# Patient Record
Sex: MALE | Race: WHITE | NOT HISPANIC OR LATINO | Employment: OTHER | URBAN - METROPOLITAN AREA
[De-identification: names, ages, dates, MRNs, and addresses within clinical notes are randomized per-mention and may not be internally consistent; named-entity substitution may affect disease eponyms.]

---

## 2018-05-23 PROBLEM — I63.511 ACUTE ISCHEMIC RIGHT MCA STROKE: Status: ACTIVE | Noted: 2018-05-23

## 2018-05-23 PROBLEM — I63.512 ACUTE ISCHEMIC LEFT MIDDLE CEREBRAL ARTERY (MCA) STROKE: Status: ACTIVE | Noted: 2018-05-23

## 2020-12-01 ENCOUNTER — APPOINTMENT (OUTPATIENT)
Dept: LAB | Facility: HOSPITAL | Age: 65
End: 2020-12-01
Attending: INTERNAL MEDICINE
Payer: MEDICARE

## 2020-12-01 ENCOUNTER — LAB VISIT (OUTPATIENT)
Dept: FAMILY MEDICINE | Facility: CLINIC | Age: 65
End: 2020-12-01
Payer: MEDICARE

## 2020-12-01 DIAGNOSIS — I25.10 CVD (CARDIOVASCULAR DISEASE): ICD-10-CM

## 2020-12-01 DIAGNOSIS — I10 ESSENTIAL HYPERTENSION, MALIGNANT: ICD-10-CM

## 2020-12-01 DIAGNOSIS — D51.9 VITAMIN B12 DEFICIENCY ANEMIA: ICD-10-CM

## 2020-12-01 DIAGNOSIS — I50.9 HEART FAILURE, UNSPECIFIED: ICD-10-CM

## 2020-12-01 DIAGNOSIS — E55.9 VITAMIN D DEFICIENCY DISEASE: ICD-10-CM

## 2020-12-01 DIAGNOSIS — E11.9 DIABETES MELLITUS WITHOUT COMPLICATION: ICD-10-CM

## 2020-12-01 DIAGNOSIS — Z12.5 SCREENING FOR PROSTATE CANCER: ICD-10-CM

## 2020-12-01 DIAGNOSIS — E78.00 PURE HYPERCHOLESTEROLEMIA: Primary | ICD-10-CM

## 2020-12-01 LAB
ALBUMIN SERPL BCP-MCNC: 3.9 G/DL (ref 3.5–5.2)
ALP SERPL-CCNC: 69 U/L (ref 55–135)
ALT SERPL W/O P-5'-P-CCNC: 105 U/L (ref 10–44)
ANION GAP SERPL CALC-SCNC: 9 MMOL/L (ref 8–16)
AST SERPL-CCNC: 81 U/L (ref 10–40)
BASOPHILS # BLD AUTO: 0.07 K/UL (ref 0–0.2)
BASOPHILS NFR BLD: 0.8 % (ref 0–1.9)
BILIRUB SERPL-MCNC: 0.6 MG/DL (ref 0.1–1)
BUN SERPL-MCNC: 11 MG/DL (ref 8–23)
CALCIUM SERPL-MCNC: 8.9 MG/DL (ref 8.7–10.5)
CHLORIDE SERPL-SCNC: 103 MMOL/L (ref 95–110)
CHOLEST SERPL-MCNC: 219 MG/DL (ref 120–199)
CHOLEST/HDLC SERPL: 4.4 {RATIO} (ref 2–5)
CO2 SERPL-SCNC: 25 MMOL/L (ref 23–29)
COMPLEXED PSA SERPL-MCNC: 0.49 NG/ML (ref 0–4)
CREAT SERPL-MCNC: 0.9 MG/DL (ref 0.5–1.4)
DIFFERENTIAL METHOD: ABNORMAL
EOSINOPHIL # BLD AUTO: 0.2 K/UL (ref 0–0.5)
EOSINOPHIL NFR BLD: 2 % (ref 0–8)
ERYTHROCYTE [DISTWIDTH] IN BLOOD BY AUTOMATED COUNT: 12.7 % (ref 11.5–14.5)
EST. GFR  (AFRICAN AMERICAN): >60 ML/MIN/1.73 M^2
EST. GFR  (NON AFRICAN AMERICAN): >60 ML/MIN/1.73 M^2
GLUCOSE SERPL-MCNC: 99 MG/DL (ref 70–110)
HCT VFR BLD AUTO: 49.7 % (ref 40–54)
HDLC SERPL-MCNC: 50 MG/DL (ref 40–75)
HDLC SERPL: 22.8 % (ref 20–50)
HGB BLD-MCNC: 16.6 G/DL (ref 14–18)
IMM GRANULOCYTES # BLD AUTO: 0.03 K/UL (ref 0–0.04)
IMM GRANULOCYTES NFR BLD AUTO: 0.4 % (ref 0–0.5)
LDLC SERPL CALC-MCNC: 148.8 MG/DL (ref 63–159)
LYMPHOCYTES # BLD AUTO: 2.4 K/UL (ref 1–4.8)
LYMPHOCYTES NFR BLD: 28.8 % (ref 18–48)
MAGNESIUM SERPL-MCNC: 2.1 MG/DL (ref 1.6–2.6)
MCH RBC QN AUTO: 32.6 PG (ref 27–31)
MCHC RBC AUTO-ENTMCNC: 33.4 G/DL (ref 32–36)
MCV RBC AUTO: 98 FL (ref 82–98)
MONOCYTES # BLD AUTO: 1 K/UL (ref 0.3–1)
MONOCYTES NFR BLD: 12.1 % (ref 4–15)
NEUTROPHILS # BLD AUTO: 4.7 K/UL (ref 1.8–7.7)
NEUTROPHILS NFR BLD: 55.9 % (ref 38–73)
NONHDLC SERPL-MCNC: 169 MG/DL
NRBC BLD-RTO: 0 /100 WBC
PLATELET # BLD AUTO: 186 K/UL (ref 150–350)
PMV BLD AUTO: 11.7 FL (ref 9.2–12.9)
POTASSIUM SERPL-SCNC: 4.2 MMOL/L (ref 3.5–5.1)
PROT SERPL-MCNC: 8.3 G/DL (ref 6–8.4)
RBC # BLD AUTO: 5.09 M/UL (ref 4.6–6.2)
SODIUM SERPL-SCNC: 137 MMOL/L (ref 136–145)
T4 FREE SERPL-MCNC: 0.71 NG/DL (ref 0.71–1.51)
TRIGL SERPL-MCNC: 101 MG/DL (ref 30–150)
TSH SERPL DL<=0.005 MIU/L-ACNC: 12.97 UIU/ML (ref 0.4–4)
VIT B12 SERPL-MCNC: 362 PG/ML (ref 210–950)
WBC # BLD AUTO: 8.45 K/UL (ref 3.9–12.7)

## 2020-12-01 PROCEDURE — 83735 ASSAY OF MAGNESIUM: CPT

## 2020-12-01 PROCEDURE — 84153 ASSAY OF PSA TOTAL: CPT

## 2020-12-01 PROCEDURE — 84439 ASSAY OF FREE THYROXINE: CPT

## 2020-12-01 PROCEDURE — 82306 VITAMIN D 25 HYDROXY: CPT

## 2020-12-01 PROCEDURE — 80053 COMPREHEN METABOLIC PANEL: CPT

## 2020-12-01 PROCEDURE — 82607 VITAMIN B-12: CPT

## 2020-12-01 PROCEDURE — 84443 ASSAY THYROID STIM HORMONE: CPT

## 2020-12-01 PROCEDURE — 80061 LIPID PANEL: CPT

## 2020-12-01 PROCEDURE — 83036 HEMOGLOBIN GLYCOSYLATED A1C: CPT

## 2020-12-01 PROCEDURE — 85025 COMPLETE CBC W/AUTO DIFF WBC: CPT

## 2020-12-02 LAB
25(OH)D3+25(OH)D2 SERPL-MCNC: 22 NG/ML (ref 30–96)
ESTIMATED AVG GLUCOSE: 108 MG/DL (ref 68–131)
HBA1C MFR BLD HPLC: 5.4 % (ref 4–5.6)

## 2020-12-09 ENCOUNTER — OFFICE VISIT (OUTPATIENT)
Dept: FAMILY MEDICINE | Facility: CLINIC | Age: 65
End: 2020-12-09
Payer: MEDICARE

## 2020-12-09 VITALS
BODY MASS INDEX: 35.21 KG/M2 | OXYGEN SATURATION: 98 % | WEIGHT: 260 LBS | HEIGHT: 72 IN | TEMPERATURE: 98 F | HEART RATE: 64 BPM | SYSTOLIC BLOOD PRESSURE: 138 MMHG | DIASTOLIC BLOOD PRESSURE: 80 MMHG | RESPIRATION RATE: 14 BRPM

## 2020-12-09 DIAGNOSIS — I25.10 CVD (CARDIOVASCULAR DISEASE): Primary | ICD-10-CM

## 2020-12-09 DIAGNOSIS — I10 ESSENTIAL HYPERTENSION, MALIGNANT: ICD-10-CM

## 2020-12-09 DIAGNOSIS — Z95.810 AICD (AUTOMATIC CARDIOVERTER/DEFIBRILLATOR) PRESENT: ICD-10-CM

## 2020-12-09 DIAGNOSIS — I50.22 CHRONIC SYSTOLIC HEART FAILURE: ICD-10-CM

## 2020-12-09 DIAGNOSIS — F41.1 GAD (GENERALIZED ANXIETY DISORDER): ICD-10-CM

## 2020-12-09 DIAGNOSIS — I63.10 CEREBROVASCULAR ACCIDENT (CVA) DUE TO EMBOLISM OF PRECEREBRAL ARTERY: ICD-10-CM

## 2020-12-09 DIAGNOSIS — E78.00 PURE HYPERCHOLESTEROLEMIA: ICD-10-CM

## 2020-12-09 DIAGNOSIS — I48.91 ATRIAL FIBRILLATION WITH CONTROLLED VENTRICULAR RATE: ICD-10-CM

## 2020-12-09 DIAGNOSIS — E03.2 HYPOTHYROIDISM DUE TO MEDICATION: ICD-10-CM

## 2020-12-09 PROCEDURE — 99203 PR OFFICE/OUTPT VISIT, NEW, LEVL III, 30-44 MIN: ICD-10-PCS | Mod: S$GLB,,, | Performed by: INTERNAL MEDICINE

## 2020-12-09 PROCEDURE — 99203 OFFICE O/P NEW LOW 30 MIN: CPT | Mod: S$GLB,,, | Performed by: INTERNAL MEDICINE

## 2020-12-09 RX ORDER — FUROSEMIDE 20 MG/1
40 TABLET ORAL
COMMUNITY
End: 2023-02-21 | Stop reason: DRUGHIGH

## 2020-12-09 RX ORDER — NITROGLYCERIN 0.4 MG/1
0.4 TABLET SUBLINGUAL EVERY 5 MIN PRN
COMMUNITY
End: 2023-04-03 | Stop reason: SDUPTHER

## 2020-12-09 RX ORDER — LEVOTHYROXINE SODIUM 50 UG/1
50 TABLET ORAL
Qty: 30 TABLET | Refills: 11 | Status: SHIPPED | OUTPATIENT
Start: 2020-12-09 | End: 2021-01-20 | Stop reason: DRUGHIGH

## 2020-12-09 RX ORDER — AMIODARONE HYDROCHLORIDE 200 MG/1
200 TABLET ORAL
COMMUNITY
End: 2020-12-09 | Stop reason: SDUPTHER

## 2020-12-09 RX ORDER — LISINOPRIL 20 MG/1
20 TABLET ORAL
COMMUNITY
End: 2020-12-09 | Stop reason: SDUPTHER

## 2020-12-09 RX ORDER — AMIODARONE HYDROCHLORIDE 200 MG/1
200 TABLET ORAL DAILY
Qty: 90 TABLET | Refills: 2 | Status: SHIPPED | OUTPATIENT
Start: 2020-12-09 | End: 2021-09-22

## 2020-12-09 RX ORDER — LISINOPRIL 20 MG/1
20 TABLET ORAL DAILY
Qty: 90 TABLET | Refills: 2 | Status: SHIPPED | OUTPATIENT
Start: 2020-12-09 | End: 2021-09-22

## 2020-12-09 NOTE — PROGRESS NOTES
SUBJECTIVE:    Patient ID: Brandon Rivas is a 65 y.o. male who presents for annual physical.     Patient is here for his yearly wellness examination based on lab work done on December 1, 2020 results of which are available at time of exam of will be reviewed per orally in the presence of the patient.    On the TSH it returned at 12.96 which is significantly elevated on amiodarone therapy which is not unusual and thyroid supplementation will be started on levothyroxine 50 mcg a day with a repeat lab work to be carried out in his 6 week.  With a goal of getting his TSH and free T4 in the normal range.    Amiodarone is to be refilled at 200 mg 1 every day which is the recommendation from his primary cardiologist Dr. Juancarlos Joyce.    With regards to prostate cancer screening the PSA returned normal at 0.49 so no intervention is planned at this time.    On cholesterol profile the fasting cholesterol was slightly elevated at 219 HDL was okay at 50 LDL calculated at 148 which is too high by 80 points and triglycerides okay at 101.  Dietary restrictions have been recommended be size he was on Lipitor 40 mg a day but he has not been taking it lately.  Patient used to take the medication every 2nd to 3rd night with improving his values this will be resumed at this time.      No intervention is planned at this time The glycemic control is perfect with a hemoglobin A1c of 5.4    On his blood count is white blood cell count is normal at 8.45 hemoglobin of 16.6 hematocrit of 49.7 MCV of 98 and platelet count of [id number].  There is no signs of any bone marrow are normally.    On fasting glucose is was normal at 99 BUN and creatinine were 11 and 0.9 respectively with a calculated glomerular filtration rate for non- normal are over 60 cc/minute.  Rest of the electrolytes calcium was normal at 8.9 with an albumin of 3.9 sodium 137 potassium 4.2 chloride 103 bicarb of 25.  All liver function testing that AST was  slightly elevated at 881 and .  Both these values are less than 3 times the upper limits of normal effectively allowing us to continue statin therapy because of the benefits awaiting the wrist.    With regards to his PSA he was doing December of 2021 body was done now so he does not need to be repeated for 2 years.    On last echocardiography from February 27, 2020 his ejection fraction was calculated at 50 2% there was mild dilatation of the left ventricular cavity with mid and distal interventricular septum that was mildly to moderately hypokinetic related to a prior ischemic injury.  The left atrial pressure was normal left atrium is not dilated there was no aortic stenosis there was mild aortic regurgitation      Lab Visit on 12/01/2020   Component Date Value Ref Range Status    Hemoglobin A1C 12/01/2020 5.4  4.0 - 5.6 % Final    Estimated Avg Glucose 12/01/2020 108  68 - 131 mg/dL Final    Magnesium 12/01/2020 2.1  1.6 - 2.6 mg/dL Final    Vit D, 25-Hydroxy 12/01/2020 22* 30 - 96 ng/mL Final    Vitamin B-12 12/01/2020 362  210 - 950 pg/mL Final    PSA, Screen 12/01/2020 0.49  0.00 - 4.00 ng/mL Final    Free T4 12/01/2020 0.71  0.71 - 1.51 ng/dL Final    TSH 12/01/2020 12.966* 0.400 - 4.000 uIU/mL Final    Sodium 12/01/2020 137  136 - 145 mmol/L Final    Potassium 12/01/2020 4.2  3.5 - 5.1 mmol/L Final    Chloride 12/01/2020 103  95 - 110 mmol/L Final    CO2 12/01/2020 25  23 - 29 mmol/L Final    Glucose 12/01/2020 99  70 - 110 mg/dL Final    BUN 12/01/2020 11  8 - 23 mg/dL Final    Creatinine 12/01/2020 0.9  0.5 - 1.4 mg/dL Final    Calcium 12/01/2020 8.9  8.7 - 10.5 mg/dL Final    Total Protein 12/01/2020 8.3  6.0 - 8.4 g/dL Final    Albumin 12/01/2020 3.9  3.5 - 5.2 g/dL Final    Total Bilirubin 12/01/2020 0.6  0.1 - 1.0 mg/dL Final    Alkaline Phosphatase 12/01/2020 69  55 - 135 U/L Final    AST 12/01/2020 81* 10 - 40 U/L Final    ALT 12/01/2020 105* 10 - 44 U/L Final    Anion  Gap 12/01/2020 9  8 - 16 mmol/L Final    eGFR if African American 12/01/2020 >60  >60 mL/min/1.73 m^2 Final    eGFR if non African American 12/01/2020 >60  >60 mL/min/1.73 m^2 Final    WBC 12/01/2020 8.45  3.90 - 12.70 K/uL Final    RBC 12/01/2020 5.09  4.60 - 6.20 M/uL Final    Hemoglobin 12/01/2020 16.6  14.0 - 18.0 g/dL Final    Hematocrit 12/01/2020 49.7  40.0 - 54.0 % Final    MCV 12/01/2020 98  82 - 98 fL Final    MCH 12/01/2020 32.6* 27.0 - 31.0 pg Final    MCHC 12/01/2020 33.4  32.0 - 36.0 g/dL Final    RDW 12/01/2020 12.7  11.5 - 14.5 % Final    Platelets 12/01/2020 186  150 - 350 K/uL Final    MPV 12/01/2020 11.7  9.2 - 12.9 fL Final    Immature Granulocytes 12/01/2020 0.4  0.0 - 0.5 % Final    Gran # (ANC) 12/01/2020 4.7  1.8 - 7.7 K/uL Final    Immature Grans (Abs) 12/01/2020 0.03  0.00 - 0.04 K/uL Final    Lymph # 12/01/2020 2.4  1.0 - 4.8 K/uL Final    Mono # 12/01/2020 1.0  0.3 - 1.0 K/uL Final    Eos # 12/01/2020 0.2  0.0 - 0.5 K/uL Final    Baso # 12/01/2020 0.07  0.00 - 0.20 K/uL Final    nRBC 12/01/2020 0  0 /100 WBC Final    Gran % 12/01/2020 55.9  38.0 - 73.0 % Final    Lymph % 12/01/2020 28.8  18.0 - 48.0 % Final    Mono % 12/01/2020 12.1  4.0 - 15.0 % Final    Eosinophil % 12/01/2020 2.0  0.0 - 8.0 % Final    Basophil % 12/01/2020 0.8  0.0 - 1.9 % Final    Differential Method 12/01/2020 Automated   Final    Cholesterol 12/01/2020 219* 120 - 199 mg/dL Final    Triglycerides 12/01/2020 101  30 - 150 mg/dL Final    HDL 12/01/2020 50  40 - 75 mg/dL Final    LDL Cholesterol 12/01/2020 148.8  63.0 - 159.0 mg/dL Final    HDL/Cholesterol Ratio 12/01/2020 22.8  20.0 - 50.0 % Final    Total Cholesterol/HDL Ratio 12/01/2020 4.4  2.0 - 5.0 Final    Non-HDL Cholesterol 12/01/2020 169  mg/dL Final       Review of patient's allergies indicates:   Allergen Reactions    Statins-hmg-coa reductase inhibitors Other (See Comments)     Statin intolerance. Muscle weakness      Current Outpatient Medications on File Prior to Visit   Medication Sig Dispense Refill    amiodarone (PACERONE) 200 MG Tab Take 200 mg by mouth.      lisinopriL (PRINIVIL,ZESTRIL) 20 MG tablet Take 20 mg by mouth.      METOPROLOL TARTRATE ORAL Take 50 mg by mouth 2 (two) times a day.      MULTIVITAMIN ORAL Take by mouth.      nitroGLYCERIN (NITROSTAT) 0.4 MG SL tablet Place 0.4 mg under the tongue.      aspirin 81 MG Chew Take 1 tablet (81 mg total) by mouth once daily.  0    furosemide (LASIX) 20 MG tablet Take 20 mg by mouth.      [DISCONTINUED] lisinopril (PRINIVIL,ZESTRIL) 40 MG tablet Take 1 tablet (40 mg total) by mouth once daily. 90 tablet 0    [DISCONTINUED] metoprolol succinate (TOPROL-XL) 25 MG 24 hr tablet Take 1 tablet (25 mg total) by mouth once daily. 30 tablet 0    [DISCONTINUED] rosuvastatin (CRESTOR) 10 MG tablet Take 1 tablet (10 mg total) by mouth every evening. 90 tablet 0     No current facility-administered medications on file prior to visit.      Past Medical History:   Diagnosis Date    Coronary artery disease     Hypertension     MI (myocardial infarction)     Thyroid disease      Past Surgical History:   Procedure Laterality Date    CARDIAC PACEMAKER PLACEMENT       Family History   Problem Relation Age of Onset    Fibromyalgia Mother     Heart disease Father     Hyperlipidemia Father      Social History     Tobacco Use    Smoking status: Current Every Day Smoker     Packs/day: 1.00     Years: 40.00     Pack years: 40.00     Types: Cigarettes   Substance Use Topics    Alcohol use: Yes     Comment: 12 pack beer daily    Drug use: No      Health Maintenance Topics with due status: Not Due       Topic Last Completion Date    Lipid Panel 12/01/2020       There is no immunization history on file for this patient.    Review of Systems   All other systems reviewed and are negative.     OBJECTIVE:      Vitals:    12/09/20 1134   BP: 138/80   BP Location: Left arm   Patient  Position: Sitting   BP Method: Medium (Manual)   Pulse: 64   Resp: 14   Temp: 98.1 °F (36.7 °C)   TempSrc: Oral   SpO2: 98%   Weight: 117.9 kg (260 lb)   Height: 6' (1.829 m)     Physical Exam  Vitals signs and nursing note reviewed.   Constitutional:       Appearance: Normal appearance. He is obese.   HENT:      Head: Normocephalic and atraumatic.      Right Ear: Tympanic membrane, ear canal and external ear normal. There is no impacted cerumen.      Left Ear: Tympanic membrane, ear canal and external ear normal. There is no impacted cerumen.      Nose: No congestion or rhinorrhea.      Mouth/Throat:      Mouth: Mucous membranes are moist.      Pharynx: No oropharyngeal exudate or posterior oropharyngeal erythema.   Eyes:      General: No scleral icterus.        Right eye: No discharge.         Left eye: No discharge.      Extraocular Movements: Extraocular movements intact.      Conjunctiva/sclera: Conjunctivae normal.      Pupils: Pupils are equal, round, and reactive to light.   Neck:      Musculoskeletal: Normal range of motion and neck supple. No neck rigidity or muscular tenderness.      Vascular: No carotid bruit.   Cardiovascular:      Rate and Rhythm: Normal rate and regular rhythm.      Pulses: Normal pulses.      Heart sounds: Normal heart sounds. No murmur. No friction rub. No gallop.       Comments: Patient does not have an AICD monitoring equipment at home.  He is not up to follow-up with the cardiologist due to a coronavirus isolation.  He is in a normal sinus rhythm at time of my examination.  No gross murmurs identified at time of auscultation.  There is no leg swelling.  There is no orthopnea now PND   Pulmonary:      Effort: Pulmonary effort is normal. No respiratory distress.      Breath sounds: Normal breath sounds. No stridor. No wheezing, rhonchi or rales.   Chest:      Chest wall: No tenderness.   Abdominal:      General: Abdomen is flat. Bowel sounds are normal. There is no distension.       Palpations: Abdomen is soft. There is no mass.      Tenderness: There is no abdominal tenderness. There is no right CVA tenderness, left CVA tenderness, guarding or rebound.      Hernia: No hernia is present.   Genitourinary:     Penis: Normal.       Scrotum/Testes: Normal.      Prostate: Normal.      Rectum: Normal. Guaiac result negative.   Musculoskeletal: Normal range of motion.         General: No swelling, tenderness, deformity or signs of injury.      Right lower leg: No edema.      Left lower leg: No edema.   Lymphadenopathy:      Cervical: No cervical adenopathy.   Skin:     General: Skin is warm and dry.      Capillary Refill: Capillary refill takes 2 to 3 seconds.      Coloration: Skin is not jaundiced or pale.      Findings: No bruising, erythema, lesion or rash.   Neurological:      General: No focal deficit present.      Mental Status: He is alert and oriented to person, place, and time. Mental status is at baseline.      Cranial Nerves: No cranial nerve deficit.      Sensory: No sensory deficit.      Motor: No weakness.      Coordination: Coordination normal.      Gait: Gait normal.      Deep Tendon Reflexes: Reflexes normal.   Psychiatric:         Mood and Affect: Mood normal.         Behavior: Behavior normal.         Thought Content: Thought content normal.         Judgment: Judgment normal.       Pt declines Vaccines.   There has not been AICD firing by Hx.  He still feels anxious due to his divorce, Covid, etc. Affecting his QOL. Prefers no meds now for same.   Assessment:       1. CVD (cardiovascular disease)    2. Essential hypertension, malignant    3. Pure hypercholesterolemia    4. Chronic systolic heart failure    5. Atrial fibrillation with controlled ventricular rate    6. AICD (automatic cardioverter/defibrillator) present    7. Cerebrovascular accident (CVA) due to embolism of precerebral artery        Plan:       CVD (cardiovascular disease)    Essential hypertension, malignant    Pure  hypercholesterolemia    Chronic systolic heart failure    Atrial fibrillation with controlled ventricular rate    AICD (automatic cardioverter/defibrillator) present    Cerebrovascular accident (CVA) due to embolism of precerebral artery     Plans are for him to be started on levothyroxine 50 mcg every morning starting tomorrow.  This prescription is to be called to [facility] which is his pharmacy of choice.  Same with the amiodarone 200 mg 1 everyday.  Repeat lab work will be carried out at this office on January 19, 2021 for his TSH and free T4 and a follow-up appointment on January 20th.    Counseled on age and gender appropriate medical preventative services, including cancer screenings, immunizations, overall nutritional health, need for a consistent exercise regimen and an overall push towards maintaining a vigorous and active lifestyle.      No follow-ups on file.

## 2021-01-14 DIAGNOSIS — Z12.11 COLON CANCER SCREENING: ICD-10-CM

## 2021-01-19 ENCOUNTER — APPOINTMENT (OUTPATIENT)
Dept: LAB | Facility: HOSPITAL | Age: 66
End: 2021-01-19
Attending: INTERNAL MEDICINE
Payer: MEDICARE

## 2021-01-19 ENCOUNTER — LAB VISIT (OUTPATIENT)
Dept: FAMILY MEDICINE | Facility: CLINIC | Age: 66
End: 2021-01-19
Payer: MEDICARE

## 2021-01-19 DIAGNOSIS — E03.2 HYPOTHYROIDISM DUE TO MEDICATION: ICD-10-CM

## 2021-01-19 LAB
T4 FREE SERPL-MCNC: 0.79 NG/DL (ref 0.71–1.51)
TSH SERPL DL<=0.005 MIU/L-ACNC: 7.04 UIU/ML (ref 0.4–4)

## 2021-01-19 PROCEDURE — 84439 ASSAY OF FREE THYROXINE: CPT

## 2021-01-19 PROCEDURE — 84443 ASSAY THYROID STIM HORMONE: CPT

## 2021-01-20 ENCOUNTER — OFFICE VISIT (OUTPATIENT)
Dept: FAMILY MEDICINE | Facility: CLINIC | Age: 66
End: 2021-01-20
Payer: MEDICARE

## 2021-01-20 VITALS
HEART RATE: 74 BPM | HEIGHT: 72 IN | OXYGEN SATURATION: 95 % | SYSTOLIC BLOOD PRESSURE: 128 MMHG | WEIGHT: 256 LBS | DIASTOLIC BLOOD PRESSURE: 74 MMHG | BODY MASS INDEX: 34.67 KG/M2 | RESPIRATION RATE: 16 BRPM | TEMPERATURE: 99 F

## 2021-01-20 DIAGNOSIS — E78.00 PURE HYPERCHOLESTEROLEMIA: ICD-10-CM

## 2021-01-20 DIAGNOSIS — Z95.810 AICD (AUTOMATIC CARDIOVERTER/DEFIBRILLATOR) PRESENT: ICD-10-CM

## 2021-01-20 DIAGNOSIS — E03.2 HYPOTHYROIDISM DUE TO MEDICATION: Primary | ICD-10-CM

## 2021-01-20 DIAGNOSIS — E11.9 DIABETES MELLITUS WITHOUT COMPLICATION: ICD-10-CM

## 2021-01-20 PROCEDURE — 99213 OFFICE O/P EST LOW 20 MIN: CPT | Mod: S$GLB,,, | Performed by: INTERNAL MEDICINE

## 2021-01-20 PROCEDURE — 99213 PR OFFICE/OUTPT VISIT, EST, LEVL III, 20-29 MIN: ICD-10-PCS | Mod: S$GLB,,, | Performed by: INTERNAL MEDICINE

## 2021-01-20 RX ORDER — LEVOTHYROXINE SODIUM 75 UG/1
75 TABLET ORAL
Qty: 50 TABLET | Refills: 0 | Status: SHIPPED | OUTPATIENT
Start: 2021-01-20 | End: 2021-09-22

## 2021-03-10 ENCOUNTER — PATIENT OUTREACH (OUTPATIENT)
Dept: ADMINISTRATIVE | Facility: HOSPITAL | Age: 66
End: 2021-03-10

## 2021-03-17 ENCOUNTER — OFFICE VISIT (OUTPATIENT)
Dept: FAMILY MEDICINE | Facility: CLINIC | Age: 66
End: 2021-03-17
Payer: MEDICARE

## 2021-03-17 VITALS
HEART RATE: 88 BPM | BODY MASS INDEX: 35.08 KG/M2 | HEIGHT: 72 IN | SYSTOLIC BLOOD PRESSURE: 142 MMHG | WEIGHT: 259 LBS | OXYGEN SATURATION: 94 % | DIASTOLIC BLOOD PRESSURE: 82 MMHG | RESPIRATION RATE: 16 BRPM | TEMPERATURE: 98 F

## 2021-03-17 DIAGNOSIS — F41.1 GAD (GENERALIZED ANXIETY DISORDER): ICD-10-CM

## 2021-03-17 DIAGNOSIS — I25.110 CORONARY ARTERY DISEASE INVOLVING NATIVE CORONARY ARTERY OF NATIVE HEART WITH UNSTABLE ANGINA PECTORIS: ICD-10-CM

## 2021-03-17 DIAGNOSIS — I47.20 VENTRICULAR TACHYCARDIA: ICD-10-CM

## 2021-03-17 DIAGNOSIS — I50.22 CHRONIC SYSTOLIC HEART FAILURE: ICD-10-CM

## 2021-03-17 DIAGNOSIS — I63.10 CEREBROVASCULAR ACCIDENT (CVA) DUE TO EMBOLISM OF PRECEREBRAL ARTERY: ICD-10-CM

## 2021-03-17 DIAGNOSIS — Z95.810 AICD (AUTOMATIC CARDIOVERTER/DEFIBRILLATOR) PRESENT: ICD-10-CM

## 2021-03-17 DIAGNOSIS — E03.2 HYPOTHYROIDISM DUE TO MEDICATION: Primary | ICD-10-CM

## 2021-03-17 PROCEDURE — 99213 OFFICE O/P EST LOW 20 MIN: CPT | Mod: S$GLB,,, | Performed by: INTERNAL MEDICINE

## 2021-03-17 PROCEDURE — 99213 PR OFFICE/OUTPT VISIT, EST, LEVL III, 20-29 MIN: ICD-10-PCS | Mod: S$GLB,,, | Performed by: INTERNAL MEDICINE

## 2021-09-21 ENCOUNTER — LAB VISIT (OUTPATIENT)
Dept: FAMILY MEDICINE | Facility: CLINIC | Age: 66
End: 2021-09-21
Payer: MEDICARE

## 2021-09-21 DIAGNOSIS — E03.2 HYPOTHYROIDISM DUE TO MEDICATION: ICD-10-CM

## 2021-09-21 DIAGNOSIS — Z11.59 ENCOUNTER FOR HEPATITIS C SCREENING TEST FOR LOW RISK PATIENT: ICD-10-CM

## 2021-09-21 LAB
T4 FREE SERPL-MCNC: 0.85 NG/DL (ref 0.71–1.51)
TSH SERPL DL<=0.005 MIU/L-ACNC: 3.44 UIU/ML (ref 0.4–4)

## 2021-09-21 PROCEDURE — 84439 ASSAY OF FREE THYROXINE: CPT | Performed by: INTERNAL MEDICINE

## 2021-09-21 PROCEDURE — 84443 ASSAY THYROID STIM HORMONE: CPT | Performed by: INTERNAL MEDICINE

## 2021-09-21 PROCEDURE — 86803 HEPATITIS C AB TEST: CPT | Performed by: INTERNAL MEDICINE

## 2021-09-22 ENCOUNTER — OFFICE VISIT (OUTPATIENT)
Dept: FAMILY MEDICINE | Facility: CLINIC | Age: 66
End: 2021-09-22
Payer: MEDICARE

## 2021-09-22 VITALS
SYSTOLIC BLOOD PRESSURE: 150 MMHG | BODY MASS INDEX: 35.76 KG/M2 | HEART RATE: 87 BPM | RESPIRATION RATE: 20 BRPM | WEIGHT: 264 LBS | OXYGEN SATURATION: 96 % | TEMPERATURE: 99 F | HEIGHT: 72 IN | DIASTOLIC BLOOD PRESSURE: 88 MMHG

## 2021-09-22 DIAGNOSIS — I73.9 PVD (PERIPHERAL VASCULAR DISEASE): ICD-10-CM

## 2021-09-22 DIAGNOSIS — R26.81 UNSTEADY GAIT WHEN WALKING: ICD-10-CM

## 2021-09-22 DIAGNOSIS — F41.1 GAD (GENERALIZED ANXIETY DISORDER): ICD-10-CM

## 2021-09-22 DIAGNOSIS — I47.20 VENTRICULAR TACHYCARDIA: ICD-10-CM

## 2021-09-22 DIAGNOSIS — Z79.899 ENCOUNTER FOR LONG-TERM (CURRENT) USE OF OTHER MEDICATIONS: ICD-10-CM

## 2021-09-22 DIAGNOSIS — I48.91 ATRIAL FIBRILLATION WITH CONTROLLED VENTRICULAR RATE: ICD-10-CM

## 2021-09-22 DIAGNOSIS — I10 ESSENTIAL HYPERTENSION, MALIGNANT: ICD-10-CM

## 2021-09-22 DIAGNOSIS — I50.22 CHRONIC SYSTOLIC HEART FAILURE: ICD-10-CM

## 2021-09-22 DIAGNOSIS — Z95.810 AICD (AUTOMATIC CARDIOVERTER/DEFIBRILLATOR) PRESENT: ICD-10-CM

## 2021-09-22 DIAGNOSIS — E03.2 HYPOTHYROIDISM DUE TO MEDICATION: Primary | ICD-10-CM

## 2021-09-22 LAB — HCV AB SERPL QL IA: NEGATIVE

## 2021-09-22 PROCEDURE — 99212 PR OFFICE/OUTPT VISIT, EST, LEVL II, 10-19 MIN: ICD-10-PCS | Mod: S$GLB,,, | Performed by: INTERNAL MEDICINE

## 2021-09-22 PROCEDURE — 99212 OFFICE O/P EST SF 10 MIN: CPT | Mod: S$GLB,,, | Performed by: INTERNAL MEDICINE

## 2021-09-22 RX ORDER — AMIODARONE HYDROCHLORIDE 200 MG/1
TABLET ORAL
Qty: 45 TABLET | Refills: 2 | Status: SHIPPED | OUTPATIENT
Start: 2021-09-22 | End: 2022-05-03

## 2021-09-22 RX ORDER — LISINOPRIL 20 MG/1
20 TABLET ORAL DAILY
Qty: 90 TABLET | Refills: 2 | Status: SHIPPED | OUTPATIENT
Start: 2021-09-22 | End: 2022-12-01 | Stop reason: SDUPTHER

## 2022-01-19 DIAGNOSIS — Z12.11 COLON CANCER SCREENING: ICD-10-CM

## 2022-03-21 ENCOUNTER — LAB VISIT (OUTPATIENT)
Dept: FAMILY MEDICINE | Facility: CLINIC | Age: 67
End: 2022-03-21
Payer: MEDICARE

## 2022-03-21 DIAGNOSIS — Z79.899 ENCOUNTER FOR LONG-TERM (CURRENT) USE OF OTHER MEDICATIONS: ICD-10-CM

## 2022-03-21 DIAGNOSIS — E03.2 HYPOTHYROIDISM DUE TO MEDICATION: ICD-10-CM

## 2022-03-21 LAB
ALBUMIN SERPL BCP-MCNC: 3.8 G/DL (ref 3.5–5.2)
ALP SERPL-CCNC: 65 U/L (ref 55–135)
ALT SERPL W/O P-5'-P-CCNC: 34 U/L (ref 10–44)
ANION GAP SERPL CALC-SCNC: 11 MMOL/L (ref 8–16)
AST SERPL-CCNC: 26 U/L (ref 10–40)
BILIRUB SERPL-MCNC: 0.7 MG/DL (ref 0.1–1)
BUN SERPL-MCNC: 8 MG/DL (ref 8–23)
CALCIUM SERPL-MCNC: 8.9 MG/DL (ref 8.7–10.5)
CHLORIDE SERPL-SCNC: 106 MMOL/L (ref 95–110)
CO2 SERPL-SCNC: 22 MMOL/L (ref 23–29)
CREAT SERPL-MCNC: 0.8 MG/DL (ref 0.5–1.4)
EST. GFR  (AFRICAN AMERICAN): >60 ML/MIN/1.73 M^2
EST. GFR  (NON AFRICAN AMERICAN): >60 ML/MIN/1.73 M^2
GLUCOSE SERPL-MCNC: 110 MG/DL (ref 70–110)
POTASSIUM SERPL-SCNC: 4 MMOL/L (ref 3.5–5.1)
PROT SERPL-MCNC: 8.1 G/DL (ref 6–8.4)
SODIUM SERPL-SCNC: 139 MMOL/L (ref 136–145)
T4 FREE SERPL-MCNC: 0.85 NG/DL (ref 0.71–1.51)
TSH SERPL DL<=0.005 MIU/L-ACNC: 3.83 UIU/ML (ref 0.4–4)

## 2022-03-21 PROCEDURE — 84443 ASSAY THYROID STIM HORMONE: CPT | Performed by: INTERNAL MEDICINE

## 2022-03-21 PROCEDURE — 80053 COMPREHEN METABOLIC PANEL: CPT | Performed by: INTERNAL MEDICINE

## 2022-03-21 PROCEDURE — 84439 ASSAY OF FREE THYROXINE: CPT | Performed by: INTERNAL MEDICINE

## 2022-03-24 ENCOUNTER — OFFICE VISIT (OUTPATIENT)
Dept: FAMILY MEDICINE | Facility: CLINIC | Age: 67
End: 2022-03-24
Payer: MEDICARE

## 2022-03-24 VITALS
DIASTOLIC BLOOD PRESSURE: 74 MMHG | HEIGHT: 72 IN | HEART RATE: 84 BPM | SYSTOLIC BLOOD PRESSURE: 136 MMHG | WEIGHT: 269 LBS | TEMPERATURE: 98 F | OXYGEN SATURATION: 97 % | BODY MASS INDEX: 36.44 KG/M2

## 2022-03-24 DIAGNOSIS — I50.22 CHRONIC SYSTOLIC HEART FAILURE: ICD-10-CM

## 2022-03-24 DIAGNOSIS — M79.10 MYALGIA DUE TO STATIN: ICD-10-CM

## 2022-03-24 DIAGNOSIS — I47.20 VENTRICULAR TACHYCARDIA: ICD-10-CM

## 2022-03-24 DIAGNOSIS — Z95.810 AICD (AUTOMATIC CARDIOVERTER/DEFIBRILLATOR) PRESENT: ICD-10-CM

## 2022-03-24 DIAGNOSIS — I25.10 CORONARY ARTERY DISEASE INVOLVING NATIVE CORONARY ARTERY OF NATIVE HEART WITHOUT ANGINA PECTORIS: Primary | ICD-10-CM

## 2022-03-24 DIAGNOSIS — I48.91 ATRIAL FIBRILLATION WITH CONTROLLED VENTRICULAR RATE: ICD-10-CM

## 2022-03-24 DIAGNOSIS — J43.8 OTHER EMPHYSEMA: ICD-10-CM

## 2022-03-24 DIAGNOSIS — E03.2 HYPOTHYROIDISM DUE TO MEDICATION: ICD-10-CM

## 2022-03-24 DIAGNOSIS — E66.01 SEVERE OBESITY (BMI 35.0-39.9) WITH COMORBIDITY: ICD-10-CM

## 2022-03-24 DIAGNOSIS — I73.9 PVD (PERIPHERAL VASCULAR DISEASE): ICD-10-CM

## 2022-03-24 DIAGNOSIS — F33.1 MODERATE EPISODE OF RECURRENT MAJOR DEPRESSIVE DISORDER: ICD-10-CM

## 2022-03-24 DIAGNOSIS — T46.6X5A MYALGIA DUE TO STATIN: ICD-10-CM

## 2022-03-24 PROCEDURE — 99214 OFFICE O/P EST MOD 30 MIN: CPT | Mod: S$GLB,,, | Performed by: INTERNAL MEDICINE

## 2022-03-24 PROCEDURE — 99214 PR OFFICE/OUTPT VISIT, EST, LEVL IV, 30-39 MIN: ICD-10-PCS | Mod: S$GLB,,, | Performed by: INTERNAL MEDICINE

## 2022-03-24 NOTE — PROGRESS NOTES
Subjective:       Patient ID: Brandon Rivas is a 66 y.o. male.    Chief Complaint: Follow-up (Patient is here to discuss labs drawn on 3/21/2022. )    Returns today for follow-up of laboratory evaluation carried out at this facility on March 21, 2022. Report is available at time of exam will be discussed with the patient at length.    Thyroid function testing is unremarkable with a TSH of 3.83 and a free T4 of 0.85.  Patient takes no thyroid supplementation nor is 1 warranted at this time.  This lab work can be repeated in 1 year.    Comprehensive metabolic panel was unremarkable with sodium 139 potassium 4.0 chloride 106 and bicarb of 22. Glucose was normal at 110 with a BUN of a creatinine 0.8 giving him a calculated glomerular filtration rate for non- over 60 cc/minute.  Calcium was normal at 8.9 with an albumin of 3.8.  Liver function testing were also unremarkable with AST and ALT equal to or less than 34 total bilirubin of 0.7 and alkaline phosphatase of 65.    With regards to the care gaps patient is compliant to his aspirin 81 mg every day.  Regarding the abdominal aortic aneurysm screening with ultrasonography patient prefers not to have the ultrasound at this time.  He prefers not to have the low-dose CT for lung screening.  Pertaining the colon cancer screening he has had a fit test before and prefers not to have a colonoscopy.  There is no family history of colon malignancy.    Medication list has been reviewed and he has refills through September of 2022. His current list includes the he takes the Lasix as needed when he has significant edema.  He is compliant to the Zestril 20 mg every day he is not needing the metoprolol lately.  There has been no AICD firing.    Review of Systems   All other systems reviewed and are negative.        Objective:      Physical Exam  Vitals and nursing note reviewed.   Constitutional:       General: He is not in acute distress.     Appearance:  Normal appearance. He is obese. He is not ill-appearing, toxic-appearing or diaphoretic.   HENT:      Head: Normocephalic and atraumatic.   Cardiovascular:      Rate and Rhythm: Normal rate. Rhythm irregular.      Pulses: Normal pulses.      Heart sounds: Murmur heard.     No gallop.   Pulmonary:      Effort: Pulmonary effort is normal. No respiratory distress.      Breath sounds: Normal breath sounds. No wheezing or rhonchi.   Skin:     General: Skin is warm and dry.      Capillary Refill: Capillary refill takes 2 to 3 seconds.      Coloration: Skin is not jaundiced or pale.   Neurological:      Mental Status: He is alert and oriented to person, place, and time. Mental status is at baseline.      Cranial Nerves: No cranial nerve deficit.      Sensory: No sensory deficit.      Motor: No weakness.      Gait: Gait normal.      Deep Tendon Reflexes: Reflexes normal.   Psychiatric:         Mood and Affect: Mood normal.         Behavior: Behavior normal.         Thought Content: Thought content normal.         Judgment: Judgment normal.         Assessment:       Problem List Items Addressed This Visit        Psychiatric    Moderate episode of recurrent major depressive disorder       Pulmonary    Other emphysema       Cardiac/Vascular    PVD (peripheral vascular disease)    Ventricular tachycardia       Endocrine    Severe obesity (BMI 35.0-39.9) with comorbidity      Other Visit Diagnoses     Coronary artery disease involving native coronary artery of native heart without angina pectoris    -  Primary    Chronic systolic heart failure        AICD (automatic cardioverter/defibrillator) present        Hypothyroidism due to medication        Atrial fibrillation with controlled ventricular rate        Myalgia due to statin              Plan:       With regards to patient's medication related hypothyroidism he has completely abated with the decreasing the use of the amiodarone.  This lab work can be repeated in a  year.    Comprehensive metabolic panel is unremarkable and can be repeated in 6 months.    Patient is not in chronic congestive heart failure and is still fairly active without any symptomatology suggestive for angina or significant cardiac dysfunction.    He prefers not to go to the smoking cessation program he says he mainly burns a cigarette he does not really inhaled.    Medication list has been reviewed and been addressed in the HPI.    There has been no episodes of malignant arrhythmia requiring defibrillator discharge.    No significant glycemic derangement has been identified.    Renal function is stable and electrolytes are within normal limits.    Patient could not tolerate statins reason why he is not on 1 right now.  No reason to repeat lipid values of the no going to be treated.  The last 1 was done December of 2020 with an LDL of 148.     Care gaps have been addressed in the HPI.    Patient is to follow-up at the office in 6 months or sooner as needed.  He prefers not to follow-up with Cardiology at this time.    Otherwise patient is well aware of the signs and symptoms to watch for and he is to seek medical attention in case these appear

## 2022-04-18 ENCOUNTER — TELEPHONE (OUTPATIENT)
Dept: FAMILY MEDICINE | Facility: CLINIC | Age: 67
End: 2022-04-18
Payer: MEDICARE

## 2022-04-21 ENCOUNTER — TELEPHONE (OUTPATIENT)
Dept: FAMILY MEDICINE | Facility: CLINIC | Age: 67
End: 2022-04-21
Payer: MEDICARE

## 2022-05-03 ENCOUNTER — OFFICE VISIT (OUTPATIENT)
Dept: FAMILY MEDICINE | Facility: CLINIC | Age: 67
End: 2022-05-03
Payer: MEDICARE

## 2022-05-03 VITALS
HEART RATE: 71 BPM | OXYGEN SATURATION: 95 % | DIASTOLIC BLOOD PRESSURE: 84 MMHG | WEIGHT: 264 LBS | RESPIRATION RATE: 18 BRPM | BODY MASS INDEX: 34.99 KG/M2 | HEIGHT: 73 IN | SYSTOLIC BLOOD PRESSURE: 138 MMHG

## 2022-05-03 DIAGNOSIS — E78.00 PURE HYPERCHOLESTEROLEMIA: ICD-10-CM

## 2022-05-03 DIAGNOSIS — I50.22 CHRONIC SYSTOLIC HEART FAILURE: ICD-10-CM

## 2022-05-03 DIAGNOSIS — E03.2 HYPOTHYROIDISM DUE TO MEDICATION: ICD-10-CM

## 2022-05-03 DIAGNOSIS — Z95.810 AICD (AUTOMATIC CARDIOVERTER/DEFIBRILLATOR) PRESENT: ICD-10-CM

## 2022-05-03 DIAGNOSIS — F41.1 GAD (GENERALIZED ANXIETY DISORDER): ICD-10-CM

## 2022-05-03 DIAGNOSIS — I47.20 VENTRICULAR TACHYCARDIA: Primary | ICD-10-CM

## 2022-05-03 DIAGNOSIS — I63.10 CEREBROVASCULAR ACCIDENT (CVA) DUE TO EMBOLISM OF PRECEREBRAL ARTERY: ICD-10-CM

## 2022-05-03 DIAGNOSIS — I25.10 CORONARY ARTERY DISEASE INVOLVING NATIVE CORONARY ARTERY OF NATIVE HEART WITHOUT ANGINA PECTORIS: ICD-10-CM

## 2022-05-03 PROCEDURE — 99213 PR OFFICE/OUTPT VISIT, EST, LEVL III, 20-29 MIN: ICD-10-PCS | Mod: S$GLB,,, | Performed by: INTERNAL MEDICINE

## 2022-05-03 PROCEDURE — 99213 OFFICE O/P EST LOW 20 MIN: CPT | Mod: S$GLB,,, | Performed by: INTERNAL MEDICINE

## 2022-05-03 RX ORDER — MEXILETINE HYDROCHLORIDE 150 MG/1
CAPSULE ORAL
COMMUNITY
Start: 2022-04-12 | End: 2023-02-22

## 2022-05-03 RX ORDER — METOPROLOL TARTRATE 50 MG/1
50 TABLET ORAL 2 TIMES DAILY
COMMUNITY
Start: 2022-04-12 | End: 2023-02-22

## 2022-05-03 RX ORDER — BUPROPION HYDROCHLORIDE 150 MG/1
150 TABLET ORAL DAILY
Qty: 90 TABLET | Refills: 3 | Status: SHIPPED | OUTPATIENT
Start: 2022-05-03 | End: 2022-11-16 | Stop reason: SDUPTHER
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
## 2022-05-04 ENCOUNTER — TELEPHONE (OUTPATIENT)
Dept: FAMILY MEDICINE | Facility: CLINIC | Age: 67
End: 2022-05-04
Payer: MEDICARE

## 2022-05-04 NOTE — TELEPHONE ENCOUNTER
Late entry for 5/3/2022 at 5:10 PM. Called and spoke to patient to report that this office had not received a call back from the hospitalist at [facility] yet concerning a medication that was prescribed on discharge. Patient verbalized understanding. CHANTEL Wong

## 2022-05-23 ENCOUNTER — TELEPHONE (OUTPATIENT)
Dept: FAMILY MEDICINE | Facility: CLINIC | Age: 67
End: 2022-05-23
Payer: MEDICARE
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
## 2022-09-22 ENCOUNTER — OFFICE VISIT (OUTPATIENT)
Dept: FAMILY MEDICINE | Facility: CLINIC | Age: 67
End: 2022-09-22
Payer: MEDICARE

## 2022-09-22 VITALS
SYSTOLIC BLOOD PRESSURE: 138 MMHG | WEIGHT: 265 LBS | DIASTOLIC BLOOD PRESSURE: 84 MMHG | HEART RATE: 99 BPM | BODY MASS INDEX: 35.12 KG/M2 | RESPIRATION RATE: 20 BRPM | HEIGHT: 73 IN | TEMPERATURE: 98 F | OXYGEN SATURATION: 96 %

## 2022-09-22 DIAGNOSIS — Z79.899 ENCOUNTER FOR LONG-TERM (CURRENT) USE OF OTHER MEDICATIONS: ICD-10-CM

## 2022-09-22 DIAGNOSIS — H69.91 DYSFUNCTION OF RIGHT EUSTACHIAN TUBE: ICD-10-CM

## 2022-09-22 DIAGNOSIS — R79.89 AZOTEMIA: ICD-10-CM

## 2022-09-22 DIAGNOSIS — I47.20 VENTRICULAR TACHYCARDIA: Primary | ICD-10-CM

## 2022-09-22 DIAGNOSIS — I50.22 CHRONIC SYSTOLIC HEART FAILURE: ICD-10-CM

## 2022-09-22 DIAGNOSIS — J00 ACUTE RHINITIS: ICD-10-CM

## 2022-09-22 LAB
ALBUMIN SERPL BCP-MCNC: 3.8 G/DL (ref 3.5–5.2)
ALP SERPL-CCNC: 77 U/L (ref 55–135)
ALT SERPL W/O P-5'-P-CCNC: 21 U/L (ref 10–44)
ANION GAP SERPL CALC-SCNC: 11 MMOL/L (ref 8–16)
AST SERPL-CCNC: 20 U/L (ref 10–40)
BILIRUB SERPL-MCNC: 0.7 MG/DL (ref 0.1–1)
BUN SERPL-MCNC: 9 MG/DL (ref 8–23)
CALCIUM SERPL-MCNC: 8.9 MG/DL (ref 8.7–10.5)
CHLORIDE SERPL-SCNC: 106 MMOL/L (ref 95–110)
CO2 SERPL-SCNC: 21 MMOL/L (ref 23–29)
CREAT SERPL-MCNC: 0.9 MG/DL (ref 0.5–1.4)
EST. GFR  (NO RACE VARIABLE): >60 ML/MIN/1.73 M^2
GLUCOSE SERPL-MCNC: 106 MG/DL (ref 70–110)
POTASSIUM SERPL-SCNC: 4 MMOL/L (ref 3.5–5.1)
PROT SERPL-MCNC: 7.5 G/DL (ref 6–8.4)
SODIUM SERPL-SCNC: 138 MMOL/L (ref 136–145)

## 2022-09-22 PROCEDURE — 99213 PR OFFICE/OUTPT VISIT, EST, LEVL III, 20-29 MIN: ICD-10-PCS | Mod: 25,S$GLB,, | Performed by: INTERNAL MEDICINE

## 2022-09-22 PROCEDURE — 80053 COMPREHEN METABOLIC PANEL: CPT | Performed by: INTERNAL MEDICINE

## 2022-09-22 PROCEDURE — 99213 OFFICE O/P EST LOW 20 MIN: CPT | Mod: 25,S$GLB,, | Performed by: INTERNAL MEDICINE

## 2022-09-22 PROCEDURE — 96372 PR INJECTION,THERAP/PROPH/DIAG2ST, IM OR SUBCUT: ICD-10-PCS | Mod: S$GLB,,, | Performed by: INTERNAL MEDICINE

## 2022-09-22 PROCEDURE — 96372 THER/PROPH/DIAG INJ SC/IM: CPT | Mod: S$GLB,,, | Performed by: INTERNAL MEDICINE

## 2022-09-22 RX ORDER — DEXAMETHASONE SODIUM PHOSPHATE 4 MG/ML
4 INJECTION, SOLUTION INTRA-ARTICULAR; INTRALESIONAL; INTRAMUSCULAR; INTRAVENOUS; SOFT TISSUE
Status: COMPLETED | OUTPATIENT
Start: 2022-09-22 | End: 2022-09-22

## 2022-09-22 RX ADMIN — DEXAMETHASONE SODIUM PHOSPHATE 4 MG: 4 INJECTION, SOLUTION INTRA-ARTICULAR; INTRALESIONAL; INTRAMUSCULAR; INTRAVENOUS; SOFT TISSUE at 10:09

## 2022-09-22 NOTE — PROGRESS NOTES
Subjective:       Patient ID: Brandon Rivas is a 67 y.o. male.    Chief Complaint: Follow-up (Pt presents in clinic for upper respiratory infection symptoms/) and Otalgia (Pt states his right ear hurts)    HPI  Review of Systems      Objective:      Physical Exam    Assessment:       Problem List Items Addressed This Visit          Cardiac/Vascular    Ventricular tachycardia - Primary     Other Visit Diagnoses       Chronic systolic heart failure        Acute rhinitis        Dysfunction of right eustachian tube        Azotemia        Encounter for long-term (current) use of other medications                  Plan:       Test

## 2022-10-24 ENCOUNTER — TELEPHONE (OUTPATIENT)
Dept: FAMILY MEDICINE | Facility: CLINIC | Age: 67
End: 2022-10-24
Payer: MEDICARE

## 2022-11-07 ENCOUNTER — OFFICE VISIT (OUTPATIENT)
Dept: FAMILY MEDICINE | Facility: CLINIC | Age: 67
End: 2022-11-07
Payer: MEDICARE

## 2022-11-07 VITALS
OXYGEN SATURATION: 99 % | WEIGHT: 252.75 LBS | HEIGHT: 73 IN | RESPIRATION RATE: 16 BRPM | DIASTOLIC BLOOD PRESSURE: 72 MMHG | SYSTOLIC BLOOD PRESSURE: 130 MMHG | HEART RATE: 78 BPM | BODY MASS INDEX: 33.5 KG/M2

## 2022-11-07 DIAGNOSIS — J43.8 OTHER EMPHYSEMA: ICD-10-CM

## 2022-11-07 DIAGNOSIS — I47.20 VENTRICULAR TACHYCARDIA: ICD-10-CM

## 2022-11-07 DIAGNOSIS — M79.10 MYALGIA DUE TO STATIN: ICD-10-CM

## 2022-11-07 DIAGNOSIS — K82.8 THICKENING OF WALL OF GALLBLADDER: ICD-10-CM

## 2022-11-07 DIAGNOSIS — E66.01 SEVERE OBESITY (BMI 35.0-39.9) WITH COMORBIDITY: ICD-10-CM

## 2022-11-07 DIAGNOSIS — F17.200 TOBACCO DEPENDENCE: ICD-10-CM

## 2022-11-07 DIAGNOSIS — I25.110 CORONARY ARTERY DISEASE INVOLVING NATIVE CORONARY ARTERY OF NATIVE HEART WITH UNSTABLE ANGINA PECTORIS: ICD-10-CM

## 2022-11-07 DIAGNOSIS — I63.511 ACUTE ISCHEMIC RIGHT MCA STROKE: ICD-10-CM

## 2022-11-07 DIAGNOSIS — I48.91 ATRIAL FIBRILLATION WITH CONTROLLED VENTRICULAR RATE: ICD-10-CM

## 2022-11-07 DIAGNOSIS — R79.89 AZOTEMIA: ICD-10-CM

## 2022-11-07 DIAGNOSIS — K57.90 DIVERTICULOSIS: ICD-10-CM

## 2022-11-07 DIAGNOSIS — I50.22 CHRONIC SYSTOLIC HEART FAILURE: Primary | ICD-10-CM

## 2022-11-07 DIAGNOSIS — T46.6X5A MYALGIA DUE TO STATIN: ICD-10-CM

## 2022-11-07 DIAGNOSIS — Z95.810 AICD (AUTOMATIC CARDIOVERTER/DEFIBRILLATOR) PRESENT: ICD-10-CM

## 2022-11-07 PROCEDURE — 99214 PR OFFICE/OUTPT VISIT, EST, LEVL IV, 30-39 MIN: ICD-10-PCS | Mod: S$GLB,,, | Performed by: INTERNAL MEDICINE

## 2022-11-07 PROCEDURE — 99214 OFFICE O/P EST MOD 30 MIN: CPT | Mod: S$GLB,,, | Performed by: INTERNAL MEDICINE

## 2022-11-07 RX ORDER — ALBUTEROL SULFATE 90 UG/1
2 AEROSOL, METERED RESPIRATORY (INHALATION) EVERY 4 HOURS PRN
Qty: 18 G | Refills: 6 | Status: SHIPPED | OUTPATIENT
Start: 2022-11-07 | End: 2023-02-22

## 2022-11-07 NOTE — Clinical Note
Patient had a CT scan of the abdomen and pelvis at [facility] in the last 2 weeks and no aortic aneurysm was identified at the time.  That should suffice the screening.

## 2022-11-07 NOTE — PROGRESS NOTES
Transitional Care Note  Subjective:       Patient ID: Brandon Rivas is a 67 y.o. male.  Chief Complaint: Hospital Follow Up (Pt presents to the clinic to f/u from hospital (acute inpatient) on 10/26/22 )    Family and/or Caretaker present at visit?  No.  Diagnostic tests reviewed/disposition: No diagnosic tests pending after this hospitalization.  Disease/illness education: No  Home health/community services discussion/referrals: Patient does not have home health established from hospital visit.  They do not need home health.  If needed, we will set up home health for the patient.   Establishment or re-establishment of referral orders for community resources: No other necessary community resources.   Discussion with other health care providers: No discussion with other health care providers necessary.   HPI  Review of Systems    Objective:      Physical Exam    Assessment:       1. Chronic systolic heart failure    2. Azotemia    3. Ventricular tachycardia    4. AICD (automatic cardioverter/defibrillator) present    5. Severe obesity (BMI 35.0-39.9) with comorbidity    6. Atrial fibrillation with controlled ventricular rate    7. Coronary artery disease involving native coronary artery of native heart with unstable angina pectoris    8. Acute ischemic right MCA stroke    9. Myalgia due to statin    10. Tobacco dependence    11. Thickening of wall of gallbladder    12. Diverticulosis        Plan:       Test

## 2022-11-08 ENCOUNTER — PATIENT OUTREACH (OUTPATIENT)
Dept: ADMINISTRATIVE | Facility: HOSPITAL | Age: 67
End: 2022-11-08
Payer: MEDICARE

## 2022-11-08 ENCOUNTER — LAB VISIT (OUTPATIENT)
Dept: FAMILY MEDICINE | Facility: CLINIC | Age: 67
End: 2022-11-08
Payer: MEDICARE

## 2022-11-08 DIAGNOSIS — R79.89 AZOTEMIA: ICD-10-CM

## 2022-11-08 LAB
ANION GAP SERPL CALC-SCNC: 13 MMOL/L (ref 8–16)
BUN SERPL-MCNC: 15 MG/DL (ref 8–23)
CALCIUM SERPL-MCNC: 9.2 MG/DL (ref 8.7–10.5)
CHLORIDE SERPL-SCNC: 103 MMOL/L (ref 95–110)
CO2 SERPL-SCNC: 20 MMOL/L (ref 23–29)
CREAT SERPL-MCNC: 1 MG/DL (ref 0.5–1.4)
EST. GFR  (NO RACE VARIABLE): >60 ML/MIN/1.73 M^2
GLUCOSE SERPL-MCNC: 94 MG/DL (ref 70–110)
POTASSIUM SERPL-SCNC: 3.9 MMOL/L (ref 3.5–5.1)
SODIUM SERPL-SCNC: 136 MMOL/L (ref 136–145)

## 2022-11-08 PROCEDURE — 80048 BASIC METABOLIC PNL TOTAL CA: CPT | Performed by: INTERNAL MEDICINE

## 2022-11-09 ENCOUNTER — TELEPHONE (OUTPATIENT)
Dept: FAMILY MEDICINE | Facility: CLINIC | Age: 67
End: 2022-11-09
Payer: MEDICARE

## 2022-11-16 DIAGNOSIS — F41.1 GAD (GENERALIZED ANXIETY DISORDER): ICD-10-CM

## 2022-11-16 RX ORDER — BUPROPION HYDROCHLORIDE 300 MG/1
TABLET ORAL
Qty: 60 TABLET | Refills: 3 | Status: SHIPPED | OUTPATIENT
Start: 2022-11-16 | End: 2023-02-22

## 2022-11-16 NOTE — TELEPHONE ENCOUNTER
Brandon states he feels the generic wellbutrin is making his stomach hurt and causing him to not sleep. He had the generic 150 mg and was told at last appointment to increase to 2. He is taking one at 4 am and one at 8am. He is requesting the NAME BRAND 300 mg be sent to [facility]. He understands that he will pay for this meds since he does not have a Part B plan and it will be more expensive. He is willing to pay for BRAND.  I called [facility] to verify if they can order brand. The one supplier states brand is $3,00.00 for #30.I have left a message for the patient to call the office so I can explain the cost. Dr Tolbert states he needs to try the 300mg generic, taking one tablet in the AM.     Patient returned call and he agrees with the generic 300 mg #60 trial. Prescription will be sent by Dr Tolbert to [facility] today.

## 2022-12-01 DIAGNOSIS — I10 ESSENTIAL HYPERTENSION, MALIGNANT: ICD-10-CM

## 2022-12-01 RX ORDER — LISINOPRIL 20 MG/1
20 TABLET ORAL DAILY
Qty: 90 TABLET | Refills: 2 | Status: ON HOLD | OUTPATIENT
Start: 2022-12-01 | End: 2023-02-24 | Stop reason: SDUPTHER

## 2022-12-01 RX ORDER — METOPROLOL SUCCINATE 25 MG/1
25 TABLET, EXTENDED RELEASE ORAL DAILY
Qty: 90 TABLET | Refills: 3 | Status: ON HOLD | OUTPATIENT
Start: 2022-12-01 | End: 2023-02-24 | Stop reason: SDUPTHER

## 2022-12-13 ENCOUNTER — TELEPHONE (OUTPATIENT)
Dept: FAMILY MEDICINE | Facility: CLINIC | Age: 67
End: 2022-12-13
Payer: MEDICARE

## 2022-12-13 NOTE — TELEPHONE ENCOUNTER
Patient states the Wellbutrin 300 mg one daily has caused GI distress, muscle pain and no energy. He states he will back down to 150 mg one daily if Dr Tolbert agrees, Dr Tolbert agrees with the change in dosage. Patient is to  contact the office if symptoms persist after the change.  Patient verbalized understanding .   normal...

## 2023-01-10 ENCOUNTER — OFFICE VISIT (OUTPATIENT)
Dept: FAMILY MEDICINE | Facility: CLINIC | Age: 68
End: 2023-01-10
Payer: MEDICARE

## 2023-01-10 VITALS
RESPIRATION RATE: 18 BRPM | HEIGHT: 73 IN | BODY MASS INDEX: 29.69 KG/M2 | OXYGEN SATURATION: 97 % | WEIGHT: 224 LBS | DIASTOLIC BLOOD PRESSURE: 88 MMHG | SYSTOLIC BLOOD PRESSURE: 130 MMHG | HEART RATE: 104 BPM

## 2023-01-10 DIAGNOSIS — R07.2 PRECORDIAL PAIN: Primary | ICD-10-CM

## 2023-01-10 DIAGNOSIS — T46.6X5A MYALGIA DUE TO STATIN: ICD-10-CM

## 2023-01-10 DIAGNOSIS — I50.22 CHRONIC SYSTOLIC HEART FAILURE: ICD-10-CM

## 2023-01-10 DIAGNOSIS — F41.1 GAD (GENERALIZED ANXIETY DISORDER): ICD-10-CM

## 2023-01-10 DIAGNOSIS — F33.1 MODERATE EPISODE OF RECURRENT MAJOR DEPRESSIVE DISORDER: ICD-10-CM

## 2023-01-10 DIAGNOSIS — M79.10 MYALGIA DUE TO STATIN: ICD-10-CM

## 2023-01-10 DIAGNOSIS — Z95.810 AICD (AUTOMATIC CARDIOVERTER/DEFIBRILLATOR) PRESENT: ICD-10-CM

## 2023-01-10 DIAGNOSIS — E87.6 HYPOKALEMIA: ICD-10-CM

## 2023-01-10 DIAGNOSIS — I73.9 PVD (PERIPHERAL VASCULAR DISEASE): ICD-10-CM

## 2023-01-10 PROBLEM — E66.01 SEVERE OBESITY (BMI 35.0-39.9) WITH COMORBIDITY: Status: RESOLVED | Noted: 2022-03-24 | Resolved: 2023-01-10

## 2023-01-10 PROCEDURE — 99213 OFFICE O/P EST LOW 20 MIN: CPT | Mod: S$GLB,,, | Performed by: INTERNAL MEDICINE

## 2023-01-10 PROCEDURE — 99213 PR OFFICE/OUTPT VISIT, EST, LEVL III, 20-29 MIN: ICD-10-PCS | Mod: S$GLB,,, | Performed by: INTERNAL MEDICINE

## 2023-01-10 RX ORDER — POTASSIUM CHLORIDE 750 MG/1
10 TABLET, EXTENDED RELEASE ORAL DAILY
Status: ON HOLD | COMMUNITY
Start: 2023-01-04 | End: 2023-02-24 | Stop reason: SDUPTHER

## 2023-01-10 NOTE — PROGRESS NOTES
Subjective:       Patient ID: Brandon Rivas is a 67 y.o. male.    Chief Complaint: Follow-up (Pt presents to the clinic for hospital f/u on observation [facility] chest pain)    Patient presents for follow-up after an observation admission for chest pain at [facility].  Cardiac enzymes rule out the possibility of a recent myocardial infarction.  He had what appeared to have been a vagal reaction and by history his AICD was interrogated on no significant arrhythmia was identified.  The device has not fired.      He feels better now with significant decrease in his lower extremity edema.  We discussed his lab work including the CBC, magnesium levels, comprehensive metabolic panel.  Potassium of 3.9 is within normal limits but we will like to see more in the 4.5-5.0 range if possible so I recommended for him to modify the diet to try and assist with this.  In the meantime he is not to change the Lasix dose.    Medication list has been reviewed and there is no need for refills.      Patient claims compliance to the Mexitil.      He is not experiencing congestive heart failure like symptomatology.      He is not experiencing angina like symptomatology.      Repeat basic metabolic panel is scheduled at this facility on February 28, 2023 with a follow-up appointment.      Vital signs are stable with a blood pressure 130/88 and a pulse of 90-92 at the time of my examination.  Pulse oximetry is unremarkable.      No care gaps were addressed during this visit.    There is definitely no orthopnea or paroxysmal nocturnal dyspnea.  Patient is not experiencing orthostasis either.  He is 2+ to be on Lasix 20 mg tablet 2 tablets by mouth twice daily.  Lisinopril stayed the same at 20 mg a day.  No changes on the metoprolol succinate at 25 mg 1 every night.  Mexitil dose was 150 mg 1 tablet every day.    Patient requested never been back on bupropion because of the way that he fell while he was on it and he says that  it caused severe constipation and overall made him feel worse.  It has been included as an allergy to avoid using same.    Review of Systems   All other systems reviewed and are negative.      Objective:      Physical Exam  Vitals and nursing note reviewed.   Constitutional:       General: He is not in acute distress.     Appearance: Normal appearance. He is normal weight. He is not ill-appearing, toxic-appearing or diaphoretic.   HENT:      Head: Normocephalic and atraumatic.   Cardiovascular:      Rate and Rhythm: Normal rate and regular rhythm.      Pulses: Normal pulses.   Pulmonary:      Effort: Pulmonary effort is normal.   Musculoskeletal:      Right lower leg: No edema.      Left lower leg: No edema.   Skin:     General: Skin is warm and dry.      Coloration: Skin is not jaundiced or pale.   Neurological:      Mental Status: He is alert. Mental status is at baseline.      Cranial Nerves: No cranial nerve deficit.      Sensory: No sensory deficit.      Motor: No weakness.      Coordination: Coordination normal.      Gait: Gait normal.   Psychiatric:         Mood and Affect: Mood normal.         Behavior: Behavior normal.         Thought Content: Thought content normal.         Judgment: Judgment normal.       Assessment:       Problem List Items Addressed This Visit    None  Visit Diagnoses       Precordial pain    -  Primary    Hypokalemia        AICD (automatic cardioverter/defibrillator) present        Chronic systolic heart failure        Myalgia due to statin        DERECK (generalized anxiety disorder)                  Plan:       Patient presented to the emergency room with complaints of chest pain myocardial infarction was ruled out.      Medications were no change with the hospital stay.      Medication list has been reviewed with the patient and he has been instructed to continue same.      Plans are for the basic metabolic panel to be repeated at this facility February 28, 2023 to reassess electrolyte  values and hydration status.    There has been no AICD firing.      Patient is not on statin because of myalgias secondary to same.      He has discontinued the Wellbutrin on his own because of intolerance.    Pt is well aware of the signs and symptoms to watch for and to seek medical attention in case these appear

## 2023-01-12 ENCOUNTER — TELEPHONE (OUTPATIENT)
Dept: FAMILY MEDICINE | Facility: CLINIC | Age: 68
End: 2023-01-12
Payer: MEDICARE

## 2023-01-12 NOTE — TELEPHONE ENCOUNTER
Patient called to report he was in ER this morning due to his stomach being stopped. He states the lab results showed him was low on Potassium and that is why his stomach stopped on him - backed up - constipation per Brandon. He is doubling his potassium per the ER physician he said and wants Dr Tolbert to be aware of this change in medication. He has labs scheduled to be drawn here 02/28/23.

## 2023-01-17 ENCOUNTER — TELEPHONE (OUTPATIENT)
Dept: GASTROENTEROLOGY | Facility: CLINIC | Age: 68
End: 2023-01-17
Payer: MEDICARE

## 2023-01-17 ENCOUNTER — TELEPHONE (OUTPATIENT)
Dept: FAMILY MEDICINE | Facility: CLINIC | Age: 68
End: 2023-01-17
Payer: MEDICARE

## 2023-01-17 ENCOUNTER — DOCUMENTATION ONLY (OUTPATIENT)
Dept: FAMILY MEDICINE | Facility: CLINIC | Age: 68
End: 2023-01-17
Payer: MEDICARE

## 2023-01-17 DIAGNOSIS — R10.13 EPIGASTRIC PAIN: Primary | ICD-10-CM

## 2023-01-17 NOTE — PROGRESS NOTES
The consult for Dr Ott was marked URGENT per Dr Tolbert's instructions. Recent lab results and xray results were also sent to Dr Ott's clinic.

## 2023-01-17 NOTE — TELEPHONE ENCOUNTER
Brandon was in the ER again over the weekend. He states they told him he needed to see Dr Claudette ST[address] due to the severe and ongoing stomach pain for EGD. Brandon request Dr Tolbert send for urgent referral for this week if possible.

## 2023-01-17 NOTE — TELEPHONE ENCOUNTER
Call placed to Mr. Rivas in regards to GI referral received. He stated that right now he does not have transportation from [place] to [place]. He stated he will call us back if he is able to get a way to our office. No further issues noted.

## 2023-01-17 NOTE — TELEPHONE ENCOUNTER
Call placed to Mr. Leonardo in regards to message received. Appt made for 1/20 at 0830. He accepted. No further issues noted.

## 2023-01-17 NOTE — TELEPHONE ENCOUNTER
----- Message from Sriram Frazier, Patient Care Assistant sent at 1/17/2023  2:33 PM CST -----  Contact: Pt  Type: Return Call    Who Called: Pt  Who Left Message for Pt: Mona  Does the patient know what this is regarding: Yes/Scheduling  Best Call Back Number: [phone]  Thank you~

## 2023-01-23 ENCOUNTER — TELEPHONE (OUTPATIENT)
Dept: FAMILY MEDICINE | Facility: CLINIC | Age: 68
End: 2023-01-23
Payer: MEDICARE

## 2023-01-23 NOTE — TELEPHONE ENCOUNTER
Returned phone to to Jerzy Rivas, brother of Brandon. He was asking about [facility] and what it takes for a person to enter on of those type facilities. I explained that the patient would have to agree, go interview at the facility, bring intake form at an office visit and will need CXR and TB skin testing clearance. Explained that these facilities are [facility] pay only. Jerzy verbalized understanding of this general information.

## 2023-01-31 ENCOUNTER — LAB VISIT (OUTPATIENT)
Dept: FAMILY MEDICINE | Facility: CLINIC | Age: 68
End: 2023-01-31
Payer: MEDICARE

## 2023-01-31 DIAGNOSIS — E83.42 HYPOMAGNESEMIA: ICD-10-CM

## 2023-01-31 DIAGNOSIS — E87.6 HYPOKALEMIA: ICD-10-CM

## 2023-01-31 DIAGNOSIS — E87.6 HYPOKALEMIA: Primary | ICD-10-CM

## 2023-01-31 LAB
ANION GAP SERPL CALC-SCNC: 11 MMOL/L (ref 8–16)
BUN SERPL-MCNC: 8 MG/DL (ref 8–23)
CALCIUM SERPL-MCNC: 9.8 MG/DL (ref 8.7–10.5)
CHLORIDE SERPL-SCNC: 104 MMOL/L (ref 95–110)
CO2 SERPL-SCNC: 23 MMOL/L (ref 23–29)
CREAT SERPL-MCNC: 0.7 MG/DL (ref 0.5–1.4)
EST. GFR  (NO RACE VARIABLE): >60 ML/MIN/1.73 M^2
GLUCOSE SERPL-MCNC: 97 MG/DL (ref 70–110)
MAGNESIUM SERPL-MCNC: 2.1 MG/DL (ref 1.6–2.6)
POTASSIUM SERPL-SCNC: 4.3 MMOL/L (ref 3.5–5.1)
SODIUM SERPL-SCNC: 138 MMOL/L (ref 136–145)

## 2023-01-31 PROCEDURE — 80048 BASIC METABOLIC PNL TOTAL CA: CPT | Performed by: INTERNAL MEDICINE

## 2023-01-31 PROCEDURE — 83735 ASSAY OF MAGNESIUM: CPT | Performed by: INTERNAL MEDICINE

## 2023-02-02 ENCOUNTER — TELEPHONE (OUTPATIENT)
Dept: FAMILY MEDICINE | Facility: CLINIC | Age: 68
End: 2023-02-02
Payer: MEDICARE

## 2023-02-02 NOTE — TELEPHONE ENCOUNTER
Patient called for labs results. Per Tomasz: The Mag and BMP values are all within normal limits. He is to continue medications with no changes. Patient complains of gas and constipation. Patient informed that these complaints were normal for his age but he can use GasX, Gavison Tabs OTC, stool softeners and Ducolax tabs -(twice a week only). Patient instructed to drink water and watch diet to assist with constipation. He is not to take Pedialite of any drink that contains sodium or potassium. Patient verbalized understanding.

## 2023-02-08 DIAGNOSIS — K57.90 DIVERTICULOSIS: Primary | ICD-10-CM

## 2023-02-09 ENCOUNTER — TELEPHONE (OUTPATIENT)
Dept: FAMILY MEDICINE | Facility: CLINIC | Age: 68
End: 2023-02-09
Payer: MEDICARE

## 2023-02-09 NOTE — TELEPHONE ENCOUNTER
I called patient and left message for him to call Gastro/dr Ott at [phone]. They are trying to contact him to schedule an appointment per Dr Tolbert's request. URGENT that he call them back today.

## 2023-02-16 ENCOUNTER — TELEPHONE (OUTPATIENT)
Dept: FAMILY MEDICINE | Facility: CLINIC | Age: 68
End: 2023-02-16
Payer: MEDICARE

## 2023-02-16 DIAGNOSIS — L02.215 PERINEAL ABSCESS: Primary | ICD-10-CM

## 2023-02-16 RX ORDER — CEPHALEXIN 500 MG/1
500 CAPSULE ORAL 4 TIMES DAILY
Qty: 20 CAPSULE | Refills: 0 | Status: ON HOLD | OUTPATIENT
Start: 2023-02-16 | End: 2023-02-24 | Stop reason: HOSPADM

## 2023-02-16 NOTE — TELEPHONE ENCOUNTER
Returned called to patient. Instructed patient that Dr. Tolbert has sent a prescription for Keflex 500 mg PO four times a day to [facility] for the abscess to his perineum. Patient verbalized understanding. Instructed patient per Dr. Tolbert to keep area clean and dry. Patient verbalized understanding. CHATNEL Wong.

## 2023-02-21 ENCOUNTER — HOSPITAL ENCOUNTER (INPATIENT)
Facility: HOSPITAL | Age: 68
LOS: 2 days | Discharge: HOME HEALTH | DRG: 308 | End: 2023-02-24
Attending: EMERGENCY MEDICINE | Admitting: INTERNAL MEDICINE
Payer: MEDICARE

## 2023-02-21 DIAGNOSIS — R07.9 CHEST PAIN, UNSPECIFIED TYPE: ICD-10-CM

## 2023-02-21 DIAGNOSIS — I50.9 CHF (CONGESTIVE HEART FAILURE): ICD-10-CM

## 2023-02-21 DIAGNOSIS — I10 ESSENTIAL HYPERTENSION, MALIGNANT: ICD-10-CM

## 2023-02-21 DIAGNOSIS — I50.41 ACUTE COMBINED SYSTOLIC AND DIASTOLIC HEART FAILURE: Primary | ICD-10-CM

## 2023-02-21 DIAGNOSIS — Z45.02 DEFIBRILLATOR DISCHARGE: ICD-10-CM

## 2023-02-21 DIAGNOSIS — I47.10 SVT (SUPRAVENTRICULAR TACHYCARDIA): ICD-10-CM

## 2023-02-21 DIAGNOSIS — R00.2 PALPITATIONS: ICD-10-CM

## 2023-02-21 DIAGNOSIS — R07.9 CHEST PAIN: ICD-10-CM

## 2023-02-21 DIAGNOSIS — R06.02 SOB (SHORTNESS OF BREATH): ICD-10-CM

## 2023-02-21 LAB
ALBUMIN SERPL BCP-MCNC: 3.7 G/DL (ref 3.5–5.2)
ALP SERPL-CCNC: 78 U/L (ref 55–135)
ALT SERPL W/O P-5'-P-CCNC: 22 U/L (ref 10–44)
ANION GAP SERPL CALC-SCNC: 11 MMOL/L (ref 8–16)
AST SERPL-CCNC: 24 U/L (ref 10–40)
BASOPHILS # BLD AUTO: 0.07 K/UL (ref 0–0.2)
BASOPHILS NFR BLD: 0.8 % (ref 0–1.9)
BILIRUB SERPL-MCNC: 0.4 MG/DL (ref 0.1–1)
BNP SERPL-MCNC: 1001 PG/ML (ref 0–99)
BUN SERPL-MCNC: 10 MG/DL (ref 8–23)
CALCIUM SERPL-MCNC: 9.1 MG/DL (ref 8.7–10.5)
CHLORIDE SERPL-SCNC: 100 MMOL/L (ref 95–110)
CO2 SERPL-SCNC: 23 MMOL/L (ref 23–29)
CREAT SERPL-MCNC: 0.7 MG/DL (ref 0.5–1.4)
DIFFERENTIAL METHOD: ABNORMAL
EOSINOPHIL # BLD AUTO: 0.3 K/UL (ref 0–0.5)
EOSINOPHIL NFR BLD: 2.9 % (ref 0–8)
ERYTHROCYTE [DISTWIDTH] IN BLOOD BY AUTOMATED COUNT: 14.7 % (ref 11.5–14.5)
EST. GFR  (NO RACE VARIABLE): >60 ML/MIN/1.73 M^2
GLUCOSE SERPL-MCNC: 94 MG/DL (ref 70–110)
HCT VFR BLD AUTO: 37.3 % (ref 40–54)
HGB BLD-MCNC: 12.1 G/DL (ref 14–18)
IMM GRANULOCYTES # BLD AUTO: 0.03 K/UL (ref 0–0.04)
IMM GRANULOCYTES NFR BLD AUTO: 0.3 % (ref 0–0.5)
INR PPP: 1 (ref 0.8–1.2)
LYMPHOCYTES # BLD AUTO: 1.5 K/UL (ref 1–4.8)
LYMPHOCYTES NFR BLD: 16.6 % (ref 18–48)
MCH RBC QN AUTO: 29.6 PG (ref 27–31)
MCHC RBC AUTO-ENTMCNC: 32.4 G/DL (ref 32–36)
MCV RBC AUTO: 91 FL (ref 82–98)
MONOCYTES # BLD AUTO: 0.9 K/UL (ref 0.3–1)
MONOCYTES NFR BLD: 9.4 % (ref 4–15)
NEUTROPHILS # BLD AUTO: 6.3 K/UL (ref 1.8–7.7)
NEUTROPHILS NFR BLD: 70 % (ref 38–73)
NRBC BLD-RTO: 0 /100 WBC
PLATELET # BLD AUTO: 276 K/UL (ref 150–450)
PMV BLD AUTO: 10.9 FL (ref 9.2–12.9)
POTASSIUM SERPL-SCNC: 3.6 MMOL/L (ref 3.5–5.1)
PROT SERPL-MCNC: 7.6 G/DL (ref 6–8.4)
PROTHROMBIN TIME: 11.3 SEC (ref 9–12.5)
RBC # BLD AUTO: 4.09 M/UL (ref 4.6–6.2)
SODIUM SERPL-SCNC: 134 MMOL/L (ref 136–145)
TROPONIN I SERPL HS-MCNC: 23.1 PG/ML (ref 0–14.9)
WBC # BLD AUTO: 9.05 K/UL (ref 3.9–12.7)

## 2023-02-21 PROCEDURE — 83880 ASSAY OF NATRIURETIC PEPTIDE: CPT | Performed by: EMERGENCY MEDICINE

## 2023-02-21 PROCEDURE — 96375 TX/PRO/DX INJ NEW DRUG ADDON: CPT

## 2023-02-21 PROCEDURE — 84484 ASSAY OF TROPONIN QUANT: CPT | Performed by: EMERGENCY MEDICINE

## 2023-02-21 PROCEDURE — 85610 PROTHROMBIN TIME: CPT | Performed by: EMERGENCY MEDICINE

## 2023-02-21 PROCEDURE — 80053 COMPREHEN METABOLIC PANEL: CPT | Performed by: EMERGENCY MEDICINE

## 2023-02-21 PROCEDURE — 85025 COMPLETE CBC W/AUTO DIFF WBC: CPT | Performed by: EMERGENCY MEDICINE

## 2023-02-21 PROCEDURE — 93005 ELECTROCARDIOGRAM TRACING: CPT | Performed by: SPECIALIST

## 2023-02-21 PROCEDURE — 93010 ELECTROCARDIOGRAM REPORT: CPT | Mod: ,,, | Performed by: SPECIALIST

## 2023-02-21 PROCEDURE — 93010 EKG 12-LEAD: ICD-10-PCS | Mod: ,,, | Performed by: SPECIALIST

## 2023-02-21 PROCEDURE — 99285 EMERGENCY DEPT VISIT HI MDM: CPT | Mod: 25

## 2023-02-21 RX ORDER — PANTOPRAZOLE SODIUM 40 MG/1
40 TABLET, DELAYED RELEASE ORAL DAILY
COMMUNITY
Start: 2023-01-20 | End: 2023-05-09 | Stop reason: SDUPTHER

## 2023-02-21 RX ORDER — FUROSEMIDE 40 MG/1
40 TABLET ORAL DAILY
Status: ON HOLD | COMMUNITY
Start: 2023-01-04 | End: 2023-02-24 | Stop reason: SDUPTHER

## 2023-02-21 RX ORDER — HYDROXYZINE HYDROCHLORIDE 25 MG/1
25 TABLET, FILM COATED ORAL 3 TIMES DAILY PRN
Status: ON HOLD | COMMUNITY
Start: 2023-01-15 | End: 2023-02-24 | Stop reason: HOSPADM

## 2023-02-21 RX ORDER — TAMSULOSIN HYDROCHLORIDE 0.4 MG/1
1 CAPSULE ORAL DAILY
Status: ON HOLD | COMMUNITY
Start: 2023-01-15 | End: 2023-04-25 | Stop reason: HOSPADM

## 2023-02-21 NOTE — Clinical Note
Diagnosis: SVT (supraventricular tachycardia) [202906]   Future Attending Provider: IKE KOENIG [[id number]]   Admitting Provider:: IKE KOENIG [[id number]]

## 2023-02-22 PROBLEM — T82.198A INAPPROPRIATE SHOCKS FROM ICD (IMPLANTABLE CARDIOVERTER-DEFIBRILLATOR): Status: ACTIVE | Noted: 2023-02-22

## 2023-02-22 LAB
ANION GAP SERPL CALC-SCNC: 7 MMOL/L (ref 8–16)
BUN SERPL-MCNC: 8 MG/DL (ref 8–23)
CALCIUM SERPL-MCNC: 8.8 MG/DL (ref 8.7–10.5)
CHLORIDE SERPL-SCNC: 105 MMOL/L (ref 95–110)
CO2 SERPL-SCNC: 24 MMOL/L (ref 23–29)
CREAT SERPL-MCNC: 0.7 MG/DL (ref 0.5–1.4)
ERYTHROCYTE [DISTWIDTH] IN BLOOD BY AUTOMATED COUNT: 14.8 % (ref 11.5–14.5)
EST. GFR  (NO RACE VARIABLE): >60 ML/MIN/1.73 M^2
GLUCOSE SERPL-MCNC: 97 MG/DL (ref 70–110)
HCT VFR BLD AUTO: 37.2 % (ref 40–54)
HGB BLD-MCNC: 12.1 G/DL (ref 14–18)
MAGNESIUM SERPL-MCNC: 2.3 MG/DL (ref 1.6–2.6)
MCH RBC QN AUTO: 30 PG (ref 27–31)
MCHC RBC AUTO-ENTMCNC: 32.5 G/DL (ref 32–36)
MCV RBC AUTO: 92 FL (ref 82–98)
PLATELET # BLD AUTO: 316 K/UL (ref 150–450)
PMV BLD AUTO: 11.3 FL (ref 9.2–12.9)
POTASSIUM SERPL-SCNC: 3.6 MMOL/L (ref 3.5–5.1)
RBC # BLD AUTO: 4.03 M/UL (ref 4.6–6.2)
SODIUM SERPL-SCNC: 136 MMOL/L (ref 136–145)
TROPONIN I SERPL HS-MCNC: 18.4 PG/ML (ref 0–14.9)
TROPONIN I SERPL HS-MCNC: 22.5 PG/ML (ref 0–14.9)
TROPONIN I SERPL HS-MCNC: 24.9 PG/ML (ref 0–14.9)
WBC # BLD AUTO: 8.08 K/UL (ref 3.9–12.7)

## 2023-02-22 PROCEDURE — 96375 TX/PRO/DX INJ NEW DRUG ADDON: CPT

## 2023-02-22 PROCEDURE — 63600175 PHARM REV CODE 636 W HCPCS: Performed by: INTERNAL MEDICINE

## 2023-02-22 PROCEDURE — 25000003 PHARM REV CODE 250: Performed by: INTERNAL MEDICINE

## 2023-02-22 PROCEDURE — 80048 BASIC METABOLIC PNL TOTAL CA: CPT | Performed by: INTERNAL MEDICINE

## 2023-02-22 PROCEDURE — 25000003 PHARM REV CODE 250: Performed by: SPECIALIST

## 2023-02-22 PROCEDURE — 63600175 PHARM REV CODE 636 W HCPCS: Performed by: STUDENT IN AN ORGANIZED HEALTH CARE EDUCATION/TRAINING PROGRAM

## 2023-02-22 PROCEDURE — 96366 THER/PROPH/DIAG IV INF ADDON: CPT

## 2023-02-22 PROCEDURE — 93289 INTERROG DEVICE EVAL HEART: CPT | Mod: 26,,, | Performed by: SPECIALIST

## 2023-02-22 PROCEDURE — 36415 COLL VENOUS BLD VENIPUNCTURE: CPT | Performed by: INTERNAL MEDICINE

## 2023-02-22 PROCEDURE — 96365 THER/PROPH/DIAG IV INF INIT: CPT

## 2023-02-22 PROCEDURE — 21400001 HC TELEMETRY ROOM

## 2023-02-22 PROCEDURE — 99223 1ST HOSP IP/OBS HIGH 75: CPT | Mod: 25,,, | Performed by: SPECIALIST

## 2023-02-22 PROCEDURE — 63600175 PHARM REV CODE 636 W HCPCS: Mod: TB,JG | Performed by: SPECIALIST

## 2023-02-22 PROCEDURE — 84484 ASSAY OF TROPONIN QUANT: CPT | Performed by: EMERGENCY MEDICINE

## 2023-02-22 PROCEDURE — 85027 COMPLETE CBC AUTOMATED: CPT | Performed by: INTERNAL MEDICINE

## 2023-02-22 PROCEDURE — C9113 INJ PANTOPRAZOLE SODIUM, VIA: HCPCS | Performed by: INTERNAL MEDICINE

## 2023-02-22 PROCEDURE — 63600175 PHARM REV CODE 636 W HCPCS: Performed by: EMERGENCY MEDICINE

## 2023-02-22 PROCEDURE — 84484 ASSAY OF TROPONIN QUANT: CPT | Mod: 91 | Performed by: INTERNAL MEDICINE

## 2023-02-22 PROCEDURE — 99223 PR INITIAL HOSPITAL CARE,LEVL III: ICD-10-PCS | Mod: 25,,, | Performed by: SPECIALIST

## 2023-02-22 PROCEDURE — 93289 PR INTERROG EVAL, IN PERSON,CARDVERT/DEFIB: ICD-10-PCS | Mod: 26,,, | Performed by: SPECIALIST

## 2023-02-22 PROCEDURE — 83735 ASSAY OF MAGNESIUM: CPT | Performed by: INTERNAL MEDICINE

## 2023-02-22 PROCEDURE — 96376 TX/PRO/DX INJ SAME DRUG ADON: CPT

## 2023-02-22 RX ORDER — FUROSEMIDE 10 MG/ML
40 INJECTION INTRAMUSCULAR; INTRAVENOUS
Status: COMPLETED | OUTPATIENT
Start: 2023-02-22 | End: 2023-02-22

## 2023-02-22 RX ORDER — NITROGLYCERIN 0.4 MG/1
0.4 TABLET SUBLINGUAL EVERY 5 MIN PRN
Status: DISCONTINUED | OUTPATIENT
Start: 2023-02-22 | End: 2023-02-24 | Stop reason: HOSPADM

## 2023-02-22 RX ORDER — CALCIUM CARBONATE 200(500)MG
1000 TABLET,CHEWABLE ORAL 2 TIMES DAILY PRN
Status: DISCONTINUED | OUTPATIENT
Start: 2023-02-22 | End: 2023-02-24 | Stop reason: HOSPADM

## 2023-02-22 RX ORDER — METOPROLOL SUCCINATE 50 MG/1
50 TABLET, EXTENDED RELEASE ORAL DAILY
Status: DISCONTINUED | OUTPATIENT
Start: 2023-02-22 | End: 2023-02-24 | Stop reason: HOSPADM

## 2023-02-22 RX ORDER — LORAZEPAM 0.5 MG/1
0.5 TABLET ORAL EVERY 8 HOURS PRN
Status: DISCONTINUED | OUTPATIENT
Start: 2023-02-22 | End: 2023-02-24 | Stop reason: HOSPADM

## 2023-02-22 RX ORDER — GLUCAGON 1 MG
1 KIT INJECTION
Status: DISCONTINUED | OUTPATIENT
Start: 2023-02-22 | End: 2023-02-24 | Stop reason: HOSPADM

## 2023-02-22 RX ORDER — SPIRONOLACTONE 25 MG/1
25 TABLET ORAL DAILY
Status: DISCONTINUED | OUTPATIENT
Start: 2023-02-22 | End: 2023-02-24 | Stop reason: HOSPADM

## 2023-02-22 RX ORDER — NALOXONE HCL 0.4 MG/ML
0.02 VIAL (ML) INJECTION
Status: DISCONTINUED | OUTPATIENT
Start: 2023-02-22 | End: 2023-02-24 | Stop reason: HOSPADM

## 2023-02-22 RX ORDER — IBUPROFEN 200 MG
16 TABLET ORAL
Status: DISCONTINUED | OUTPATIENT
Start: 2023-02-22 | End: 2023-02-24 | Stop reason: HOSPADM

## 2023-02-22 RX ORDER — NAPROXEN SODIUM 220 MG/1
81 TABLET, FILM COATED ORAL DAILY
Status: DISCONTINUED | OUTPATIENT
Start: 2023-02-22 | End: 2023-02-24 | Stop reason: HOSPADM

## 2023-02-22 RX ORDER — IBUPROFEN 200 MG
24 TABLET ORAL
Status: DISCONTINUED | OUTPATIENT
Start: 2023-02-22 | End: 2023-02-24 | Stop reason: HOSPADM

## 2023-02-22 RX ORDER — LORAZEPAM 2 MG/ML
0.25 INJECTION INTRAMUSCULAR
Status: COMPLETED | OUTPATIENT
Start: 2023-02-22 | End: 2023-02-22

## 2023-02-22 RX ORDER — FUROSEMIDE 10 MG/ML
40 INJECTION INTRAMUSCULAR; INTRAVENOUS
Status: DISCONTINUED | OUTPATIENT
Start: 2023-02-22 | End: 2023-02-23

## 2023-02-22 RX ORDER — PANTOPRAZOLE SODIUM 40 MG/10ML
40 INJECTION, POWDER, LYOPHILIZED, FOR SOLUTION INTRAVENOUS DAILY
Status: DISCONTINUED | OUTPATIENT
Start: 2023-02-22 | End: 2023-02-24 | Stop reason: HOSPADM

## 2023-02-22 RX ORDER — LISINOPRIL 20 MG/1
20 TABLET ORAL DAILY
Status: DISCONTINUED | OUTPATIENT
Start: 2023-02-22 | End: 2023-02-24 | Stop reason: HOSPADM

## 2023-02-22 RX ORDER — TAMSULOSIN HYDROCHLORIDE 0.4 MG/1
1 CAPSULE ORAL DAILY
Status: DISCONTINUED | OUTPATIENT
Start: 2023-02-22 | End: 2023-02-24 | Stop reason: HOSPADM

## 2023-02-22 RX ORDER — ENOXAPARIN SODIUM 100 MG/ML
40 INJECTION SUBCUTANEOUS EVERY 24 HOURS
Status: DISCONTINUED | OUTPATIENT
Start: 2023-02-22 | End: 2023-02-24 | Stop reason: HOSPADM

## 2023-02-22 RX ORDER — SODIUM CHLORIDE 0.9 % (FLUSH) 0.9 %
10 SYRINGE (ML) INJECTION EVERY 12 HOURS PRN
Status: DISCONTINUED | OUTPATIENT
Start: 2023-02-22 | End: 2023-02-24 | Stop reason: HOSPADM

## 2023-02-22 RX ADMIN — METOPROLOL SUCCINATE 50 MG: 50 TABLET, FILM COATED, EXTENDED RELEASE ORAL at 08:02

## 2023-02-22 RX ADMIN — LISINOPRIL 20 MG: 20 TABLET ORAL at 08:02

## 2023-02-22 RX ADMIN — LORAZEPAM 0.5 MG: 0.5 TABLET ORAL at 09:02

## 2023-02-22 RX ADMIN — AMIODARONE HYDROCHLORIDE 1 MG/MIN: 1.8 INJECTION, SOLUTION INTRAVENOUS at 11:02

## 2023-02-22 RX ADMIN — PANTOPRAZOLE SODIUM 40 MG: 40 INJECTION, POWDER, FOR SOLUTION INTRAVENOUS at 11:02

## 2023-02-22 RX ADMIN — LORAZEPAM 0.5 MG: 0.5 TABLET ORAL at 11:02

## 2023-02-22 RX ADMIN — FUROSEMIDE 40 MG: 10 INJECTION, SOLUTION INTRAMUSCULAR; INTRAVENOUS at 11:02

## 2023-02-22 RX ADMIN — POTASSIUM BICARBONATE 20 MEQ: 782 TABLET, EFFERVESCENT ORAL at 11:02

## 2023-02-22 RX ADMIN — SPIRONOLACTONE 25 MG: 25 TABLET ORAL at 11:02

## 2023-02-22 RX ADMIN — POTASSIUM BICARBONATE 20 MEQ: 782 TABLET, EFFERVESCENT ORAL at 09:02

## 2023-02-22 RX ADMIN — LORAZEPAM 0.25 MG: 2 INJECTION INTRAMUSCULAR; INTRAVENOUS at 12:02

## 2023-02-22 RX ADMIN — ASPIRIN 81 MG CHEWABLE TABLET 81 MG: 81 TABLET CHEWABLE at 08:02

## 2023-02-22 RX ADMIN — FUROSEMIDE 40 MG: 10 INJECTION, SOLUTION INTRAMUSCULAR; INTRAVENOUS at 12:02

## 2023-02-22 RX ADMIN — AMIODARONE HYDROCHLORIDE 150 MG: 1.5 INJECTION, SOLUTION INTRAVENOUS at 11:02

## 2023-02-22 NOTE — SUBJECTIVE & OBJECTIVE
Past Medical History:   Diagnosis Date    Coronary artery disease     Hypertension     MI (myocardial infarction)     Thyroid disease        Past Surgical History:   Procedure Laterality Date    CARDIAC PACEMAKER PLACEMENT         Review of patient's allergies indicates:   Allergen Reactions    Bactrim [sulfamethoxazole-trimethoprim] Shortness Of Breath    Statins-hmg-coa reductase inhibitors Other (See Comments)     Statin intolerance. Muscle weakness    Wellbutrin [bupropion hcl] Other (See Comments)     Abdominal pain        No current facility-administered medications on file prior to encounter.     Current Outpatient Medications on File Prior to Encounter   Medication Sig    aspirin 81 MG Chew Take 1 tablet (81 mg total) by mouth once daily.    [] cephALEXin (KEFLEX) 500 MG capsule Take 1 capsule (500 mg total) by mouth 4 (four) times daily. for 5 days    metoprolol succinate (TOPROL-XL) 25 MG 24 hr tablet Take 1 tablet (25 mg total) by mouth once daily.    furosemide (LASIX) 40 MG tablet Take 40 mg by mouth once daily.    hydrOXYzine HCL (ATARAX) 25 MG tablet Take 25 mg by mouth 3 (three) times daily as needed.    lisinopriL (PRINIVIL,ZESTRIL) 20 MG tablet Take 1 tablet (20 mg total) by mouth once daily.    MULTIVITAMIN ORAL Take 1 tablet by mouth once daily.    nitroGLYCERIN (NITROSTAT) 0.4 MG SL tablet Place 0.4 mg under the tongue every 5 (five) minutes as needed.    pantoprazole (PROTONIX) 40 MG tablet Take 40 mg by mouth once daily.    potassium chloride (KLOR-CON) 10 MEQ TbSR Take 10 mEq by mouth once daily.    tamsulosin (FLOMAX) 0.4 mg Cap Take 1 capsule by mouth once daily.    [DISCONTINUED] albuterol (PROVENTIL HFA) 90 mcg/actuation inhaler Inhale 2 puffs into the lungs every 4 (four) hours as needed for Wheezing or Shortness of Breath. Rescue    [DISCONTINUED] buPROPion (WELLBUTRIN XL) 300 MG 24 hr tablet Take one tablet daily in the morning as directed.    [DISCONTINUED] metoprolol tartrate  (LOPRESSOR) 50 MG tablet Take 50 mg by mouth 2 (two) times daily.    [DISCONTINUED] mexiletine (MEXITIL) 150 MG Cap Take by mouth.     Family History       Problem Relation (Age of Onset)    Fibromyalgia Mother    Heart disease Father    Hyperlipidemia Father          Tobacco Use    Smoking status: Every Day     Packs/day: 1.00     Years: 40.00     Pack years: 40.00     Types: Cigarettes    Smokeless tobacco: Never   Substance and Sexual Activity    Alcohol use: Yes     Comment: 12 pack beer daily    Drug use: No    Sexual activity: Not on file     Review of Systems   Constitutional:  Negative for activity change, appetite change and diaphoresis.   HENT:  Negative for congestion, facial swelling and sinus pressure.    Respiratory:  Negative for shortness of breath and wheezing.    Cardiovascular:  Negative for chest pain and palpitations.   Gastrointestinal:  Negative for abdominal distention and abdominal pain.   Genitourinary:  Negative for dysuria.   Skin:  Negative for color change and pallor.   Neurological:  Negative for dizziness, facial asymmetry, speech difficulty and headaches.   Psychiatric/Behavioral:  Negative for agitation and confusion.    Objective:     Vital Signs (Most Recent):  Temp: 97.8 °F (36.6 °C) (02/21/23 2134)  Pulse: 83 (02/22/23 0015)  Resp: (!) 21 (02/22/23 0015)  BP: 138/88 (02/22/23 0000)  SpO2: 97 % (02/22/23 0015)   Vital Signs (24h Range):  Temp:  [97.8 °F (36.6 °C)] 97.8 °F (36.6 °C)  Pulse:  [81-94] 83  Resp:  [16-25] 21  SpO2:  [97 %-99 %] 97 %  BP: (120-153)/(74-89) 138/88     Weight: 97.1 kg (214 lb)  Body mass index is 28.23 kg/m².    Physical Exam  Constitutional:       General: He is not in acute distress.     Appearance: He is well-developed.   HENT:      Head: Normocephalic.   Eyes:      Pupils: Pupils are equal, round, and reactive to light.   Cardiovascular:      Rate and Rhythm: Normal rate and regular rhythm.   Pulmonary:      Effort: Pulmonary effort is normal. No  respiratory distress.   Abdominal:      General: Abdomen is flat. There is no distension.      Tenderness: There is no abdominal tenderness.   Musculoskeletal:         General: Normal range of motion.      Cervical back: Neck supple.   Skin:     Findings: No rash.   Neurological:      Mental Status: He is alert and oriented to person, place, and time.   Psychiatric:         Mood and Affect: Mood normal.         CRANIAL NERVES     CN III, IV, VI   Pupils are equal, round, and reactive to light.     Significant Labs: All pertinent labs within the past 24 hours have been reviewed.  BMP:   Recent Labs   Lab 02/21/23  2251   GLU 94   *   K 3.6      CO2 23   BUN 10   CREATININE 0.7   CALCIUM 9.1     CBC:   Recent Labs   Lab 02/21/23 2251   WBC 9.05   HGB 12.1*   HCT 37.3*        CMP:   Recent Labs   Lab 02/21/23 2251   *   K 3.6      CO2 23   GLU 94   BUN 10   CREATININE 0.7   CALCIUM 9.1   PROT 7.6   ALBUMIN 3.7   BILITOT 0.4   ALKPHOS 78   AST 24   ALT 22   ANIONGAP 11       Significant Imaging: I have reviewed all pertinent imaging results/findings within the past 24 hours.

## 2023-02-22 NOTE — PROGRESS NOTES
"[facility] Medicine Progress Note  Patient Name: Brandon Rivas MRN: [id number]   Patient Class: OP- Observation  Length of Stay: 0   Admission Date: 2/21/2023  9:40 PM Attending Physician: Estevan Emanuel MD   Primary Care Provider: James Garcia MD Face-to-Face encounter date: 02/22/2023   Chief Complaint: Pacemaker Problem (Pt states he thinks his defibrillator went off.  Pt states it woke him from sleep.  [employer] pacemaker/defibrillator)      Subjective:    Interval History   Reports nausea and vomiting and requesting antiemetics  Still short of breath when tries to sleep  Denies chest pain,  palpitations, abdominal pain, n  No concerns/issues overnight reported by the patient or the nursing staff.  Reviewed the labs and discussed the plan of care.   No family present at bedside.     Review of Systems   All other Review of Systems were found to be negative expect for that mentioned already in HPI.     Hospital Course     02/22/2023     amiodarone in dextrose  150 mg Intravenous Once    aspirin  81 mg Oral Daily    enoxaparin  40 mg Subcutaneous Daily    furosemide (LASIX) injection  40 mg Intravenous Q12H    lisinopriL  20 mg Oral Daily    metoprolol succinate  50 mg Oral Daily    potassium bicarbonate  20 mEq Oral BID    spironolactone  25 mg Oral Daily    tamsulosin  1 capsule Oral Daily       dextrose 10%, dextrose 10%, glucagon (human recombinant), glucose, glucose, naloxone, nitroGLYCERIN, sodium chloride 0.9%      Objective:   Physical Exam  /80   Pulse 81   Temp 97.8 °F (36.6 °C) (Oral)   Resp 20   Ht 6' 1" (1.854 m)   Wt 97.1 kg (214 lb)   SpO2 95%   BMI 28.23 kg/m²   Constitutional: No distress.   HENT: Atraumatic.   Cardiovascular: Normal rate, regular rhythm and normal heart sounds.   Pulmonary/Chest: Effort normal. Clear to auscultation bilaterally. No wheezes.   Abdominal: Soft. Bowel sounds are normal. Exhibits no distension and no mass. No " tenderness  Neurological: Alert.   Skin: Skin is warm and dry.     Labs and Imaging    Significant Labs: All pertinent labs within the past 24 hours have been reviewed.    Significant Imaging: I have reviewed all pertinent imaging results/findings within the past 24 hours.    I have reviewed the Vitals, labs and imaging as above.     Assessment & Plan:   Brandon Rivas is a 67 y.o. male admitted for    Active Hospital Problems    Diagnosis    *Acute systolic heart failure    appropriate shocks from ICD (implantable cardioverter-defibrillator)    Thyroid disease    Kidney stone    Adrenal nodule    Ventricular tachycardia    Other emphysema    Acute ischemic right MCA stroke    Alcohol dependence    Coronary artery disease with unstable angina pectoris    Hyperlipidemia    PVD (peripheral vascular disease)       Plan  Continue IV lasix, strict I&O, daily weights  Replace electrolytes  Cardiology consulted  Echo pending  Phenergan ordered for nausea    Active PT: No  Active OT: No  Active SLP: No    Core measures:  - Code status:   - Diet: Cardiac  VTE Risk Mitigation (From admission, onward)           Ordered     enoxaparin injection 40 mg  Daily         02/22/23 0040     IP VTE HIGH RISK PATIENT  Once         02/22/23 0040     Place sequential compression device  Until discontinued         02/22/23 0040                    Discharge Planning:   Discharge Planning   LARY: 02/24    Code Status: Full Code   Is the patient medically ready for discharge?: no    Reason for patient still in hospital (select all that apply): Patient trending condition  Discharge Plan A: Home with assistance from family        Above encounter included review of the medical records, interviewing and examining the patient face-to-face, discussion with family and other health care providers, ordering and interpreting lab/test results and formulating a plan of care.     Medical Decision Making:  [] Low Complexity  [x] Moderate Complexity  []  High Complexity    Estevan Emanuel MD  [facility] Hospitalist  02/22/2023

## 2023-02-22 NOTE — CONSULTS
[facility] - Emergency Dept  Cardiology  Consult Note    Patient Name: Brandon Rivas  MRN: [id number]  Admission Date: 2/21/2023  Hospital Length of Stay: 0 days  Code Status: Full Code   Attending Provider: Estevan Emanuel MD   Consulting Provider: Erlin Parry MD  Primary Care Physician: James Garcia MD  Principal Problem:Acute systolic heart failure    Patient information was obtained from patient, past medical records, and ER records.     Consults  Subjective:     Chief Complaint:  AICD shocks     HPI:  Problem 1 patient was at home developed AICD shocks.  Has a Medtronic single VVI defibrillator and he is had ventricular tachycardia in the past and has been shocked he has been unable to tolerate mexiletine.  Not been on amiodarone  His potassium level has been low  Currently is in sinus rhythm  He has history of atrial fib but is not on blood thinners  2. Has a history of ASCVD and has had bypass surgery x2 with depressed ejection fraction and old anterior apical scar  He is not having chest pain  3. He is had chronic abdominal pain  Recent evaluation ER in [place] show CT ABDOMEN AND PELVIS WITH CONTRAST History: Generalized abdominal pain Contrast: 100 mL Isovue-370 Comparison: 1/19/2023 Findings: Small bilateral pleural effusions. Osseous structures are normal. Mild aortic atherosclerosis without aneurysmal dilation. 8.5 mm nonobstructing left intrarenal calculus. No further urolithiasis or obstructive uropathy. Simple left renal cortical cyst. Stable enlarged, fatty attenuating left adrenal gland mass felt be on the basis of prominent adenoma measuring 5.8 x 2.6 x 2.6 cm in greatest caudal, AP and transverse dimensions respectively. Postsurgical changes of anterior abdomen consistent with ventral hernia repair and mesh placement. The remaining upper abdominal viscera are unremarkable. Lower abdomen and pelvis demonstrates sigmoid diverticulosis.    He is not on  medication for his colon or on PPI  4. He is not drinking or smoking   5. Analysis of Medtronic AICD demonstrates that he was shocked this time because of supraventricular tachycardia not ventricular tachycardia       Past Medical History:   Diagnosis Date    Coronary artery disease     Hypertension     MI (myocardial infarction)     Thyroid disease        Past Surgical History:   Procedure Laterality Date    CARDIAC PACEMAKER PLACEMENT         Review of patient's allergies indicates:   Allergen Reactions    Bactrim [sulfamethoxazole-trimethoprim] Shortness Of Breath    Statins-hmg-coa reductase inhibitors Other (See Comments)     Statin intolerance. Muscle weakness    Wellbutrin [bupropion hcl] Other (See Comments)     Abdominal pain        No current facility-administered medications on file prior to encounter.     Current Outpatient Medications on File Prior to Encounter   Medication Sig    aspirin 81 MG Chew Take 1 tablet (81 mg total) by mouth once daily.    [] cephALEXin (KEFLEX) 500 MG capsule Take 1 capsule (500 mg total) by mouth 4 (four) times daily. for 5 days    metoprolol succinate (TOPROL-XL) 25 MG 24 hr tablet Take 1 tablet (25 mg total) by mouth once daily.    furosemide (LASIX) 40 MG tablet Take 40 mg by mouth once daily.    hydrOXYzine HCL (ATARAX) 25 MG tablet Take 25 mg by mouth 3 (three) times daily as needed.    lisinopriL (PRINIVIL,ZESTRIL) 20 MG tablet Take 1 tablet (20 mg total) by mouth once daily.    MULTIVITAMIN ORAL Take 1 tablet by mouth once daily.    nitroGLYCERIN (NITROSTAT) 0.4 MG SL tablet Place 0.4 mg under the tongue every 5 (five) minutes as needed.    pantoprazole (PROTONIX) 40 MG tablet Take 40 mg by mouth once daily.    potassium chloride (KLOR-CON) 10 MEQ TbSR Take 10 mEq by mouth once daily.    tamsulosin (FLOMAX) 0.4 mg Cap Take 1 capsule by mouth once daily.    [DISCONTINUED] albuterol (PROVENTIL HFA) 90 mcg/actuation inhaler Inhale 2 puffs into the lungs every 4  (four) hours as needed for Wheezing or Shortness of Breath. Rescue    [DISCONTINUED] buPROPion (WELLBUTRIN XL) 300 MG 24 hr tablet Take one tablet daily in the morning as directed.    [DISCONTINUED] metoprolol tartrate (LOPRESSOR) 50 MG tablet Take 50 mg by mouth 2 (two) times daily.    [DISCONTINUED] mexiletine (MEXITIL) 150 MG Cap Take by mouth.     Family History       Problem Relation (Age of Onset)    Fibromyalgia Mother    Heart disease Father    Hyperlipidemia Father          Tobacco Use    Smoking status: Every Day     Packs/day: 1.00     Years: 40.00     Pack years: 40.00     Types: Cigarettes    Smokeless tobacco: Never   Substance and Sexual Activity    Alcohol use: Yes     Comment: 12 pack beer daily    Drug use: No    Sexual activity: Not on file     Review of Systems   Constitutional: Positive for weight gain.   Cardiovascular:  Positive for chest pain and irregular heartbeat.   Respiratory:  Positive for shortness of breath.    Musculoskeletal:  Positive for arthritis.   Gastrointestinal:  Positive for bloating, abdominal pain, flatus, heartburn and nausea.   Genitourinary:  Positive for frequency. Negative for flank pain.   Objective:     Vital Signs (Most Recent):  Temp: 97.8 °F (36.6 °C) (02/21/23 2134)  Pulse: 81 (02/22/23 0915)  Resp: 20 (02/22/23 0915)  BP: 121/80 (02/22/23 0829)  SpO2: 95 % (02/22/23 0915) Vital Signs (24h Range):  Temp:  [97.8 °F (36.6 °C)] 97.8 °F (36.6 °C)  Pulse:  [74-94] 81  Resp:  [16-25] 20  SpO2:  [95 %-99 %] 95 %  BP: (120-153)/(72-89) 121/80     Weight: 97.1 kg (214 lb)  Body mass index is 28.23 kg/m².    SpO2: 95 %         Intake/Output Summary (Last 24 hours) at 2/22/2023 1038  Last data filed at 2/22/2023 0505  Gross per 24 hour   Intake --   Output 3000 ml   Net -3000 ml       Lines/Drains/Airways       Peripheral Intravenous Line  Duration                  Peripheral IV - Single Lumen 02/21/23 2252 20 G Left Antecubital <1 day                    Physical Exam  pulse is 80  No carotid bruits  Lungs are clear  Cardiac regular with premature contraction  Abdomen mild tenderness  Extremities no phlebitis edema  Neurologic intact    Significant Labs: CMP   Recent Labs   Lab 02/21/23  2251 02/22/23  0500   * 136   K 3.6 3.6    105   CO2 23 24   GLU 94 97   BUN 10 8   CREATININE 0.7 0.7   CALCIUM 9.1 8.8   PROT 7.6  --    ALBUMIN 3.7  --    BILITOT 0.4  --    ALKPHOS 78  --    AST 24  --    ALT 22  --    ANIONGAP 11 7*   , CBC   Recent Labs   Lab 02/21/23 2251 02/22/23  0500   WBC 9.05 8.08   HGB 12.1* 12.1*   HCT 37.3* 37.2*    316   , and Troponin No results for input(s): TROPONINI in the last 48 hours.    Significant Imaging:  Slight cardiomegaly  Assessment and Plan:   1. Recurrent defibrillator shocks this time due to supraventricular tachycardia  We will go ahead and get potassium greater than 4 and start amiodarone  If he continues having episodes he will need an atrial lead for the VVI defibrillator  2. Significant GI symptoms it is not clear the patient took the omeprazole at home  3. Intolerance of mexiletine   Active Diagnoses:    Diagnosis Date Noted POA    PRINCIPAL PROBLEM:  Acute systolic heart failure [I50.21] 12/18/2016 Yes    appropriate shocks from ICD (implantable cardioverter-defibrillator) [T82.198A] 02/22/2023 Unknown    Thyroid disease [E07.9]  Unknown    Kidney stone [N20.0]  Unknown    Adrenal nodule [E27.8]  Unknown    Ventricular tachycardia [I47.20] 03/24/2022 Yes    Other emphysema [J43.8] 03/24/2022 Yes    Acute ischemic right MCA stroke [I63.511] 05/23/2018 Yes    Alcohol dependence [F10.20] 12/18/2016 Yes    Coronary artery disease with unstable angina pectoris [I25.110] 12/18/2016 Yes    Hyperlipidemia [E78.5] 12/18/2016 Yes    PVD (peripheral vascular disease) [I73.9] 12/18/2016 Yes      Problems Resolved During this Admission:     I substantially and  personally reviewed old and new ecg's, lab reports,, xray reports  and   other cardiovascular studies including  echo's, stress tests, angiogram reports, holters,and vascular studies .  In addition I evaluated original cardiac cath  ___echo  ____cxr ______ct ____scan on [employer] view or [employer] or other viewing platforms and  __x__EKG's (  medtron  report  ecg now nsr .   I reviewed  office and hospital notes Yes ____ of  referring providers and other providers.  I reviewed personally old hospital and office notes   Time spent evaluating and managing this patient:___60_____min.     VTE Risk Mitigation (From admission, onward)           Ordered     enoxaparin injection 40 mg  Daily         02/22/23 0040     IP VTE HIGH RISK PATIENT  Once         02/22/23 0040     Place sequential compression device  Until discontinued         02/22/23 0040                    Thank you for your consult.     Erlin Parry MD  Cardiology   [facility] - Emergency Dept

## 2023-02-22 NOTE — HPI
67  year old male with PMH of old MI, CAD with stent, hyperlipidemia, hypertension, tobacco and alcohol dependency , s/o CHF and s/o AICD , CABG history ,possible stroke history came to ER with defibrillator shock.  H was going to sleep around 830 when he felt his defibrillator shocked him one  time. He had one shock in the past many years ago .  denied any chest pain, palpitations, worsening shortness of breath, lightheadedness, nausea/vomiting.  Patient was recently in the [facility] when he was treated for urinary tract infection or kidney infection which is unclear but finished the antibiotics just recently.  He also complained of indigestion and abdominal bloating  He does not have much of peripheral edema but BNP is  severely elevated and admitted for the heart failure exacerbation.  Later ICD interrogation was done and found to have SVT and appropriately shocked.  In the past patient had history of transient atrial fibrillation less than 24 hour in the interrogation.  Patient reports 2 episodes of GI bleeding in the past and currently not on blood thinners.  Patient follow up with Dr. Joyce as outpatient.  Patient is poor historian and talk  tangentially.  Official interrogation report to follow.

## 2023-02-22 NOTE — PLAN OF CARE
[facility] - Emergency Dept  Initial Discharge Assessment       Primary Care Provider: James Garcia MD    Admission Diagnosis: SVT (supraventricular tachycardia) [I47.1]    Admission Date: 2/21/2023  Expected Discharge Date:     Social work intern met with Pt at bedside to complete discharge assessment. Pt AAOx4s. Demographics, PCP, and insurance verified. No home health. No dialysis. Pt reports ability to complete ADLs without assistance. FENTON discussed and acknowledged by pt. Pt verbalized plan to discharge home via family transport. Pt has no other needs to be addressed at this time.    Discharge Barriers Identified: None    Payor: MEDICARE / Plan: MEDICARE PART A & B / Product Type: Government /     Extended Emergency Contact Information  Primary Emergency Contact: Jerzy Rivas  Mobile Phone: [phone]  Relation: Brother    Discharge Plan A: Home  Discharge Plan B: [facility] - [place], [place] - [address]  [address] [address]  Phone: [phone] Fax: [phone]    Count includes the [facility] - [place], [place] - [address] [address]  Phone: [phone] Fax: [phone]      Initial Assessment (most recent)       Adult Discharge Assessment - 02/22/23 1039          Discharge Assessment    Assessment Type Discharge Planning Assessment     Confirmed/corrected address, phone number and insurance Yes     Confirmed Demographics Correct on Facesheet     Source of Information patient     Does patient/caregiver understand observation status Yes   Pt discussed and acknowledged FENTON    Communicated LARY with patient/caregiver Date not available/Unable to determine     Reason For Admission Acute systolic heart failure     People in Home alone     Facility Arrived From: home     Do you expect to return to your current living situation? Yes     Do you have help at home or someone to help you manage your care at home?  Yes     Who are your caregiver(s) and their phone number(s)? Jerzy Rivas (Brother)   [phone] (Mobile)     Prior to hospitilization cognitive status: Alert/Oriented     Current cognitive status: Alert/Oriented     Equipment Currently Used at Home none     Readmission within 30 days? No     Patient currently being followed by outpatient case management? No     Do you currently have service(s) that help you manage your care at home? No     Do you take prescription medications? Yes     Do you have prescription coverage? Yes     Coverage Payor:  MEDICARE - MEDICARE PART A & B     Do you have any problems affording any of your prescribed medications? No     Is the patient taking medications as prescribed? yes     Who is going to help you get home at discharge? Jerzy Rivas (Brother)   [phone] (Mobile)     How do you get to doctors appointments? car, drives self     Are you on dialysis? No     Do you take coumadin? No     Discharge Plan A Home     Discharge Plan B Home     DME Needed Upon Discharge  none     Discharge Plan discussed with: Patient     Discharge Barriers Identified None        Physical Activity    On average, how many days per week do you engage in moderate to strenuous exercise (like a brisk walk)? 0 days     On average, how many minutes do you engage in exercise at this level? 0 min        Financial Resource Strain    How hard is it for you to pay for the very basics like food, housing, medical care, and heating? Somewhat hard        Housing Stability    In the last 12 months, was there a time when you were not able to pay the mortgage or rent on time? No     In the last 12 months, how many places have you lived? 1     In the last 12 months, was there a time when you did not have a steady place to sleep or slept in a shelter (including now)? No        Transportation Needs    In the past 12 months, has lack of transportation kept you from medical appointments or from getting medications? No     In  the past 12 months, has lack of transportation kept you from meetings, work, or from getting things needed for daily living? No        Food Insecurity    Within the past 12 months, you worried that your food would run out before you got the money to buy more. Never true     Within the past 12 months, the food you bought just didn't last and you didn't have money to get more. Never true        Stress    Do you feel stress - tense, restless, nervous, or anxious, or unable to sleep at night because your mind is troubled all the time - these days? Rather much        Social Connections    In a typical week, how many times do you talk on the phone with family, friends, or neighbors? More than three times a week     How often do you get together with friends or relatives? Three times a week     How often do you attend Buddhism or Evangelical services? Never     Do you belong to any clubs or organizations such as Buddhism groups, unions, fraternal or athletic groups, or school groups? No     How often do you attend meetings of the clubs or organizations you belong to? Never     Are you , , , , never , or living with a partner?         Alcohol Use    Q1: How often do you have a drink containing alcohol? Never     Q2: How many drinks containing alcohol do you have on a typical day when you are drinking? Patient does not drink     Q3: How often do you have six or more drinks on one occasion? Never

## 2023-02-22 NOTE — H&P
[facility] Dept  Hospital Medicine  History & Physical    Patient Name: Brandon Rivas  MRN: [id number]  Patient Class: OP- Observation  Admission Date: 2/21/2023  Attending Physician: Fuentes Puentes MD   Primary Care Provider: James Garcia MD         Patient information was obtained from patient and ER records.     Subjective:     Principal Problem:Acute systolic heart failure    Chief Complaint:   Chief Complaint   Patient presents with    Pacemaker Problem     Pt states he thinks his defibrillator went off.  Pt states it woke him from sleep.  Medtronic pacemaker/defibrillator        HPI: 67  year old male with PMH of old MI, CAD with stent, hyperlipidemia, hypertension, tobacco and alcohol dependency , s/o CHF and s/o AICD , CABG history ,possible stroke history came to ER with defibrillator shock.  H was going to sleep around 830 when he felt his defibrillator shocked him one  time. He had one shock in the past many years ago .  denied any chest pain, palpitations, worsening shortness of breath, lightheadedness, nausea/vomiting.  Patient was recently in the [facility] when he was treated for urinary tract infection or kidney infection which is unclear but finished the antibiotics just recently.  He also complained of indigestion and abdominal bloating  He does not have much of peripheral edema but BNP is  severely elevated and admitted for the heart failure exacerbation.  Later ICD interrogation was done and found to have SVT and appropriately shocked.  In the past patient had history of transient atrial fibrillation less than 24 hour in the interrogation.  Patient reports 2 episodes of GI bleeding in the past and currently not on blood thinners.  Patient follow up with Dr. Joyce as outpatient.  Patient is poor historian and talk  tangentially.  Official interrogation report to follow.      Past Medical History:   Diagnosis Date    Coronary artery disease      Hypertension     MI (myocardial infarction)     Thyroid disease        Past Surgical History:   Procedure Laterality Date    CARDIAC PACEMAKER PLACEMENT         Review of patient's allergies indicates:   Allergen Reactions    Bactrim [sulfamethoxazole-trimethoprim] Shortness Of Breath    Statins-hmg-coa reductase inhibitors Other (See Comments)     Statin intolerance. Muscle weakness    Wellbutrin [bupropion hcl] Other (See Comments)     Abdominal pain        No current facility-administered medications on file prior to encounter.     Current Outpatient Medications on File Prior to Encounter   Medication Sig    aspirin 81 MG Chew Take 1 tablet (81 mg total) by mouth once daily.    [] cephALEXin (KEFLEX) 500 MG capsule Take 1 capsule (500 mg total) by mouth 4 (four) times daily. for 5 days    metoprolol succinate (TOPROL-XL) 25 MG 24 hr tablet Take 1 tablet (25 mg total) by mouth once daily.    furosemide (LASIX) 40 MG tablet Take 40 mg by mouth once daily.    hydrOXYzine HCL (ATARAX) 25 MG tablet Take 25 mg by mouth 3 (three) times daily as needed.    lisinopriL (PRINIVIL,ZESTRIL) 20 MG tablet Take 1 tablet (20 mg total) by mouth once daily.    MULTIVITAMIN ORAL Take 1 tablet by mouth once daily.    nitroGLYCERIN (NITROSTAT) 0.4 MG SL tablet Place 0.4 mg under the tongue every 5 (five) minutes as needed.    pantoprazole (PROTONIX) 40 MG tablet Take 40 mg by mouth once daily.    potassium chloride (KLOR-CON) 10 MEQ TbSR Take 10 mEq by mouth once daily.    tamsulosin (FLOMAX) 0.4 mg Cap Take 1 capsule by mouth once daily.    [DISCONTINUED] albuterol (PROVENTIL HFA) 90 mcg/actuation inhaler Inhale 2 puffs into the lungs every 4 (four) hours as needed for Wheezing or Shortness of Breath. Rescue    [DISCONTINUED] buPROPion (WELLBUTRIN XL) 300 MG 24 hr tablet Take one tablet daily in the morning as directed.    [DISCONTINUED] metoprolol tartrate (LOPRESSOR) 50 MG tablet Take 50 mg by mouth  2 (two) times daily.    [DISCONTINUED] mexiletine (MEXITIL) 150 MG Cap Take by mouth.     Family History       Problem Relation (Age of Onset)    Fibromyalgia Mother    Heart disease Father    Hyperlipidemia Father          Tobacco Use    Smoking status: Every Day     Packs/day: 1.00     Years: 40.00     Pack years: 40.00     Types: Cigarettes    Smokeless tobacco: Never   Substance and Sexual Activity    Alcohol use: Yes     Comment: 12 pack beer daily    Drug use: No    Sexual activity: Not on file     Review of Systems   Constitutional:  Negative for activity change, appetite change and diaphoresis.   HENT:  Negative for congestion, facial swelling and sinus pressure.    Respiratory:  Negative for shortness of breath and wheezing.    Cardiovascular:  Negative for chest pain and palpitations.   Gastrointestinal:  Negative for abdominal distention and abdominal pain.   Genitourinary:  Negative for dysuria.   Skin:  Negative for color change and pallor.   Neurological:  Negative for dizziness, facial asymmetry, speech difficulty and headaches.   Psychiatric/Behavioral:  Negative for agitation and confusion.    Objective:     Vital Signs (Most Recent):  Temp: 97.8 °F (36.6 °C) (02/21/23 2134)  Pulse: 83 (02/22/23 0015)  Resp: (!) 21 (02/22/23 0015)  BP: 138/88 (02/22/23 0000)  SpO2: 97 % (02/22/23 0015)   Vital Signs (24h Range):  Temp:  [97.8 °F (36.6 °C)] 97.8 °F (36.6 °C)  Pulse:  [81-94] 83  Resp:  [16-25] 21  SpO2:  [97 %-99 %] 97 %  BP: (120-153)/(74-89) 138/88     Weight: 97.1 kg (214 lb)  Body mass index is 28.23 kg/m².    Physical Exam  Constitutional:       General: He is not in acute distress.     Appearance: He is well-developed.   HENT:      Head: Normocephalic.   Eyes:      Pupils: Pupils are equal, round, and reactive to light.   Cardiovascular:      Rate and Rhythm: Normal rate and regular rhythm.   Pulmonary:      Effort: Pulmonary effort is normal. No respiratory distress.   Abdominal:       General: Abdomen is flat. There is no distension.      Tenderness: There is no abdominal tenderness.   Musculoskeletal:         General: Normal range of motion.      Cervical back: Neck supple.   Skin:     Findings: No rash.   Neurological:      Mental Status: He is alert and oriented to person, place, and time.   Psychiatric:         Mood and Affect: Mood normal.         CRANIAL NERVES     CN III, IV, VI   Pupils are equal, round, and reactive to light.     Significant Labs: All pertinent labs within the past 24 hours have been reviewed.  BMP:   Recent Labs   Lab 02/21/23  2251   GLU 94   *   K 3.6      CO2 23   BUN 10   CREATININE 0.7   CALCIUM 9.1     CBC:   Recent Labs   Lab 02/21/23 2251   WBC 9.05   HGB 12.1*   HCT 37.3*        CMP:   Recent Labs   Lab 02/21/23 2251   *   K 3.6      CO2 23   GLU 94   BUN 10   CREATININE 0.7   CALCIUM 9.1   PROT 7.6   ALBUMIN 3.7   BILITOT 0.4   ALKPHOS 78   AST 24   ALT 22   ANIONGAP 11       Significant Imaging: I have reviewed all pertinent imaging results/findings within the past 24 hours.    Assessment/Plan:     Active Hospital Problems    Diagnosis    appropriate shocks from ICD (implantable cardioverter-defibrillator) for SVT  Heart failure exacerbation     Thyroid disease    Kidney stone    Adrenal nodule    Ventricular tachycardia history     Other emphysema    Acute ischemic right MCA stroke    Alcohol dependence    Coronary artery disease with unstable angina pectoris    Hyperlipidemia    PVD (peripheral vascular disease)    Acute systolic heart failure       PLAN   IV lasix  Strict intake and output   Cardio Cx in am   Follow official interrogation report   His last  Drink was 4 months ago and no signs of withdrawal  Mild troponin elevation from ICD shock . No CP        VTE Risk Mitigation (From admission, onward)         Ordered     enoxaparin injection 40 mg  Daily         02/22/23 0040     IP VTE HIGH RISK PATIENT   Once         02/22/23 0040     Place sequential compression device  Until discontinued         02/22/23 0040                   Fuentes Puentes MD  Department of Hospital Medicine   [facility] - Emergency Dept

## 2023-02-22 NOTE — ED NOTES
Patient identifiers for Brandon Rivas checked and correct.  LOC:  Brandon Rivas is awake, alert, and aware of environment with an appropriate affect. He is oriented x 3 and speaking appropriately.  APPEARANCE:  He is resting comfortably and in no acute distress. He is clean and well groomed, patient's clothing is properly fastened.  SKIN:  The skin is warm and dry. He has normal skin turgor and moist mucus membranes. Skin is intact; no bruising or breakdown noted.  MUSCULOSKELETAL:  He is moving all extremities well, no obvious deformities noted. Pulses intact. He does have edema to bilateral feet and legs.  RESPIRATORY:  Airway is open and patent. Respirations are spontaneous and non-labored with normal effort and rate. There is some shortness of breath noted.  CARDIAC:  He has a normal rate and rhythm. No peripheral edema noted. Capillary refill < 3 seconds.  ABDOMEN:  No distention noted.  Soft and non-tender upon palpation.  NEUROLOGICAL:  PERRL. Facial expression is symmetrical. Hand grasps are equal bilaterally. Normal sensation in all extremities when touched with finger.  Allergies reported:    Review of patient's allergies indicates:   Allergen Reactions    Bactrim [sulfamethoxazole-trimethoprim] Shortness Of Breath    Statins-hmg-coa reductase inhibitors Other (See Comments)     Statin intolerance. Muscle weakness    Wellbutrin [bupropion hcl] Other (See Comments)     Abdominal pain      OTHER NOTES:  Brandon Rivas is here with family.

## 2023-02-22 NOTE — ED PROVIDER NOTES
Encounter Date: 2/21/2023       History     Chief Complaint   Patient presents with    Pacemaker Problem     Pt states he thinks his defibrillator went off.  Pt states it woke him from sleep.  Medtronic pacemaker/defibrillator     HPI  Patient is a 67-year-old male with past medical history of CAD, previous MI with pacemaker, HTN, and systolic heart failure who presents after possible defibrillator shock. History a little difficult to obtain as patient has some tangential thought process.    Patient says he was going to sleep around 830 when he felt his defibrillator shocked him a single time.  At the time of defibrillation, denied any chest pain, palpitations, worsening shortness of breath, lightheadedness, nausea/vomiting.  Patient says that he just recently finished a course of Keflex for a abscess which has been improving.  States normal bowel movements, no abdominal pain, and no recent fever/chills.  Difficult to ascertain if patient has been taking his medication as prescribed as he cites various side effects with each medication that limits his ability to take said medications.  Says that his peripheral edema is unchanged from baseline, no cough or congestion.      Review of patient's allergies indicates:   Allergen Reactions    Bactrim [sulfamethoxazole-trimethoprim] Shortness Of Breath    Statins-hmg-coa reductase inhibitors Other (See Comments)     Statin intolerance. Muscle weakness    Wellbutrin [bupropion hcl] Other (See Comments)     Abdominal pain      Past Medical History:   Diagnosis Date    Coronary artery disease     Hypertension     MI (myocardial infarction)     Thyroid disease      Past Surgical History:   Procedure Laterality Date    CARDIAC PACEMAKER PLACEMENT       Family History   Problem Relation Age of Onset    Fibromyalgia Mother     Heart disease Father     Hyperlipidemia Father      Social History     Tobacco Use    Smoking status: Every Day     Packs/day: 1.00     Years: 40.00      Pack years: 40.00     Types: Cigarettes    Smokeless tobacco: Never   Substance Use Topics    Alcohol use: Yes     Comment: 12 pack beer daily    Drug use: No     Review of Systems   Constitutional:  Negative for appetite change, chills, fatigue and fever.   HENT:  Negative for ear pain and sore throat.    Eyes:  Negative for photophobia, redness and visual disturbance.   Respiratory:  Positive for shortness of breath (Chronic). Negative for cough and wheezing.    Cardiovascular:  Negative for chest pain and palpitations.   Gastrointestinal:  Negative for abdominal pain, diarrhea, nausea and vomiting.   Genitourinary:  Negative for dysuria and hematuria.   Musculoskeletal:  Negative for back pain, gait problem, neck pain and neck stiffness.   Skin:  Negative for pallor and rash.   Neurological:  Negative for dizziness, syncope, weakness and headaches.   Hematological:  Does not bruise/bleed easily.   Psychiatric/Behavioral:  Negative for confusion and sleep disturbance.      Physical Exam     Initial Vitals [02/21/23 2134]   BP Pulse Resp Temp SpO2   (!) 153/89 94 16 97.8 °F (36.6 °C) 99 %      MAP       --         Physical Exam    Constitutional: He appears well-nourished. He is not diaphoretic. No distress.   Chronically-ill appearing male   HENT:   Head: Normocephalic and atraumatic.   Right Ear: External ear normal.   Left Ear: External ear normal.   Nose: Nose normal.   Mouth/Throat: Oropharynx is clear and moist.   Eyes: Conjunctivae and EOM are normal. Pupils are equal, round, and reactive to light.   Neck: Neck supple.   Normal range of motion.  Cardiovascular:  Normal rate, regular rhythm, normal heart sounds and intact distal pulses.     Exam reveals no friction rub.       No murmur heard.  Occasional skipped beats   Pulmonary/Chest: Breath sounds normal. No respiratory distress. He has no wheezes. He has no rhonchi. He has no rales.   Abdominal: Abdomen is soft. He exhibits no distension. There is no  abdominal tenderness. There is no rebound and no guarding.   Musculoskeletal:         General: Edema (L>R) present.      Cervical back: Normal range of motion and neck supple.     Neurological: He is alert and oriented to person, place, and time. GCS score is 15. GCS eye subscore is 4. GCS verbal subscore is 5. GCS motor subscore is 6.   Skin: Skin is warm and dry. Capillary refill takes less than 2 seconds. No erythema. No pallor.       ED Course   Procedures  Labs Reviewed   CBC W/ AUTO DIFFERENTIAL - Abnormal; Notable for the following components:       Result Value    RBC 4.09 (*)     Hemoglobin 12.1 (*)     Hematocrit 37.3 (*)     RDW 14.7 (*)     Lymph % 16.6 (*)     All other components within normal limits   COMPREHENSIVE METABOLIC PANEL - Abnormal; Notable for the following components:    Sodium 134 (*)     All other components within normal limits   B-TYPE NATRIURETIC PEPTIDE - Abnormal; Notable for the following components:    BNP 1,001 (*)     All other components within normal limits   TROPONIN I HIGH SENSITIVITY - Abnormal; Notable for the following components:    Troponin I High Sensitivity 23.1 (*)     All other components within normal limits   PROTIME-INR   TROPONIN I HIGH SENSITIVITY   TROPONIN I HIGH SENSITIVITY      Patient is a 67-year-old male with past medical history as noted above who presents after defibrillator shock around 8:30 p.m. while patient was trying to go to sleep.  Patient denies any associated symptoms including chest pain, worsening shortness of breath, abdominal pain, lightheadedness, palpitations.  Unclear if patient has been taking medications as prescribed.  Patient mildly hypertensive, other vitals within normal limits.  Exam notable for chronically ill-appearing male, cardiac regular rate and rhythm though occasional skipped beats.  Lungs clear to auscultation bilaterally, abdomen soft, nontender, nondistended.  2+ edema bilaterally but left greater than right.  Concern  for ACS, arrhythmia, electrolyte abnormality, metabolic derangement, anemia, pneumonia, fluid overload.  Could possibly be PE, or viral illness the patient without any systemic symptoms.  Patient with Medtronic defibrillator which was interrogated and call from representative states that patient was in SVT at a rate of 214 for 45 seconds which cause the defibrillator to go off.  They also note that patient has been having intermittent runs of atrial fibrillation since October, some of which greater than 24 hours.  He also had 3 additional episodes of arrhythmias, 2 of which required defibrillation and 1 of which was pace terminated.  Cardiac workup initiated.    Claribel Fitzpatrick MD  Emergency Medicine PGY4  10:23 PM    Update:  Patient's workup significant for BNP elevated at 1000, high sensitivity troponin 23.1, repeat pending.  Patient's chest x-ray looks significant for fluid overload so given a dose of 40 mg IV Lasix.  Patient with chronic T-wave inversions in leads 1, 2, and low lateral leads.  When patient got up to ambulate to the bathroom, he got significantly tachypneic and anxious and started complaining of chest pain.  Due to this, patient to be admitted for high-risk chest pain and CHF exacerbation.     Claribel Fitzpatrick MD  Emergency Medicine PGY4  12:16 AM      'Attending Note:  I provided a face to face evaluation of this patient.  I discussed the patient's care with the Resident.  I reviewed their note and agree with the history, physical, assessment, diagnosis, treatment, all procedures performed, xray and EKG interpretations and admit plan provided by the Resident. My overall impression is SHORTNESS OF BREATH, CHEST PAIN, PALPITATIONS episode of SVT with a defibrillator discharge converting patient back into sinus rhythm with frequent PVCs, anemia, elevated troponin and heart failure exacerbation with mild pulmonary edema  Melissa Hamilton M.D. 2/22/2023 12:10 AM          Imaging Results               X-Ray Chest PA And Lateral (In process)                      Medications   furosemide injection 40 mg (has no administration in time range)   LORazepam injection 0.25 mg (0.25 mg Intravenous Given 2/22/23 0016)                              Clinical Impression:   Final diagnoses:  [R07.9] Chest pain  [I47.1] SVT (supraventricular tachycardia) (Primary)  [Z45.02] Defibrillator discharge  [R06.02] SOB (shortness of breath)  [R07.9] Chest pain, unspecified type  [R00.2] Palpitations        ED Disposition Condition    Admit Stable                Claribel Fitzpatrick MD  Resident  02/22/23 0017

## 2023-02-23 ENCOUNTER — CLINICAL SUPPORT (OUTPATIENT)
Dept: CARDIOLOGY | Facility: HOSPITAL | Age: 68
DRG: 308 | End: 2023-02-23
Attending: INTERNAL MEDICINE
Payer: MEDICARE

## 2023-02-23 LAB
ANION GAP SERPL CALC-SCNC: 9 MMOL/L (ref 8–16)
AORTIC ROOT ANNULUS: 3.7 CM
ASCENDING AORTA: 3.8 CM
AV INDEX (PROSTH): 0.58
AV MEAN GRADIENT: 5 MMHG
AV PEAK GRADIENT: 9 MMHG
AV REGURGITATION PRESSURE HALF TIME: 646 MS
AV VALVE AREA: 2.85 CM2
AV VELOCITY RATIO: 0.57
BSA FOR ECHO PROCEDURE: 2.24 M2
BUN SERPL-MCNC: 12 MG/DL (ref 8–23)
CALCIUM SERPL-MCNC: 8.9 MG/DL (ref 8.7–10.5)
CHLORIDE SERPL-SCNC: 102 MMOL/L (ref 95–110)
CO2 SERPL-SCNC: 24 MMOL/L (ref 23–29)
CREAT SERPL-MCNC: 0.7 MG/DL (ref 0.5–1.4)
CV ECHO LV RWT: 0.42 CM
DOP CALC AO PEAK VEL: 1.52 M/S
DOP CALC AO VTI: 27.9 CM
DOP CALC LVOT AREA: 4.9 CM2
DOP CALC LVOT DIAMETER: 2.5 CM
DOP CALC LVOT PEAK VEL: 0.87 M/S
DOP CALC LVOT STROKE VOLUME: 79.48 CM3
DOP CALC MV VTI: 34.4 CM
DOP CALCLVOT PEAK VEL VTI: 16.2 CM
E WAVE DECELERATION TIME: 148 MSEC
E/A RATIO: 2.95
E/E' RATIO: 15.57 M/S
ECHO LV POSTERIOR WALL: 1.29 CM (ref 0.6–1.1)
EJECTION FRACTION: 34 %
EST. GFR  (NO RACE VARIABLE): >60 ML/MIN/1.73 M^2
FRACTIONAL SHORTENING: 17 % (ref 28–44)
GLUCOSE SERPL-MCNC: 86 MG/DL (ref 70–110)
INTERVENTRICULAR SEPTUM: 1.3 CM (ref 0.6–1.1)
IVC DIAMETER: 2.02 CM
LEFT ATRIUM SIZE: 4.5 CM
LEFT INTERNAL DIMENSION IN SYSTOLE: 5.07 CM (ref 2.1–4)
LEFT VENTRICLE DIASTOLIC VOLUME INDEX: 100.91 ML/M2
LEFT VENTRICLE DIASTOLIC VOLUME: 222 ML
LEFT VENTRICLE MASS INDEX: 162 G/M2
LEFT VENTRICLE SYSTOLIC VOLUME INDEX: 65.5 ML/M2
LEFT VENTRICLE SYSTOLIC VOLUME: 144 ML
LEFT VENTRICULAR INTERNAL DIMENSION IN DIASTOLE: 6.08 CM (ref 3.5–6)
LEFT VENTRICULAR MASS: 355.83 G
LV LATERAL E/E' RATIO: 12.11 M/S
LV SEPTAL E/E' RATIO: 21.8 M/S
LVOT MG: 1 MMHG
LVOT MV: 0.54 CM/S
MR PISA EROA: 0.59 CM2
MV MEAN GRADIENT: 3 MMHG
MV PEAK A VEL: 0.37 M/S
MV PEAK E VEL: 1.09 M/S
MV PEAK GRADIENT: 10 MMHG
MV STENOSIS PRESSURE HALF TIME: 60 MS
MV VALVE AREA BY CONTINUITY EQUATION: 2.31 CM2
MV VALVE AREA P 1/2 METHOD: 3.67 CM2
PISA AR MAX VEL: 3.4 M/S
PISA MRMAX VEL: 4.38 M/S
PISA RADIUS: 1.1 CM
PISA TR MAX VEL: 2.36 M/S
PISA VN NYQUIST MS: 0.34 M/S
PISA VN NYQUIST: 0.34 M/S
POTASSIUM SERPL-SCNC: 3.6 MMOL/L (ref 3.5–5.1)
PV MV: 0.52 M/S
PV PEAK VELOCITY: 0.77 CM/S
RA PRESSURE: 8 MMHG
RV TISSUE DOPPLER FREE WALL SYSTOLIC VELOCITY 1 (APICAL 4 CHAMBER VIEW): 0.01 CM/S
SODIUM SERPL-SCNC: 135 MMOL/L (ref 136–145)
TDI LATERAL: 0.09 M/S
TDI SEPTAL: 0.05 M/S
TDI: 0.07 M/S
TR MAX PG: 22 MMHG
TRICUSPID ANNULAR PLANE SYSTOLIC EXCURSION: 0.96 CM
TV REST PULMONARY ARTERY PRESSURE: 30 MMHG

## 2023-02-23 PROCEDURE — 25000003 PHARM REV CODE 250: Performed by: NURSE PRACTITIONER

## 2023-02-23 PROCEDURE — 63600175 PHARM REV CODE 636 W HCPCS: Performed by: INTERNAL MEDICINE

## 2023-02-23 PROCEDURE — 93306 TTE W/DOPPLER COMPLETE: CPT

## 2023-02-23 PROCEDURE — 63600175 PHARM REV CODE 636 W HCPCS: Mod: TB,JG | Performed by: SPECIALIST

## 2023-02-23 PROCEDURE — 25000003 PHARM REV CODE 250: Performed by: INTERNAL MEDICINE

## 2023-02-23 PROCEDURE — 99232 PR SUBSEQUENT HOSPITAL CARE,LEVL II: ICD-10-PCS | Mod: ,,, | Performed by: SPECIALIST

## 2023-02-23 PROCEDURE — 36415 COLL VENOUS BLD VENIPUNCTURE: CPT | Performed by: INTERNAL MEDICINE

## 2023-02-23 PROCEDURE — 21400001 HC TELEMETRY ROOM

## 2023-02-23 PROCEDURE — 93306 ECHO (CUPID ONLY): ICD-10-PCS | Mod: 26,,, | Performed by: SPECIALIST

## 2023-02-23 PROCEDURE — 93306 TTE W/DOPPLER COMPLETE: CPT | Mod: 26,,, | Performed by: SPECIALIST

## 2023-02-23 PROCEDURE — 25000003 PHARM REV CODE 250: Performed by: SPECIALIST

## 2023-02-23 PROCEDURE — 99232 SBSQ HOSP IP/OBS MODERATE 35: CPT | Mod: ,,, | Performed by: SPECIALIST

## 2023-02-23 PROCEDURE — C9113 INJ PANTOPRAZOLE SODIUM, VIA: HCPCS | Performed by: INTERNAL MEDICINE

## 2023-02-23 PROCEDURE — 80048 BASIC METABOLIC PNL TOTAL CA: CPT | Performed by: INTERNAL MEDICINE

## 2023-02-23 RX ORDER — POTASSIUM CHLORIDE 750 MG/1
10 CAPSULE, EXTENDED RELEASE ORAL DAILY
Status: DISCONTINUED | OUTPATIENT
Start: 2023-02-23 | End: 2023-02-24 | Stop reason: HOSPADM

## 2023-02-23 RX ORDER — FUROSEMIDE 40 MG/1
40 TABLET ORAL DAILY
Status: DISCONTINUED | OUTPATIENT
Start: 2023-02-23 | End: 2023-02-24 | Stop reason: HOSPADM

## 2023-02-23 RX ORDER — AMIODARONE HYDROCHLORIDE 200 MG/1
200 TABLET ORAL 3 TIMES DAILY
Status: DISCONTINUED | OUTPATIENT
Start: 2023-02-23 | End: 2023-02-24 | Stop reason: HOSPADM

## 2023-02-23 RX ADMIN — FUROSEMIDE 40 MG: 10 INJECTION, SOLUTION INTRAMUSCULAR; INTRAVENOUS at 12:02

## 2023-02-23 RX ADMIN — SPIRONOLACTONE 25 MG: 25 TABLET ORAL at 09:02

## 2023-02-23 RX ADMIN — PANTOPRAZOLE SODIUM 40 MG: 40 INJECTION, POWDER, FOR SOLUTION INTRAVENOUS at 09:02

## 2023-02-23 RX ADMIN — PROMETHAZINE HYDROCHLORIDE 12.5 MG: 25 INJECTION INTRAMUSCULAR; INTRAVENOUS at 10:02

## 2023-02-23 RX ADMIN — LORAZEPAM 0.5 MG: 0.5 TABLET ORAL at 09:02

## 2023-02-23 RX ADMIN — POTASSIUM BICARBONATE 20 MEQ: 782 TABLET, EFFERVESCENT ORAL at 08:02

## 2023-02-23 RX ADMIN — AMIODARONE HYDROCHLORIDE 200 MG: 200 TABLET ORAL at 08:02

## 2023-02-23 RX ADMIN — METOPROLOL SUCCINATE 50 MG: 50 TABLET, FILM COATED, EXTENDED RELEASE ORAL at 09:02

## 2023-02-23 RX ADMIN — FUROSEMIDE 40 MG: 40 TABLET ORAL at 04:02

## 2023-02-23 RX ADMIN — LORAZEPAM 0.5 MG: 0.5 TABLET ORAL at 08:02

## 2023-02-23 RX ADMIN — TAMSULOSIN HYDROCHLORIDE 0.4 MG: 0.4 CAPSULE ORAL at 09:02

## 2023-02-23 RX ADMIN — POTASSIUM BICARBONATE 20 MEQ: 782 TABLET, EFFERVESCENT ORAL at 09:02

## 2023-02-23 RX ADMIN — ASPIRIN 81 MG CHEWABLE TABLET 81 MG: 81 TABLET CHEWABLE at 09:02

## 2023-02-23 RX ADMIN — POTASSIUM CHLORIDE 10 MEQ: 750 CAPSULE, EXTENDED RELEASE ORAL at 04:02

## 2023-02-23 RX ADMIN — FUROSEMIDE 40 MG: 10 INJECTION, SOLUTION INTRAMUSCULAR; INTRAVENOUS at 01:02

## 2023-02-23 RX ADMIN — AMIODARONE HYDROCHLORIDE 0.5 MG/MIN: 1.8 INJECTION, SOLUTION INTRAVENOUS at 12:02

## 2023-02-23 RX ADMIN — AMIODARONE HYDROCHLORIDE 200 MG: 200 TABLET ORAL at 04:02

## 2023-02-23 NOTE — PROGRESS NOTES
Formerly [facility], [facility]  Department of Cardiology  Progress Note    PATIENT NAME: Brandon Rivas  MRN: [id number]  TODAY'S DATE: 02/23/2023  ADMIT DATE: 2/21/2023    SUBJECTIVE     PRINCIPLE PROBLEM: Acute systolic heart failure    INTERVAL HISTORY:    2/23/2023  Patient is in NSR.  Trigeminy x 2 this morning nothing sustained.  Currently on amiodarone drip.     Review of patient's allergies indicates:   Allergen Reactions    Bactrim [sulfamethoxazole-trimethoprim] Shortness Of Breath    Statins-hmg-coa reductase inhibitors Other (See Comments)     Statin intolerance. Muscle weakness    Wellbutrin [bupropion hcl] Other (See Comments)     Abdominal pain        REVIEW OF SYSTEMS    Denies CP  Denies SOB  Denies Palpitations.     OBJECTIVE     VITAL SIGNS (Most Recent)  Temp: 97.6 °F (36.4 °C) (02/23/23 0726)  Pulse: 69 (02/23/23 1027)  Resp: 19 (02/23/23 0726)  BP: 107/64 (02/23/23 1027)  SpO2: (!) 92 % (02/23/23 0726)    VENTILATION STATUS  Resp: 19 (02/23/23 0726)  SpO2: (!) 92 % (02/23/23 0726)       I & O (Last 24H):  Intake/Output Summary (Last 24 hours) at 2/23/2023 1138  Last data filed at 2/23/2023 0921  Gross per 24 hour   Intake 100 ml   Output 1100 ml   Net -1000 ml       WEIGHTS  Wt Readings from Last 3 Encounters:   02/23/23 0051 95.3 kg (210 lb)   02/21/23 2134 97.1 kg (214 lb)   01/10/23 1124 101.6 kg (224 lb)   11/07/22 0948 114.7 kg (252 lb 12.1 oz)       PHYSICAL EXAM  CONSTITUTIONAL: Well built, well nourished in no apparent distress  NECK: no carotid bruit, no JVD  LUNGS: CTA  CHEST WALL: no tenderness  HEART: RRR  ABDOMEN: soft, non-tender; bowel sounds normal; no masses,  no organomegaly  EXTREMITIES: Extremities normal, no edema  NEURO: AAO X 3    SCHEDULED MEDS:   aspirin  81 mg Oral Daily    enoxaparin  40 mg Subcutaneous Daily    furosemide (LASIX) injection  40 mg Intravenous Q12H    lisinopriL  20 mg Oral Daily    metoprolol succinate  50 mg Oral Daily    pantoprazole  40 mg Intravenous  Daily    potassium bicarbonate  20 mEq Oral BID    spironolactone  25 mg Oral Daily    tamsulosin  1 capsule Oral Daily       CONTINUOUS INFUSIONS:   amiodarone in dextrose 5% 0.5 mg/min (02/23/23 0044)       PRN MEDS:calcium carbonate, dextrose 10%, dextrose 10%, glucagon (human recombinant), glucose, glucose, LORazepam, naloxone, nitroGLYCERIN, promethazine (PHENERGAN) IVPB, sodium chloride 0.9%    LABS AND DIAGNOSTICS     CBC LAST 3 DAYS  Recent Labs   Lab 02/21/23 2251 02/22/23  0500   WBC 9.05 8.08   RBC 4.09* 4.03*   HGB 12.1* 12.1*   HCT 37.3* 37.2*   MCV 91 92   MCH 29.6 30.0   MCHC 32.4 32.5   RDW 14.7* 14.8*    316   MPV 10.9 11.3   GRAN 70.0  6.3  --    LYMPH 16.6*  1.5  --    MONO 9.4  0.9  --    BASO 0.07  --    NRBC 0  --        COAGULATION LAST 3 DAYS  Recent Labs   Lab 02/21/23 2251   INR 1.0       CHEMISTRY LAST 3 DAYS  Recent Labs   Lab 02/21/23 2251 02/22/23  0500 02/22/23  0837 02/23/23  0420   * 136  --  135*   K 3.6 3.6  --  3.6    105  --  102   CO2 23 24  --  24   ANIONGAP 11 7*  --  9   BUN 10 8  --  12   CREATININE 0.7 0.7  --  0.7   GLU 94 97  --  86   CALCIUM 9.1 8.8  --  8.9   MG  --   --  2.3  --    ALBUMIN 3.7  --   --   --    PROT 7.6  --   --   --    ALKPHOS 78  --   --   --    ALT 22  --   --   --    AST 24  --   --   --    BILITOT 0.4  --   --   --        CARDIAC PROFILE LAST 3 DAYS  Recent Labs   Lab 02/21/23 2251 02/22/23  0007 02/22/23  0500 02/22/23  1156   BNP 1,001*  --   --   --    TROPONINIHS 23.1* 24.9* 22.5* 18.4*       ENDOCRINE LAST 3 DAYS  No results for input(s): TSH, PROCAL in the last 168 hours.    LAST ARTERIAL BLOOD GAS  ABG  No results for input(s): PH, PO2, PCO2, HCO3, BE in the last 168 hours.    LAST 7 DAYS MICROBIOLOGY   Microbiology Results (last 7 days)       ** No results found for the last 168 hours. **            MOST RECENT IMAGING  X-Ray Chest PA And Lateral  EXAM: XR CHEST PA AND LATERAL    HISTORY: Chest Pain    COMPARISON:  Chest x-ray dated May 29, 2019    FINDINGS: A single chamber pacemaker remains in satisfactory position in the left subclavian vein without change. The lungs are well-expanded and are free of focal infiltrates. The pulmonary vasculature appears within normal limits. There are tiny bilateral pleural effusions are new. The heart is borderline enlarged and unchanged. The thoracic aorta is mildly ectatic and tortuous.    IMPRESSION:   Pacemaker in satisfactory position without change since 5/29/2019. Tiny bilateral pleural effusions that are new. Otherwise unremarkable chest x-ray.    Electronically signed by:  Tacos Dalal MD  2/22/2023 8:11 AM [employer] Workstation: [id number]      [employer]  No results found for this or any previous visit.      CURRENT/PREVIOUS VISIT EKG  Results for orders placed or performed during the hospital encounter of 02/21/23   EKG 12-lead    Collection Time: 02/21/23  9:42 PM    Narrative    Test Reason : R07.9,    Vent. Rate : 088 BPM     Atrial Rate : 088 BPM     P-R Int : 160 ms          QRS Dur : 110 ms      QT Int : 390 ms       P-R-T Axes : 077 002 152 degrees     QTc Int : 471 ms    Sinus rhythm with occasional Premature ventricular complexes and Premature  atrial complexes  Septal infarct (cited on or before 18-DEC-2016)  Abnormal ECG  When compared with ECG of 19-DEC-2016 13:02,  Premature ventricular complexes are now Present  Premature atrial complexes are now Present  T wave inversion no longer evident in Inferior leads  T wave inversion no longer evident in Anterior leads    Referred By: [email or web address]   SELF           Confirmed By:        ASSESSMENT/PLAN:     Active Hospital Problems    Diagnosis    *Acute systolic heart failure    appropriate shocks from ICD (implantable cardioverter-defibrillator)    Thyroid disease    Kidney stone    Adrenal nodule    Ventricular tachycardia    Other emphysema    Acute ischemic right MCA stroke    Alcohol dependence    Coronary artery disease with  unstable angina pectoris    Hyperlipidemia    PVD (peripheral vascular disease)       ASSESSMENT & PLAN:     AICD Shock-Medtronic VVI Single chamber  Hypokalemia  H/O PAF? Currently SR  CAD H/O  MI and CABG  Chronic abdominal Pain  6. H/O CVA    RECOMMENDATIONS:    Continue to diurese could possible change to PO tomorrow  Recommend to keep K at least 4 and Mag at least 2.0  Change Amiodarone drip to  mg PO TID x 7 days then BID thereafter  Continue Metoprolol 50 mg daily  If he continues having episodes he will need an atrial lead for the VVI defibrillator      Nicolle Espino NP  [facility]  Department of Cardiology  Date of Service: 02/23/2023

## 2023-02-24 VITALS
WEIGHT: 208 LBS | SYSTOLIC BLOOD PRESSURE: 107 MMHG | HEART RATE: 74 BPM | OXYGEN SATURATION: 97 % | RESPIRATION RATE: 16 BRPM | HEIGHT: 73 IN | DIASTOLIC BLOOD PRESSURE: 86 MMHG | BODY MASS INDEX: 27.57 KG/M2 | TEMPERATURE: 97 F

## 2023-02-24 PROBLEM — Z86.79 HISTORY OF VENTRICULAR TACHYCARDIA: Status: ACTIVE | Noted: 2023-02-24

## 2023-02-24 LAB
ANION GAP SERPL CALC-SCNC: 7 MMOL/L (ref 8–16)
BUN SERPL-MCNC: 17 MG/DL (ref 8–23)
CALCIUM SERPL-MCNC: 9.1 MG/DL (ref 8.7–10.5)
CHLORIDE SERPL-SCNC: 105 MMOL/L (ref 95–110)
CO2 SERPL-SCNC: 27 MMOL/L (ref 23–29)
CREAT SERPL-MCNC: 0.8 MG/DL (ref 0.5–1.4)
EST. GFR  (NO RACE VARIABLE): >60 ML/MIN/1.73 M^2
GLUCOSE SERPL-MCNC: 96 MG/DL (ref 70–110)
POTASSIUM SERPL-SCNC: 3.9 MMOL/L (ref 3.5–5.1)
SODIUM SERPL-SCNC: 139 MMOL/L (ref 136–145)

## 2023-02-24 PROCEDURE — 25000003 PHARM REV CODE 250: Performed by: INTERNAL MEDICINE

## 2023-02-24 PROCEDURE — 25000003 PHARM REV CODE 250: Performed by: SPECIALIST

## 2023-02-24 PROCEDURE — C9113 INJ PANTOPRAZOLE SODIUM, VIA: HCPCS | Performed by: INTERNAL MEDICINE

## 2023-02-24 PROCEDURE — 25000003 PHARM REV CODE 250: Performed by: NURSE PRACTITIONER

## 2023-02-24 PROCEDURE — 80048 BASIC METABOLIC PNL TOTAL CA: CPT | Performed by: INTERNAL MEDICINE

## 2023-02-24 PROCEDURE — 36415 COLL VENOUS BLD VENIPUNCTURE: CPT | Performed by: INTERNAL MEDICINE

## 2023-02-24 PROCEDURE — 63600175 PHARM REV CODE 636 W HCPCS: Performed by: INTERNAL MEDICINE

## 2023-02-24 RX ORDER — METOPROLOL SUCCINATE 50 MG/1
50 TABLET, EXTENDED RELEASE ORAL DAILY
Qty: 90 TABLET | Refills: 3 | Status: ON HOLD | OUTPATIENT
Start: 2023-02-24 | End: 2023-04-25 | Stop reason: HOSPADM

## 2023-02-24 RX ORDER — ONDANSETRON 4 MG/1
4 TABLET, FILM COATED ORAL EVERY 8 HOURS PRN
Qty: 15 TABLET | Refills: 0 | Status: SHIPPED | OUTPATIENT
Start: 2023-02-24 | End: 2023-03-07 | Stop reason: SDUPTHER

## 2023-02-24 RX ORDER — AMIODARONE HYDROCHLORIDE 200 MG/1
TABLET ORAL
Qty: 192 TABLET | Refills: 0 | Status: ON HOLD | OUTPATIENT
Start: 2023-02-24 | End: 2023-04-16 | Stop reason: HOSPADM

## 2023-02-24 RX ORDER — LISINOPRIL 20 MG/1
20 TABLET ORAL DAILY
Qty: 90 TABLET | Refills: 3 | Status: ON HOLD | OUTPATIENT
Start: 2023-02-24 | End: 2023-04-25 | Stop reason: HOSPADM

## 2023-02-24 RX ORDER — LORAZEPAM 0.5 MG/1
0.5 TABLET ORAL EVERY 8 HOURS PRN
Qty: 20 TABLET | Refills: 0 | Status: SHIPPED | OUTPATIENT
Start: 2023-02-24 | End: 2023-03-07 | Stop reason: SDUPTHER

## 2023-02-24 RX ORDER — SPIRONOLACTONE 25 MG/1
25 TABLET ORAL DAILY
Qty: 90 TABLET | Refills: 3 | Status: SHIPPED | OUTPATIENT
Start: 2023-02-25 | End: 2024-02-25

## 2023-02-24 RX ORDER — FUROSEMIDE 40 MG/1
40 TABLET ORAL DAILY
Qty: 90 TABLET | Refills: 3 | Status: SHIPPED | OUTPATIENT
Start: 2023-02-24 | End: 2024-02-24

## 2023-02-24 RX ORDER — POTASSIUM CHLORIDE 750 MG/1
10 TABLET, EXTENDED RELEASE ORAL EVERY OTHER DAY
Qty: 45 TABLET | Refills: 3 | Status: ON HOLD | OUTPATIENT
Start: 2023-02-24 | End: 2023-04-26 | Stop reason: SDUPTHER

## 2023-02-24 RX ADMIN — POTASSIUM CHLORIDE 10 MEQ: 750 CAPSULE, EXTENDED RELEASE ORAL at 08:02

## 2023-02-24 RX ADMIN — METOPROLOL SUCCINATE 50 MG: 50 TABLET, FILM COATED, EXTENDED RELEASE ORAL at 08:02

## 2023-02-24 RX ADMIN — AMIODARONE HYDROCHLORIDE 200 MG: 200 TABLET ORAL at 08:02

## 2023-02-24 RX ADMIN — SPIRONOLACTONE 25 MG: 25 TABLET ORAL at 08:02

## 2023-02-24 RX ADMIN — POTASSIUM BICARBONATE 20 MEQ: 782 TABLET, EFFERVESCENT ORAL at 09:02

## 2023-02-24 RX ADMIN — TAMSULOSIN HYDROCHLORIDE 0.4 MG: 0.4 CAPSULE ORAL at 08:02

## 2023-02-24 RX ADMIN — PANTOPRAZOLE SODIUM 40 MG: 40 INJECTION, POWDER, FOR SOLUTION INTRAVENOUS at 08:02

## 2023-02-24 RX ADMIN — LORAZEPAM 0.5 MG: 0.5 TABLET ORAL at 08:02

## 2023-02-24 RX ADMIN — FUROSEMIDE 40 MG: 40 TABLET ORAL at 08:02

## 2023-02-24 RX ADMIN — ASPIRIN 81 MG CHEWABLE TABLET 81 MG: 81 TABLET CHEWABLE at 08:02

## 2023-02-24 NOTE — PROGRESS NOTES
"[facility] Medicine Progress Note  Patient Name: Brandon Rivas MRN: [id number]   Patient Class: IP- Inpatient  Length of Stay: 1   Admission Date: 2/21/2023  9:40 PM Attending Physician: Jesús Heller MD   Primary Care Provider: James Garcia MD Face-to-Face encounter date: 02/23/2023   Chief Complaint: Pacemaker Problem (Pt states he thinks his defibrillator went off.  Pt states it woke him from sleep.  Medtronic pacemaker/defibrillator)      Subjective:    Interval History   7 beat run of VT last night - asymptomatic  No further SOB reported. B/l LE edema is better   Denies chest pain,  palpitations, abdominal pain  No concerns/issues overnight reported by the patient or the nursing staff.  Reviewed the labs and discussed the plan of care.   No family present at bedside.     Review of Systems   All other Review of Systems were found to be negative expect for that mentioned already in HPI.     Hospital Course     02/23/2023     amiodarone  200 mg Oral TID    aspirin  81 mg Oral Daily    enoxaparin  40 mg Subcutaneous Daily    furosemide  40 mg Oral Daily    lisinopriL  20 mg Oral Daily    metoprolol succinate  50 mg Oral Daily    pantoprazole  40 mg Intravenous Daily    potassium bicarbonate  20 mEq Oral BID    potassium chloride  10 mEq Oral Daily    spironolactone  25 mg Oral Daily    tamsulosin  1 capsule Oral Daily       calcium carbonate, dextrose 10%, dextrose 10%, glucagon (human recombinant), glucose, glucose, LORazepam, naloxone, nitroGLYCERIN, promethazine (PHENERGAN) IVPB, sodium chloride 0.9%      Objective:   Physical Exam  /63 (BP Location: Right arm, Patient Position: Sitting)   Pulse 70   Temp 97.9 °F (36.6 °C) (Oral)   Resp 16   Ht 6' 1" (1.854 m)   Wt 95.3 kg (210 lb)   SpO2 (!) 94%   BMI 27.71 kg/m²   Constitutional: No distress.   HENT: Atraumatic.   Cardiovascular: Normal rate, regular rhythm and normal heart sounds.   Pulmonary/Chest: " Effort normal. Clear to auscultation bilaterally. No wheezes.   Abdominal: Soft. Bowel sounds are normal. Exhibits no distension and no mass. No tenderness  Neurological: Alert.   Skin: Skin is warm and dry.     Labs and Imaging    Significant Labs: All pertinent labs within the past 24 hours have been reviewed.    Significant Imaging: I have reviewed all pertinent imaging results/findings within the past 24 hours.    I have reviewed the Vitals, labs and imaging as above.     Assessment & Plan:   Brandon Rivas is a 67 y.o. male admitted for    Active Hospital Problems    Diagnosis    *Acute combined systolic and diastolic heart failure    Thyroid disease    Adrenal nodule    Other emphysema    Alcohol dependence    Coronary artery disease with unstable angina pectoris    Hyperlipidemia    PVD (peripheral vascular disease)       Plan  AICD shock triggered by SVT   Started on amiodarone - drip --> PO   Replace electrolytes per protocol   Echo with EF of 34% and grade 3 DD  Cardiology following   Switched to PO diuretics   Phenergan ordered for nausea    Active PT: No  Active OT: No  Active SLP: No    Core measures:  - Code status:   - Diet: Cardiac  VTE Risk Mitigation (From admission, onward)           Ordered     enoxaparin injection 40 mg  Daily         02/22/23 0040     IP VTE HIGH RISK PATIENT  Once         02/22/23 0040     Place sequential compression device  Until discontinued         02/22/23 0040                    Discharge Planning:   Discharge Planning   LARY: 02/24    Code Status: Full Code   Is the patient medically ready for discharge?: no    Reason for patient still in hospital (select all that apply): Patient trending condition  Discharge Plan A: Home with assistance from family        Above encounter included review of the medical records, interviewing and examining the patient face-to-face, discussion with family and other health care providers, ordering and interpreting lab/test results and  formulating a plan of care.     Medical Decision Making:  [] Low Complexity  [x] Moderate Complexity  [] High Complexity    Jesús Heller MD  [facility] Hospitalist  02/23/2023

## 2023-02-24 NOTE — PLAN OF CARE
Home health arranged with [facility].  Per [employer] response, patient will admit to their services on 2/25/23.  [facility] [address] [place], [place] provided via [employer].     Discharge orders and chart reviewed with no further post-acute discharge needs identified at this time.  At this time, patient is cleared for discharge from Case Management standpoint.        02/24/23 1322   Final Note   Assessment Type Final Discharge Note   Anticipated Discharge Disposition Home-Health   Hospital Resources/Appts/Education Provided Appointments scheduled and added to AVS;Post-Acute resouces added to AVS   Post-Acute Status   Post-Acute Authorization Home Health   Home Health Status Set-up Complete/Auth obtained   Discharge Delays None known at this time

## 2023-02-24 NOTE — PLAN OF CARE
Problem: Adult Inpatient Plan of Care  Goal: Plan of Care Review  2/24/2023 1315 by Dee Paredes LPN  Outcome: Met  2/24/2023 1130 by Dee Paredes LPN  Outcome: Ongoing, Progressing  Goal: Patient-Specific Goal (Individualized)  2/24/2023 1315 by Dee Paredes LPN  Outcome: Met  2/24/2023 1130 by Dee Paredes LPN  Outcome: Ongoing, Progressing  Goal: Absence of Hospital-Acquired Illness or Injury  2/24/2023 1315 by Dee Paredes LPN  Outcome: Met  2/24/2023 1130 by Dee Paredes LPN  Outcome: Ongoing, Progressing  Goal: Optimal Comfort and Wellbeing  2/24/2023 1315 by eDe Paredes LPN  Outcome: Met  2/24/2023 1130 by Dee Paredes LPN  Outcome: Ongoing, Progressing  Goal: Readiness for Transition of Care  2/24/2023 1315 by Dee Paredes LPN  Outcome: Met  2/24/2023 1130 by Dee Paredes LPN  Outcome: Ongoing, Progressing

## 2023-02-24 NOTE — PLAN OF CARE
Spoke with patient concerning receiving home health services at discharge to which he verbalizes agreement and understanding.  Discussed agency options from Medicare ratings list and Patient Choice Form signed reflecting [facility] as preferred agency.  Referral sent via [employer], awaiting response.        02/24/23 1205   Post-Acute Status   Post-Acute Authorization Home Health   Home Health Status Referrals Sent   Discharge Plan   Discharge Plan A Home Health

## 2023-02-24 NOTE — PROGRESS NOTES
[facility]  Department of Cardiology  Progress Note    PATIENT NAME: Brandon Rivas  MRN: [id number]  TODAY'S DATE: 02/24/2023  ADMIT DATE: 2/21/2023    SUBJECTIVE     PRINCIPLE PROBLEM: Acute combined systolic and diastolic heart failure    INTERVAL HISTORY:    2/24/2023    11 BT run of VT this morning asymptomatic    2/23/2023  Patient is in NSR.  Trigeminy x 2 this morning nothing sustained.  Currently on amiodarone drip.     Review of patient's allergies indicates:   Allergen Reactions    Bactrim [sulfamethoxazole-trimethoprim] Shortness Of Breath    Statins-hmg-coa reductase inhibitors Other (See Comments)     Statin intolerance. Muscle weakness    Wellbutrin [bupropion hcl] Other (See Comments)     Abdominal pain        REVIEW OF SYSTEMS    Denies CP  Denies SOB  Denies Palpitations.     OBJECTIVE     VITAL SIGNS (Most Recent)  Temp: 98 °F (36.7 °C) (02/24/23 0310)  Pulse: 73 (02/24/23 0310)  Resp: 18 (02/24/23 0310)  BP: 113/71 (02/24/23 0310)  SpO2: 95 % (02/24/23 0310)    VENTILATION STATUS  Resp: 18 (02/24/23 0310)  SpO2: 95 % (02/24/23 0310)       I & O (Last 24H):  Intake/Output Summary (Last 24 hours) at 2/24/2023 1122  Last data filed at 2/24/2023 1002  Gross per 24 hour   Intake 240 ml   Output 600 ml   Net -360 ml       WEIGHTS  Wt Readings from Last 3 Encounters:   02/24/23 0310 94.3 kg (208 lb)   02/23/23 0051 95.3 kg (210 lb)   02/21/23 2134 97.1 kg (214 lb)   01/10/23 1124 101.6 kg (224 lb)   11/07/22 0948 114.7 kg (252 lb 12.1 oz)       PHYSICAL EXAM  CONSTITUTIONAL: Well built, well nourished in no apparent distress  NECK: no carotid bruit, no JVD  LUNGS: CTA  CHEST WALL: no tenderness  HEART: RRR  ABDOMEN: soft, non-tender; bowel sounds normal; no masses,  no organomegaly  EXTREMITIES: Extremities normal, no edema  NEURO: AAO X 3    SCHEDULED MEDS:   amiodarone  200 mg Oral TID    aspirin  81 mg Oral Daily    enoxaparin  40 mg Subcutaneous Daily    furosemide  40 mg Oral Daily     lisinopriL  20 mg Oral Daily    metoprolol succinate  50 mg Oral Daily    pantoprazole  40 mg Intravenous Daily    potassium bicarbonate  20 mEq Oral BID    potassium chloride  10 mEq Oral Daily    spironolactone  25 mg Oral Daily    tamsulosin  1 capsule Oral Daily       CONTINUOUS INFUSIONS:        PRN MEDS:calcium carbonate, dextrose 10%, dextrose 10%, glucagon (human recombinant), glucose, glucose, LORazepam, naloxone, nitroGLYCERIN, promethazine (PHENERGAN) IVPB, sodium chloride 0.9%    LABS AND DIAGNOSTICS     CBC LAST 3 DAYS  Recent Labs   Lab 02/21/23 2251 02/22/23  0500   WBC 9.05 8.08   RBC 4.09* 4.03*   HGB 12.1* 12.1*   HCT 37.3* 37.2*   MCV 91 92   MCH 29.6 30.0   MCHC 32.4 32.5   RDW 14.7* 14.8*    316   MPV 10.9 11.3   GRAN 70.0  6.3  --    LYMPH 16.6*  1.5  --    MONO 9.4  0.9  --    BASO 0.07  --    NRBC 0  --        COAGULATION LAST 3 DAYS  Recent Labs   Lab 02/21/23 2251   INR 1.0       CHEMISTRY LAST 3 DAYS  Recent Labs   Lab 02/21/23 2251 02/22/23  0500 02/22/23  0837 02/23/23  0420 02/24/23  0448   * 136  --  135* 139   K 3.6 3.6  --  3.6 3.9    105  --  102 105   CO2 23 24  --  24 27   ANIONGAP 11 7*  --  9 7*   BUN 10 8  --  12 17   CREATININE 0.7 0.7  --  0.7 0.8   GLU 94 97  --  86 96   CALCIUM 9.1 8.8  --  8.9 9.1   MG  --   --  2.3  --   --    ALBUMIN 3.7  --   --   --   --    PROT 7.6  --   --   --   --    ALKPHOS 78  --   --   --   --    ALT 22  --   --   --   --    AST 24  --   --   --   --    BILITOT 0.4  --   --   --   --        CARDIAC PROFILE LAST 3 DAYS  Recent Labs   Lab 02/21/23 2251 02/22/23  0007 02/22/23  0500 02/22/23  1156   BNP 1,001*  --   --   --    TROPONINIHS 23.1* 24.9* 22.5* 18.4*       ENDOCRINE LAST 3 DAYS  No results for input(s): TSH, PROCAL in the last 168 hours.    LAST ARTERIAL BLOOD GAS  ABG  No results for input(s): PH, PO2, PCO2, HCO3, BE in the last 168 hours.    LAST 7 DAYS MICROBIOLOGY   Microbiology Results (last 7 days)        ** No results found for the last 168 hours. **            MOST RECENT IMAGING  Echo  · The left ventricle is mildly enlarged with moderate concentric   hypertrophy evidence of old apical scar seen on apical four-chamber view  · The estimated ejection fraction is 34%.  · Grade III left ventricular diastolic dysfunction. Mean left atrial   pressure 11  · There are segmental left ventricular wall motion abnormalities.  · Atrial fibrillation not observed.  · Normal right ventricular size with mildly reduced right ventricular   systolic function.  · Mild-to-moderate aortic regurgitation.  · Severe mitral regurgitation.  · Intermediate central venous pressure (8 mmHg).  · The estimated PA systolic pressure is 30 mmHg. Mild pulmonary   hypertension is present     Patient has sinus rhythm      LASTECHO  No results found for this or any previous visit.      CURRENT/PREVIOUS VISIT EKG  Results for orders placed or performed during the hospital encounter of 02/21/23   EKG 12-lead    Collection Time: 02/21/23  9:42 PM    Narrative    Test Reason : R07.9,    Vent. Rate : 088 BPM     Atrial Rate : 088 BPM     P-R Int : 160 ms          QRS Dur : 110 ms      QT Int : 390 ms       P-R-T Axes : 077 002 152 degrees     QTc Int : 471 ms    Sinus rhythm with occasional Premature ventricular complexes and Premature  atrial complexes  Septal infarct (cited on or before 18-DEC-2016)  Abnormal ECG  When compared with ECG of 19-DEC-2016 13:02,  Premature ventricular complexes are now Present  Premature atrial complexes are now Present  T wave inversion no longer evident in Inferior leads  T wave inversion no longer evident in Anterior leads  Confirmed by Sohan HERNÁNDEZ, Erlin MANZANARES ([id number]) on 2/23/2023 7:11:58 PM    Referred By: [email or web address]   SELF           Confirmed By:Erlin Parry MD       ASSESSMENT/PLAN:     Active Hospital Problems    Diagnosis    *Acute combined systolic and diastolic heart failure    History of ventricular tachycardia     Thyroid disease    Adrenal nodule    Other emphysema    Alcohol dependence    Coronary artery disease with unstable angina pectoris    Hyperlipidemia    PVD (peripheral vascular disease)       ASSESSMENT & PLAN:     AICD Shock-[employer] VVI Single chamber  Hypokalemia  H/O PAF? Currently SR  CAD H/O  MI and CABG  Chronic abdominal Pain  6. H/O CVA    RECOMMENDATIONS:    Per Dr. Parry Recommendations yesterday  Rx amiodarone 400 b.i.d. for 3 days and 200 b.i.d., sent home on potassium 10 mEq q.o.d. metoprolol 50 day and lisinopril 20 a day  Mag ox daily  Follow-up with a cardiologist in [place]   BNP level in picking in next week      Nicolle Espino NP  [facility]  Department of Cardiology  Date of Service: 02/24/2023

## 2023-02-27 NOTE — HOSPITAL COURSE
Patient is a 67-year-old  male with previous history of CAD status post stenting, hyperlipidemia, essential hypertension, alcohol use disorder and chronic combined CHF status post AICD who presented to the ED after suffering AICD shock.    Subsequent interrogation revealed AICD shock triggered by SVT.  Seen by Cardiology.  Echocardiogram revealed LVEF of 34% and grade 3 diastolic dysfunction.  Treated with diuretics for possible component of CHF exacerbation.  He was initiated on amiodarone as well as spironolactone as outlined below.  Mexiletine has been discontinued.  Advised follow up with Cardiology.    I have seen the patient on the day of discharge and reviewed the discharge instructions as outlined below. Patient verbalized understanding and is aware to contact primary care physician or return to ED if new or worsening symptoms.

## 2023-02-27 NOTE — DISCHARGE SUMMARY
[facility] Medicine  Discharge Summary      Patient Name: Brandon Rivas  MRN: [id number]  OPAL: [id number]  Patient Class: IP- Inpatient  Admission Date: 2/21/2023  Hospital Length of Stay: 2 days  Discharge Date and Time: 2/24/2023  2:13 PM  Attending Physician: No att. providers found   Discharging Provider: Jesús Heller MD  Primary Care Provider: James Garcia MD    Primary Care Team: Networked reference to record PCT       Hospital Course:   Patient is a 67-year-old  male with previous history of CAD status post stenting, hyperlipidemia, essential hypertension, alcohol use disorder and chronic combined CHF status post AICD who presented to the ED after suffering AICD shock.    Subsequent interrogation revealed AICD shock triggered by SVT.  Seen by Cardiology.  Echocardiogram revealed LVEF of 34% and grade 3 diastolic dysfunction.  Treated with diuretics for possible component of CHF exacerbation.  He was initiated on amiodarone as well as spironolactone as outlined below.  Mexiletine has been discontinued.  Advised follow up with Cardiology.    I have seen the patient on the day of discharge and reviewed the discharge instructions as outlined below. Patient verbalized understanding and is aware to contact primary care physician or return to ED if new or worsening symptoms.         Goals of Care Treatment Preferences:  Code Status: Full Code      Consults:     No notes have been filed under this hospital service.  Service: Hospital Medicine    Final Active Diagnoses:    Diagnosis Date Noted POA    PRINCIPAL PROBLEM:  Acute combined systolic and diastolic heart failure [I50.41] 12/18/2016 Yes    History of ventricular tachycardia [Z86.79] 02/24/2023 Not Applicable    Thyroid disease [E07.9]  Yes    Adrenal nodule [E27.8]  Yes    Other emphysema [J43.8] 03/24/2022 Yes    Alcohol dependence [F10.20] 12/18/2016 Yes    Coronary artery disease with unstable  angina pectoris [I25.110] 12/18/2016 Yes    Hyperlipidemia [E78.5] 12/18/2016 Yes    PVD (peripheral vascular disease) [I73.9] 12/18/2016 Yes      Problems Resolved During this Admission:       Discharged Condition: stable    Disposition: Home-Health Care [facility]    Follow Up:   Follow-up Information     James Garcia MD. Schedule an appointment as soon as possible for a visit in 2 week(s).    Specialties: Internal Medicine, Hospice Services, Home Health Services  Why: Hospital discharge follow-up  Contact information:  [address]  [place] [address]  [phone]             Erlin Parry MD. Schedule an appointment as soon as possible for a visit in 4 week(s).    Specialties: Cardiology, Cardiovascular Disease  Why: Cardiology follow-up  Contact information:  [address]  [address]  [place] [address]  [phone]             [facility] Follow up on 2/25/2023.    Specialties: Home Health Services, Home Therapy Services, Home Living Aide Services  Why: Agency will call to arrange best time  Contact information:  [address]  [place] [address]  [phone]                     Patient Instructions:      Ambulatory referral/consult to Home Health   Standing Status: Future   Referral Priority: Routine Referral Type: Home Health   Referral Reason: Specialty Services Required   Requested Specialty: Home Health Services   Number of Visits Requested: 1     Diet Cardiac     Notify your health care provider if you experience any of the following:  persistent nausea and vomiting or diarrhea     Notify your health care provider if you experience any of the following:  severe uncontrolled pain     Notify your health care provider if you experience any of the following:  difficulty breathing or increased cough     Notify your health care provider if you experience any of the following:  persistent dizziness, light-headedness, or visual disturbances     Notify your health  care provider if you experience any of the following:  increased confusion or weakness     Notify your health care provider if you experience any of the following:   Order Comments: Worsening or recurrent symptoms     Activity as tolerated       Significant Diagnostic Studies: Cardiac Graphics: Echocardiogram:   Transthoracic echo (TTE) complete (Cupid Only):   Results for orders placed or performed during the hospital encounter of 02/21/23   Echo   Result Value Ref Range    BSA 2.24 m2    TDI SEPTAL 0.05 m/s    LV LATERAL E/E' RATIO 12.11 m/s    LV SEPTAL E/E' RATIO 21.80 m/s    IVC diameter 2.02 cm    Left Ventricular Outflow Tract Mean Velocity 0.54 cm/s    Left Ventricular Outflow Tract Mean Gradient 1.00 mmHg    Pulmonary Valve Mean Velocity 0.52 m/s    TDI LATERAL 0.09 m/s    PV PEAK VELOCITY 0.77 cm/s    LVIDd 6.08 (A) 3.5 - 6.0 cm    IVS 1.30 (A) 0.6 - 1.1 cm    Posterior Wall 1.29 (A) 0.6 - 1.1 cm    Ao root annulus 3.70 cm    LVIDs 5.07 (A) 2.1 - 4.0 cm    FS 17 28 - 44 %    Ascending aorta 3.80 cm    LV mass 355.83 g    LA size 4.50 cm    TAPSE 0.96 cm    RV S' 0.01 cm/s    Left Ventricle Relative Wall Thickness 0.42 cm    AV regurgitation pressure 1/2 time 646 ms    AV mean gradient 5 mmHg    AV valve area 2.85 cm2    AV Velocity Ratio 0.57     AV index (prosthetic) 0.58     MV mean gradient 3 mmHg    MV valve area p 1/2 method 3.67 cm2    MV valve area by continuity eq 2.31 cm2    E/A ratio 2.95     Mean e' 0.07 m/s    E wave deceleration time 148.00 msec    LVOT diameter 2.50 cm    LVOT area 4.9 cm2    LVOT peak benny 0.87 m/s    LVOT peak VTI 16.20 cm    Ao peak benny 1.52 m/s    Ao VTI 27.9 cm    Vn Nyquist 0.34 m/s    Radius 1.10 cm    Mr max benny 4.38 m/s    LVOT stroke volume 79.48 cm3    AV peak gradient 9 mmHg    MV peak gradient 10 mmHg    E/E' ratio 15.57 m/s    Vn Nyquist MS 0.34 m/s    MV Peak E Benny 1.09 m/s    MR PISA EROA 0.59 cm2    AR Max Benny 3.40 m/s    TR Max Benny 2.36 m/s    MV VTI 34.4 cm     MV stenosis pressure 1/2 time 60.00 ms    MV Peak A Benny 0.37 m/s    LV Systolic Volume 144.00 mL    LV Systolic Volume Index 65.5 mL/m2    LV Diastolic Volume 222.00 mL    LV Diastolic Volume Index 100.91 mL/m2    LV Mass Index 162 g/m2    Triscuspid Valve Regurgitation Peak Gradient 22 mmHg    Right Atrial Pressure (from IVC) 8 mmHg    EF 34 %    TV rest pulmonary artery pressure 30 mmHg    Narrative    · The left ventricle is mildly enlarged with moderate concentric   hypertrophy evidence of old apical scar seen on apical four-chamber view  · The estimated ejection fraction is 34%.  · Grade III left ventricular diastolic dysfunction. Mean left atrial   pressure 11  · There are segmental left ventricular wall motion abnormalities.  · Atrial fibrillation not observed.  · Normal right ventricular size with mildly reduced right ventricular   systolic function.  · Mild-to-moderate aortic regurgitation.  · Severe mitral regurgitation.  · Intermediate central venous pressure (8 mmHg).  · The estimated PA systolic pressure is 30 mmHg. Mild pulmonary   hypertension is present     Patient has sinus rhythm         Pending Diagnostic Studies:     None         Medications:  Reconciled Home Medications:      Medication List      START taking these medications    amiodarone 200 MG Tab  Commonly known as: PACERONE  Take 2 tablets (400 mg total) by mouth 2 (two) times daily for 3 days, THEN 1 tablet (200 mg total) 2 (two) times daily.  Start taking on: February 24, 2023     LORazepam 0.5 MG tablet  Commonly known as: ATIVAN  Take 1 tablet (0.5 mg total) by mouth every 8 (eight) hours as needed for Anxiety.     ondansetron 4 MG tablet  Commonly known as: ZOFRAN  Take 1 tablet (4 mg total) by mouth every 8 (eight) hours as needed for Nausea.     spironolactone 25 MG tablet  Commonly known as: ALDACTONE  Take 1 tablet (25 mg total) by mouth once daily.        CHANGE how you take these medications    metoprolol succinate 50 MG 24  hr tablet  Commonly known as: TOPROL-XL  Take 1 tablet (50 mg total) by mouth once daily.  What changed:   · medication strength  · how much to take     potassium chloride 10 MEQ Tbsr  Commonly known as: KLOR-CON  Take 1 tablet (10 mEq total) by mouth every other day.  What changed: when to take this        CONTINUE taking these medications    aspirin 81 MG Chew  Take 1 tablet (81 mg total) by mouth once daily.     furosemide 40 MG tablet  Commonly known as: LASIX  Take 1 tablet (40 mg total) by mouth once daily.     lisinopriL 20 MG tablet  Commonly known as: PRINIVIL,ZESTRIL  Take 1 tablet (20 mg total) by mouth once daily.     nitroGLYCERIN 0.4 MG SL tablet  Commonly known as: NITROSTAT  Place 0.4 mg under the tongue every 5 (five) minutes as needed.     pantoprazole 40 MG tablet  Commonly known as: PROTONIX  Take 40 mg by mouth once daily.     tamsulosin 0.4 mg Cap  Commonly known as: FLOMAX  Take 1 capsule by mouth once daily.        STOP taking these medications    cephALEXin 500 MG capsule  Commonly known as: KEFLEX     hydrOXYzine HCL 25 MG tablet  Commonly known as: ATARAX     MULTIVITAMIN ORAL            Time spent on the discharge of patient: 40 minutes         Jesús Heller MD  Department of Hospital Medicine  [facility]

## 2023-02-28 ENCOUNTER — LAB VISIT (OUTPATIENT)
Dept: FAMILY MEDICINE | Facility: CLINIC | Age: 68
End: 2023-02-28
Payer: MEDICARE

## 2023-02-28 ENCOUNTER — TELEPHONE (OUTPATIENT)
Dept: FAMILY MEDICINE | Facility: CLINIC | Age: 68
End: 2023-02-28
Payer: MEDICARE

## 2023-02-28 DIAGNOSIS — E87.6 HYPOKALEMIA: ICD-10-CM

## 2023-02-28 LAB
ANION GAP SERPL CALC-SCNC: 10 MMOL/L (ref 8–16)
BUN SERPL-MCNC: 15 MG/DL (ref 8–23)
CALCIUM SERPL-MCNC: 9.6 MG/DL (ref 8.7–10.5)
CHLORIDE SERPL-SCNC: 105 MMOL/L (ref 95–110)
CO2 SERPL-SCNC: 25 MMOL/L (ref 23–29)
CREAT SERPL-MCNC: 0.8 MG/DL (ref 0.5–1.4)
EST. GFR  (NO RACE VARIABLE): >60 ML/MIN/1.73 M^2
GLUCOSE SERPL-MCNC: 109 MG/DL (ref 70–110)
POTASSIUM SERPL-SCNC: 4.8 MMOL/L (ref 3.5–5.1)
SODIUM SERPL-SCNC: 140 MMOL/L (ref 136–145)

## 2023-02-28 PROCEDURE — 80048 BASIC METABOLIC PNL TOTAL CA: CPT | Performed by: INTERNAL MEDICINE

## 2023-02-28 NOTE — TELEPHONE ENCOUNTER
Called and spoke to patient to inquire if he could come in earlier today for his non fasting. Lab verbalized understanding and reports that he will come in today around 10 AM for lab. CHANTEL Wong

## 2023-03-07 ENCOUNTER — OFFICE VISIT (OUTPATIENT)
Dept: FAMILY MEDICINE | Facility: CLINIC | Age: 68
End: 2023-03-07
Payer: MEDICARE

## 2023-03-07 VITALS
OXYGEN SATURATION: 96 % | HEART RATE: 77 BPM | HEIGHT: 73 IN | RESPIRATION RATE: 18 BRPM | DIASTOLIC BLOOD PRESSURE: 78 MMHG | SYSTOLIC BLOOD PRESSURE: 124 MMHG | BODY MASS INDEX: 27.7 KG/M2 | WEIGHT: 209 LBS

## 2023-03-07 DIAGNOSIS — Z86.79 HISTORY OF VENTRICULAR TACHYCARDIA: ICD-10-CM

## 2023-03-07 DIAGNOSIS — Z95.810 AICD (AUTOMATIC CARDIOVERTER/DEFIBRILLATOR) PRESENT: Primary | ICD-10-CM

## 2023-03-07 DIAGNOSIS — E87.6 HYPOKALEMIA: ICD-10-CM

## 2023-03-07 DIAGNOSIS — L02.215 PERINEAL ABSCESS: ICD-10-CM

## 2023-03-07 DIAGNOSIS — R11.0 NAUSEA IN ADULT: ICD-10-CM

## 2023-03-07 DIAGNOSIS — I73.9 PVD (PERIPHERAL VASCULAR DISEASE): ICD-10-CM

## 2023-03-07 DIAGNOSIS — F41.1 GAD (GENERALIZED ANXIETY DISORDER): ICD-10-CM

## 2023-03-07 PROCEDURE — 99213 PR OFFICE/OUTPT VISIT, EST, LEVL III, 20-29 MIN: ICD-10-PCS | Mod: S$GLB,,, | Performed by: INTERNAL MEDICINE

## 2023-03-07 PROCEDURE — 99213 OFFICE O/P EST LOW 20 MIN: CPT | Mod: S$GLB,,, | Performed by: INTERNAL MEDICINE

## 2023-03-07 RX ORDER — ONDANSETRON 4 MG/1
4 TABLET, FILM COATED ORAL EVERY 8 HOURS PRN
Qty: 15 TABLET | Refills: 0 | Status: SHIPPED | OUTPATIENT
Start: 2023-03-07 | End: 2023-03-12

## 2023-03-07 RX ORDER — LORAZEPAM 0.5 MG/1
0.5 TABLET ORAL EVERY 8 HOURS PRN
Qty: 20 TABLET | Refills: 0 | Status: SHIPPED | OUTPATIENT
Start: 2023-03-07 | End: 2023-04-12 | Stop reason: SDUPTHER

## 2023-03-07 NOTE — PROGRESS NOTES
Subjective:       Patient ID: Brandon Rivas is a 67 y.o. male.    Chief Complaint: Follow-up (Pt presents to the clinic for a f/u lab results)    Patient presents for follow-up of laboratory evaluation carried out at this facility on February 28, 2023.  This was mainly for follow-up of significant potassium derangements that are now nonexistent.  At the time of the lab work sodium was 140 potassium 4.8 chloride 105 bicarb of 25.  Glucose was normal at 109 with a BUN of 15 creatinine 0.8 giving him a calculated glomerular filtration rate over 60 cc/minute.  Calcium was normal at 9.6.      The last hospital stay was because of the fact that he had an AICD firing and he was recommended at the time to be started on amiodarone titration but patient prefers not to start the drug at this time.  He continues on his metoprolol succinate formulation 50 mg 1 a day.  Besides that patient is compliant to his spironolactone 25 mg 1 everyday come Prinivil 20 mg every day, K-Dur 10 mEq 1 everyday, baby aspirin 81 mg 1 everyday daily.  For the p.r.n. medications he is using the Zofran 4 mg 1 every 8 hours as needed for nausea on lorazepam 0.5 mg 1 tablet by mouth up to 3 times a day as needed.  Refills for both Zofran and lorazepam are to be issued today.    Vital signs are stable with a blood pressure 124/78 on at the time of examination patient is in normal sinus rhythm without any ectopy identified during the 30 seconds of auscultation.      As he mentioned now, the edema on his lower extremities has completely resolved and he has no pretibial edema at all.  It is now 11 20 in the morning.  He is already been up at least for the last 5 hours.    AICD was interrogated and no malignant arrhythmia was otherwise identified but again patient prefers no amiodarone for now.      Patient's nausea has completely abated.      The perineal abscess that he had responded to the Keflex and he has completely resolved also.          Review  of Systems   All other systems reviewed and are negative.      Objective:      Physical Exam  Vitals and nursing note reviewed.   Constitutional:       General: He is not in acute distress.     Appearance: Normal appearance. He is normal weight. He is not ill-appearing, toxic-appearing or diaphoretic.   HENT:      Head: Normocephalic and atraumatic.   Cardiovascular:      Rate and Rhythm: Normal rate and regular rhythm.      Pulses: Normal pulses.      Heart sounds: No murmur heard.    No gallop.   Pulmonary:      Effort: Pulmonary effort is normal. No respiratory distress.      Breath sounds: Normal breath sounds. No wheezing or rhonchi.   Musculoskeletal:      Right lower leg: No edema.      Left lower leg: No edema.   Skin:     General: Skin is warm and dry.      Coloration: Skin is not jaundiced or pale.   Neurological:      General: No focal deficit present.      Mental Status: He is alert and oriented to person, place, and time. Mental status is at baseline.      Cranial Nerves: No cranial nerve deficit.      Sensory: No sensory deficit.      Motor: No weakness.      Coordination: Coordination normal.      Gait: Gait normal.   Psychiatric:         Mood and Affect: Mood normal.         Behavior: Behavior normal.         Thought Content: Thought content normal.         Judgment: Judgment normal.       Assessment:       Problem List Items Addressed This Visit          Cardiac/Vascular    History of ventricular tachycardia    PVD (peripheral vascular disease)     Other Visit Diagnoses       AICD (automatic cardioverter/defibrillator) present    -  Primary    Nausea in adult        DERECK (generalized anxiety disorder)        Perineal abscess        Hypokalemia                Plan:       At this point patient is not in congestive heart failure with no extremity edema on no changes with regards to his stamina.  There is no orthopnea or paroxysmal nocturnal dyspnea.  Since that episode where the AICD fired there has  been no recurrence.    Patient prefers not to take the amiodarone at this time.      Medication placed has been reviewed on the on-drawn and lorazepam have been read issued.      Auscultation shows the patient to be in normal sinus rhythm.      Hypokalemia has resolved.      There is no significant azotemia with current therapy.      Patient remains on spironolactone with no excessive diuresis and prerenal azotemia, normal electrolytes on improved stamina.      And perineal abscess has resolved.    RTC in 2 months

## 2023-03-23 ENCOUNTER — DOCUMENT SCAN (OUTPATIENT)
Dept: HOME HEALTH SERVICES | Facility: HOSPITAL | Age: 68
End: 2023-03-23
Payer: MEDICARE

## 2023-03-24 ENCOUNTER — PATIENT OUTREACH (OUTPATIENT)
Dept: ADMINISTRATIVE | Facility: HOSPITAL | Age: 68
End: 2023-03-24
Payer: MEDICARE

## 2023-03-29 ENCOUNTER — EXTERNAL HOME HEALTH (OUTPATIENT)
Dept: HOME HEALTH SERVICES | Facility: HOSPITAL | Age: 68
End: 2023-03-29
Payer: MEDICARE

## 2023-04-03 ENCOUNTER — OFFICE VISIT (OUTPATIENT)
Dept: FAMILY MEDICINE | Facility: CLINIC | Age: 68
End: 2023-04-03
Payer: MEDICARE

## 2023-04-03 VITALS
HEART RATE: 82 BPM | SYSTOLIC BLOOD PRESSURE: 138 MMHG | DIASTOLIC BLOOD PRESSURE: 88 MMHG | OXYGEN SATURATION: 95 % | BODY MASS INDEX: 28.23 KG/M2 | WEIGHT: 213 LBS | HEIGHT: 73 IN | RESPIRATION RATE: 18 BRPM

## 2023-04-03 DIAGNOSIS — Z95.810 AICD (AUTOMATIC CARDIOVERTER/DEFIBRILLATOR) PRESENT: ICD-10-CM

## 2023-04-03 DIAGNOSIS — E83.42 HYPOMAGNESEMIA: ICD-10-CM

## 2023-04-03 DIAGNOSIS — F45.41 STRESS HEADACHE: Primary | ICD-10-CM

## 2023-04-03 DIAGNOSIS — F41.1 GAD (GENERALIZED ANXIETY DISORDER): ICD-10-CM

## 2023-04-03 DIAGNOSIS — I25.110 CORONARY ARTERY DISEASE INVOLVING NATIVE CORONARY ARTERY OF NATIVE HEART WITH UNSTABLE ANGINA PECTORIS: ICD-10-CM

## 2023-04-03 PROCEDURE — 99213 OFFICE O/P EST LOW 20 MIN: CPT | Mod: S$GLB,,, | Performed by: INTERNAL MEDICINE

## 2023-04-03 PROCEDURE — 99213 PR OFFICE/OUTPT VISIT, EST, LEVL III, 20-29 MIN: ICD-10-PCS | Mod: S$GLB,,, | Performed by: INTERNAL MEDICINE

## 2023-04-03 RX ORDER — BUTALBITAL, ACETAMINOPHEN AND CAFFEINE 50; 325; 40 MG/1; MG/1; MG/1
1 TABLET ORAL EVERY 4 HOURS PRN
Qty: 20 TABLET | Refills: 0 | Status: SHIPPED | OUTPATIENT
Start: 2023-04-03 | End: 2023-05-03

## 2023-04-03 RX ORDER — NITROGLYCERIN 0.4 MG/1
0.4 TABLET SUBLINGUAL EVERY 5 MIN PRN
Qty: 25 TABLET | Refills: 6 | Status: SHIPPED | OUTPATIENT
Start: 2023-04-03

## 2023-04-03 NOTE — PROGRESS NOTES
Subjective     Patient ID: Brandon Rivas is a 67 y.o. male.    Chief Complaint: Follow-up (Pt presents to the clinic for headache and heart racing. )    Patient presents without an appointment requesting to be seen because of headache and palpitations.      He has an AICD placed and there has been no depolarization by history.      There has been no changes in his medications.      He has been experiencing some headache that appears to be related to anxiety on his own words.  He said he takes an 8th of a [place] and he helps him with the symptomatology.  There are no signs of bleeding complications from same.      These headaches are not associated to trauma or any focal neurological deficits.      There is no changes with regards to his stamina.    He mentions that is back to speaking terms with his brother actually provided us with the brothers cell number today to call him in case there is any emergencies regarding his care or condition.      Being that the headaches appear to be stress related instead of using more anti-inflammatory agents or aspirin like medications I have recommended for him to have a trial of the Fioricet to take 1 every 4 hours as needed for pain a prescription for 21 with no refills will be issued.    Patient is in normal sinus rhythm at time of examination.    Besides the pressures speech which is not a new finding I do not see any changes regarding his behavior or physical condition.      He is not interested in following any care gaps at this time.    Review of Systems   All other systems reviewed and are negative.       Objective     Physical Exam  Vitals and nursing note reviewed.   Constitutional:       General: He is not in acute distress.     Appearance: Normal appearance. He is normal weight. He is not ill-appearing, toxic-appearing or diaphoretic.   HENT:      Head: Normocephalic and atraumatic.   Cardiovascular:      Rate and Rhythm: Normal rate and regular rhythm.       Pulses: Normal pulses.   Pulmonary:      Effort: Pulmonary effort is normal.   Skin:     General: Skin is warm and dry.      Coloration: Skin is not jaundiced or pale.   Neurological:      General: No focal deficit present.      Mental Status: He is alert and oriented to person, place, and time. Mental status is at baseline.      Cranial Nerves: No cranial nerve deficit.      Sensory: No sensory deficit.      Motor: No weakness.      Coordination: Coordination normal.      Gait: Gait normal.          Assessment and Plan     Problem List Items Addressed This Visit    None  Visit Diagnoses       Stress headache    -  Primary    DERECK (generalized anxiety disorder)        AICD (automatic cardioverter/defibrillator) present        Hypomagnesemia                For this most probable stress headaches patient will be prescribed a trial of Fioricet to take 1 by mouth every 4 hours as needed for pain.  Have recommended for him not to use too many of the anti-inflammatory agents on aspirin like products to avoid gastrointestinal, hepatic or renal toxicity.  He voices understanding.      He is in normal sinus rhythm at time my examination and by history has not had AICD depolarization.      Medication list was reviewed and there is no need for any other refills except he request refills on a nitroglycerin sublingual today which has been issued.      Blood pressure was slightly elevated at 154 systolic and been recommended to come by the office at his convenience for follow-up.  He has a device to be able to monitor it at home and he knows to call us if he is averaging equal to or higher than 136.    No care gaps are to be addressed as per patient request.    He has been using the lorazepam very sparingly and in fact from the prescription 20 he said he has at least 12 left.

## 2023-04-12 DIAGNOSIS — F41.1 GAD (GENERALIZED ANXIETY DISORDER): ICD-10-CM

## 2023-04-12 RX ORDER — LORAZEPAM 0.5 MG/1
0.5 TABLET ORAL EVERY 8 HOURS PRN
Qty: 30 TABLET | Refills: 0 | Status: SHIPPED | OUTPATIENT
Start: 2023-04-12 | End: 2023-05-09 | Stop reason: SDUPTHER

## 2023-04-15 ENCOUNTER — HOSPITAL ENCOUNTER (INPATIENT)
Facility: HOSPITAL | Age: 68
LOS: 1 days | Discharge: HOME HEALTH | DRG: 309 | End: 2023-04-16
Attending: EMERGENCY MEDICINE | Admitting: HOSPITALIST
Payer: MEDICARE

## 2023-04-15 DIAGNOSIS — R00.0 WIDE-COMPLEX TACHYCARDIA: Primary | ICD-10-CM

## 2023-04-15 DIAGNOSIS — R07.9 CHEST PAIN: ICD-10-CM

## 2023-04-15 PROBLEM — I47.20 V-TACH: Status: ACTIVE | Noted: 2023-02-24

## 2023-04-15 LAB
ALBUMIN SERPL BCP-MCNC: 4.1 G/DL (ref 3.5–5.2)
ALP SERPL-CCNC: 80 U/L (ref 55–135)
ALT SERPL W/O P-5'-P-CCNC: 22 U/L (ref 10–44)
ANION GAP SERPL CALC-SCNC: 13 MMOL/L (ref 8–16)
AST SERPL-CCNC: 22 U/L (ref 10–40)
BASOPHILS # BLD AUTO: 0.08 K/UL (ref 0–0.2)
BASOPHILS NFR BLD: 0.9 % (ref 0–1.9)
BILIRUB SERPL-MCNC: 0.8 MG/DL (ref 0.1–1)
BNP SERPL-MCNC: 857 PG/ML (ref 0–99)
BUN SERPL-MCNC: 16 MG/DL (ref 8–23)
CALCIUM SERPL-MCNC: 8.9 MG/DL (ref 8.7–10.5)
CHLORIDE SERPL-SCNC: 103 MMOL/L (ref 95–110)
CO2 SERPL-SCNC: 22 MMOL/L (ref 23–29)
CREAT SERPL-MCNC: 0.9 MG/DL (ref 0.5–1.4)
DIFFERENTIAL METHOD: ABNORMAL
EOSINOPHIL # BLD AUTO: 0.4 K/UL (ref 0–0.5)
EOSINOPHIL NFR BLD: 4.2 % (ref 0–8)
ERYTHROCYTE [DISTWIDTH] IN BLOOD BY AUTOMATED COUNT: 15.3 % (ref 11.5–14.5)
EST. GFR  (NO RACE VARIABLE): >60 ML/MIN/1.73 M^2
GLUCOSE SERPL-MCNC: 96 MG/DL (ref 70–110)
HCT VFR BLD AUTO: 39.7 % (ref 40–54)
HGB BLD-MCNC: 13.1 G/DL (ref 14–18)
IMM GRANULOCYTES # BLD AUTO: 0.02 K/UL (ref 0–0.04)
IMM GRANULOCYTES NFR BLD AUTO: 0.2 % (ref 0–0.5)
INR PPP: 1.2 (ref 0.8–1.2)
LYMPHOCYTES # BLD AUTO: 1.6 K/UL (ref 1–4.8)
LYMPHOCYTES NFR BLD: 18.6 % (ref 18–48)
MAGNESIUM SERPL-MCNC: 2 MG/DL (ref 1.6–2.6)
MCH RBC QN AUTO: 30.9 PG (ref 27–31)
MCHC RBC AUTO-ENTMCNC: 33 G/DL (ref 32–36)
MCV RBC AUTO: 94 FL (ref 82–98)
MONOCYTES # BLD AUTO: 0.9 K/UL (ref 0.3–1)
MONOCYTES NFR BLD: 9.7 % (ref 4–15)
NEUTROPHILS # BLD AUTO: 5.8 K/UL (ref 1.8–7.7)
NEUTROPHILS NFR BLD: 66.4 % (ref 38–73)
NRBC BLD-RTO: 0 /100 WBC
PLATELET # BLD AUTO: 200 K/UL (ref 150–450)
PMV BLD AUTO: 11.1 FL (ref 9.2–12.9)
POTASSIUM SERPL-SCNC: 3.9 MMOL/L (ref 3.5–5.1)
PROT SERPL-MCNC: 7.4 G/DL (ref 6–8.4)
PROTHROMBIN TIME: 12.4 SEC (ref 9–12.5)
RBC # BLD AUTO: 4.24 M/UL (ref 4.6–6.2)
SODIUM SERPL-SCNC: 138 MMOL/L (ref 136–145)
TROPONIN I SERPL HS-MCNC: 14.5 PG/ML (ref 0–14.9)
TROPONIN I SERPL HS-MCNC: 16.6 PG/ML (ref 0–14.9)
TROPONIN I SERPL HS-MCNC: 16.9 PG/ML (ref 0–14.9)
WBC # BLD AUTO: 8.73 K/UL (ref 3.9–12.7)

## 2023-04-15 PROCEDURE — 93005 ELECTROCARDIOGRAM TRACING: CPT | Performed by: SPECIALIST

## 2023-04-15 PROCEDURE — 63600175 PHARM REV CODE 636 W HCPCS: Performed by: INTERNAL MEDICINE

## 2023-04-15 PROCEDURE — 93010 EKG 12-LEAD: ICD-10-PCS | Mod: ,,, | Performed by: SPECIALIST

## 2023-04-15 PROCEDURE — 99285 EMERGENCY DEPT VISIT HI MDM: CPT | Mod: 25

## 2023-04-15 PROCEDURE — 85025 COMPLETE CBC W/AUTO DIFF WBC: CPT | Performed by: EMERGENCY MEDICINE

## 2023-04-15 PROCEDURE — 99223 PR INITIAL HOSPITAL CARE,LEVL III: ICD-10-PCS | Mod: ,,, | Performed by: INTERNAL MEDICINE

## 2023-04-15 PROCEDURE — 63600175 PHARM REV CODE 636 W HCPCS: Performed by: EMERGENCY MEDICINE

## 2023-04-15 PROCEDURE — 83880 ASSAY OF NATRIURETIC PEPTIDE: CPT | Performed by: EMERGENCY MEDICINE

## 2023-04-15 PROCEDURE — 84484 ASSAY OF TROPONIN QUANT: CPT | Mod: 91 | Performed by: EMERGENCY MEDICINE

## 2023-04-15 PROCEDURE — 96374 THER/PROPH/DIAG INJ IV PUSH: CPT

## 2023-04-15 PROCEDURE — 84484 ASSAY OF TROPONIN QUANT: CPT | Mod: 91 | Performed by: HOSPITALIST

## 2023-04-15 PROCEDURE — 99900031 HC PATIENT EDUCATION (STAT)

## 2023-04-15 PROCEDURE — 94761 N-INVAS EAR/PLS OXIMETRY MLT: CPT

## 2023-04-15 PROCEDURE — 25000003 PHARM REV CODE 250: Performed by: HOSPITALIST

## 2023-04-15 PROCEDURE — 80053 COMPREHEN METABOLIC PANEL: CPT | Performed by: EMERGENCY MEDICINE

## 2023-04-15 PROCEDURE — 99223 1ST HOSP IP/OBS HIGH 75: CPT | Mod: ,,, | Performed by: INTERNAL MEDICINE

## 2023-04-15 PROCEDURE — 63600175 PHARM REV CODE 636 W HCPCS

## 2023-04-15 PROCEDURE — 25000003 PHARM REV CODE 250: Performed by: EMERGENCY MEDICINE

## 2023-04-15 PROCEDURE — 36415 COLL VENOUS BLD VENIPUNCTURE: CPT | Performed by: EMERGENCY MEDICINE

## 2023-04-15 PROCEDURE — 25000003 PHARM REV CODE 250: Performed by: INTERNAL MEDICINE

## 2023-04-15 PROCEDURE — 25000003 PHARM REV CODE 250

## 2023-04-15 PROCEDURE — 96375 TX/PRO/DX INJ NEW DRUG ADDON: CPT

## 2023-04-15 PROCEDURE — S4991 NICOTINE PATCH NONLEGEND: HCPCS | Performed by: INTERNAL MEDICINE

## 2023-04-15 PROCEDURE — 83735 ASSAY OF MAGNESIUM: CPT | Performed by: EMERGENCY MEDICINE

## 2023-04-15 PROCEDURE — 36415 COLL VENOUS BLD VENIPUNCTURE: CPT | Performed by: HOSPITALIST

## 2023-04-15 PROCEDURE — 93010 ELECTROCARDIOGRAM REPORT: CPT | Mod: ,,, | Performed by: SPECIALIST

## 2023-04-15 PROCEDURE — 20000000 HC ICU ROOM

## 2023-04-15 PROCEDURE — 85610 PROTHROMBIN TIME: CPT | Performed by: EMERGENCY MEDICINE

## 2023-04-15 RX ORDER — METOPROLOL TARTRATE 1 MG/ML
5 INJECTION, SOLUTION INTRAVENOUS
Status: COMPLETED | OUTPATIENT
Start: 2023-04-15 | End: 2023-04-15

## 2023-04-15 RX ORDER — LISINOPRIL 20 MG/1
20 TABLET ORAL DAILY
Status: DISCONTINUED | OUTPATIENT
Start: 2023-04-15 | End: 2023-04-16 | Stop reason: HOSPADM

## 2023-04-15 RX ORDER — AMIODARONE HYDROCHLORIDE 150 MG/3ML
150 INJECTION, SOLUTION INTRAVENOUS
Status: COMPLETED | OUTPATIENT
Start: 2023-04-15 | End: 2023-04-15

## 2023-04-15 RX ORDER — ADENOSINE 3 MG/ML
INJECTION, SOLUTION INTRAVENOUS
Status: DISPENSED
Start: 2023-04-15 | End: 2023-04-15

## 2023-04-15 RX ORDER — NITROGLYCERIN 0.4 MG/1
0.4 TABLET SUBLINGUAL EVERY 5 MIN PRN
Status: DISCONTINUED | OUTPATIENT
Start: 2023-04-15 | End: 2023-04-16 | Stop reason: HOSPADM

## 2023-04-15 RX ORDER — AMIODARONE HYDROCHLORIDE 150 MG/3ML
INJECTION, SOLUTION INTRAVENOUS
Status: COMPLETED
Start: 2023-04-15 | End: 2023-04-15

## 2023-04-15 RX ORDER — METOPROLOL TARTRATE 1 MG/ML
5 INJECTION, SOLUTION INTRAVENOUS
Status: ACTIVE | OUTPATIENT
Start: 2023-04-15 | End: 2023-04-15

## 2023-04-15 RX ORDER — MAGNESIUM SULFATE 1 G/100ML
INJECTION INTRAVENOUS
Status: COMPLETED
Start: 2023-04-15 | End: 2023-04-15

## 2023-04-15 RX ORDER — SPIRONOLACTONE 25 MG/1
25 TABLET ORAL DAILY
Status: DISCONTINUED | OUTPATIENT
Start: 2023-04-15 | End: 2023-04-16 | Stop reason: HOSPADM

## 2023-04-15 RX ORDER — POLYETHYLENE GLYCOL 3350 17 G/17G
17 POWDER, FOR SOLUTION ORAL 2 TIMES DAILY PRN
Status: DISCONTINUED | OUTPATIENT
Start: 2023-04-15 | End: 2023-04-16 | Stop reason: HOSPADM

## 2023-04-15 RX ORDER — TALC
6 POWDER (GRAM) TOPICAL NIGHTLY PRN
Status: DISCONTINUED | OUTPATIENT
Start: 2023-04-15 | End: 2023-04-16 | Stop reason: HOSPADM

## 2023-04-15 RX ORDER — BUTALBITAL, ACETAMINOPHEN AND CAFFEINE 50; 325; 40 MG/1; MG/1; MG/1
1 TABLET ORAL EVERY 4 HOURS PRN
Status: DISCONTINUED | OUTPATIENT
Start: 2023-04-15 | End: 2023-04-16 | Stop reason: HOSPADM

## 2023-04-15 RX ORDER — PANTOPRAZOLE SODIUM 40 MG/1
40 TABLET, DELAYED RELEASE ORAL
Status: DISCONTINUED | OUTPATIENT
Start: 2023-04-15 | End: 2023-04-16 | Stop reason: HOSPADM

## 2023-04-15 RX ORDER — SODIUM CHLORIDE 0.9 % (FLUSH) 0.9 %
10 SYRINGE (ML) INJECTION
Status: DISCONTINUED | OUTPATIENT
Start: 2023-04-15 | End: 2023-04-16 | Stop reason: HOSPADM

## 2023-04-15 RX ORDER — IBUPROFEN 200 MG
1 TABLET ORAL DAILY
Status: DISCONTINUED | OUTPATIENT
Start: 2023-04-15 | End: 2023-04-16 | Stop reason: HOSPADM

## 2023-04-15 RX ORDER — ONDANSETRON 2 MG/ML
4 INJECTION INTRAMUSCULAR; INTRAVENOUS EVERY 8 HOURS PRN
Status: DISCONTINUED | OUTPATIENT
Start: 2023-04-15 | End: 2023-04-16 | Stop reason: HOSPADM

## 2023-04-15 RX ORDER — FUROSEMIDE 40 MG/1
40 TABLET ORAL DAILY
Status: DISCONTINUED | OUTPATIENT
Start: 2023-04-15 | End: 2023-04-16 | Stop reason: HOSPADM

## 2023-04-15 RX ORDER — ACETAMINOPHEN 325 MG/1
650 TABLET ORAL EVERY 8 HOURS PRN
Status: DISCONTINUED | OUTPATIENT
Start: 2023-04-15 | End: 2023-04-16 | Stop reason: HOSPADM

## 2023-04-15 RX ORDER — MAGNESIUM SULFATE HEPTAHYDRATE 40 MG/ML
2 INJECTION, SOLUTION INTRAVENOUS ONCE
Status: COMPLETED | OUTPATIENT
Start: 2023-04-15 | End: 2023-04-15

## 2023-04-15 RX ORDER — MUPIROCIN 20 MG/G
OINTMENT TOPICAL 2 TIMES DAILY
Status: DISCONTINUED | OUTPATIENT
Start: 2023-04-15 | End: 2023-04-16 | Stop reason: HOSPADM

## 2023-04-15 RX ORDER — ENOXAPARIN SODIUM 100 MG/ML
40 INJECTION SUBCUTANEOUS
Status: DISCONTINUED | OUTPATIENT
Start: 2023-04-15 | End: 2023-04-16 | Stop reason: HOSPADM

## 2023-04-15 RX ORDER — LORAZEPAM 0.5 MG/1
0.5 TABLET ORAL EVERY 8 HOURS PRN
Status: DISCONTINUED | OUTPATIENT
Start: 2023-04-15 | End: 2023-04-16 | Stop reason: HOSPADM

## 2023-04-15 RX ORDER — NAPROXEN SODIUM 220 MG/1
81 TABLET, FILM COATED ORAL DAILY
Status: DISCONTINUED | OUTPATIENT
Start: 2023-04-15 | End: 2023-04-16 | Stop reason: HOSPADM

## 2023-04-15 RX ORDER — CHLORHEXIDINE GLUCONATE ORAL RINSE 1.2 MG/ML
15 SOLUTION DENTAL 2 TIMES DAILY
Status: DISCONTINUED | OUTPATIENT
Start: 2023-04-15 | End: 2023-04-16 | Stop reason: HOSPADM

## 2023-04-15 RX ADMIN — CHLORHEXIDINE GLUCONATE 15 ML: 1.2 RINSE ORAL at 08:04

## 2023-04-15 RX ADMIN — AMIODARONE HYDROCHLORIDE 0.5 MG/MIN: 1.8 INJECTION, SOLUTION INTRAVENOUS at 03:04

## 2023-04-15 RX ADMIN — AMIODARONE HYDROCHLORIDE 1 MG/MIN: 1.8 INJECTION, SOLUTION INTRAVENOUS at 02:04

## 2023-04-15 RX ADMIN — METOPROLOL TARTRATE 5 MG: 1 INJECTION, SOLUTION INTRAVENOUS at 03:04

## 2023-04-15 RX ADMIN — ASPIRIN 81 MG 81 MG: 81 TABLET ORAL at 11:04

## 2023-04-15 RX ADMIN — MUPIROCIN 1 G: 20 OINTMENT TOPICAL at 08:04

## 2023-04-15 RX ADMIN — CHLORHEXIDINE GLUCONATE 15 ML: 1.2 RINSE ORAL at 09:04

## 2023-04-15 RX ADMIN — NICOTINE 1 PATCH: 14 PATCH, EXTENDED RELEASE TRANSDERMAL at 05:04

## 2023-04-15 RX ADMIN — LISINOPRIL 20 MG: 20 TABLET ORAL at 11:04

## 2023-04-15 RX ADMIN — MAGNESIUM SULFATE 2 G: 2 INJECTION INTRAVENOUS at 03:04

## 2023-04-15 RX ADMIN — LORAZEPAM 0.5 MG: 0.5 TABLET ORAL at 09:04

## 2023-04-15 RX ADMIN — LORAZEPAM 0.5 MG: 0.5 TABLET ORAL at 08:04

## 2023-04-15 RX ADMIN — AMIODARONE HYDROCHLORIDE 150 MG: 50 INJECTION, SOLUTION INTRAVENOUS at 01:04

## 2023-04-15 RX ADMIN — MUPIROCIN 1 G: 20 OINTMENT TOPICAL at 09:04

## 2023-04-15 RX ADMIN — Medication 6 MG: at 08:04

## 2023-04-15 RX ADMIN — AMIODARONE HYDROCHLORIDE 1 MG/MIN: 1.8 INJECTION, SOLUTION INTRAVENOUS at 07:04

## 2023-04-15 RX ADMIN — ACETAMINOPHEN 650 MG: 325 TABLET ORAL at 03:04

## 2023-04-15 RX ADMIN — AMIODARONE HYDROCHLORIDE 150 MG: 150 INJECTION, SOLUTION INTRAVENOUS at 01:04

## 2023-04-15 RX ADMIN — SPIRONOLACTONE 25 MG: 25 TABLET ORAL at 11:04

## 2023-04-15 NOTE — SUBJECTIVE & OBJECTIVE
Past Medical History:   Diagnosis Date    Coronary artery disease     Hypertension     MI (myocardial infarction)     Thyroid disease        Past Surgical History:   Procedure Laterality Date    CARDIAC PACEMAKER PLACEMENT         Review of patient's allergies indicates:   Allergen Reactions    Bactrim [sulfamethoxazole-trimethoprim] Shortness Of Breath    Statins-hmg-coa reductase inhibitors Other (See Comments)     Statin intolerance. Muscle weakness    Wellbutrin [bupropion hcl] Other (See Comments)     Abdominal pain        No current facility-administered medications on file prior to encounter.     Current Outpatient Medications on File Prior to Encounter   Medication Sig    amiodarone (PACERONE) 200 MG Tab Take 2 tablets (400 mg total) by mouth 2 (two) times daily for 3 days, THEN 1 tablet (200 mg total) 2 (two) times daily.    aspirin 81 MG Chew Take 1 tablet (81 mg total) by mouth once daily.    butalbital-acetaminophen-caffeine -40 mg (FIORICET, ESGIC) -40 mg per tablet Take 1 tablet by mouth every 4 (four) hours as needed for Headaches.    furosemide (LASIX) 40 MG tablet Take 1 tablet (40 mg total) by mouth once daily.    lisinopriL (PRINIVIL,ZESTRIL) 20 MG tablet Take 1 tablet (20 mg total) by mouth once daily.    LORazepam (ATIVAN) 0.5 MG tablet Take 1 tablet (0.5 mg total) by mouth every 8 (eight) hours as needed for Anxiety.    metoprolol succinate (TOPROL-XL) 50 MG 24 hr tablet Take 1 tablet (50 mg total) by mouth once daily.    nitroGLYCERIN (NITROSTAT) 0.4 MG SL tablet Place 1 tablet (0.4 mg total) under the tongue every 5 (five) minutes as needed for Chest pain.    pantoprazole (PROTONIX) 40 MG tablet Take 40 mg by mouth once daily.    potassium chloride (KLOR-CON) 10 MEQ TbSR Take 1 tablet (10 mEq total) by mouth every other day.    spironolactone (ALDACTONE) 25 MG tablet Take 1 tablet (25 mg total) by mouth once daily.    tamsulosin (FLOMAX) 0.4 mg Cap Take 1 capsule by mouth once  daily.     Family History       Problem Relation (Age of Onset)    Fibromyalgia Mother    Heart disease Father    Hyperlipidemia Father          Tobacco Use    Smoking status: Every Day     Packs/day: 1.00     Years: 40.00     Pack years: 40.00     Types: Cigarettes    Smokeless tobacco: Never   Substance and Sexual Activity    Alcohol use: Yes     Comment: 12 pack beer daily    Drug use: No    Sexual activity: Not on file     Review of Systems Complete ROS otherwise negative other than stated in HPI.   Objective:     Vital Signs (Most Recent):  Temp: 98.2 °F (36.8 °C) (04/15/23 0114)  Pulse: (!) 127 (04/15/23 0326)  Resp: (!) 24 (04/15/23 0326)  BP: 137/84 (04/15/23 0329)  SpO2: 95 % (04/15/23 0326)   Vital Signs (24h Range):  Temp:  [98.2 °F (36.8 °C)] 98.2 °F (36.8 °C)  Pulse:  [] 127  Resp:  [17-37] 24  SpO2:  [95 %-100 %] 95 %  BP: (123-160)/(76-99) 137/84     Weight: 95.3 kg (210 lb)  Body mass index is 27.71 kg/m².    Physical Exam  GENERAL:  Somewhat anxious.  Well-developed.  Alert and oriented x 3  HEENT:  EOMI. Conjunctivae intact.   NECK:  Supple   LUNGS:  Bibasilar crackles, upper airways clear, no increased work of breathing, on nasal cannula  CARDIAC:  RRR without murmur, rub or gallop  ABDOMEN:  Positive bowel sounds. Nontender and nondistended.   EXTREMITIES:  Peripheral pulses are 2+. Hands and feet are warm. Good capillary refill in fingers (< 2 seconds). No clubbing, cyanosis or edema  SKIN:  Skin color, texture and turgor normal. No rashes, ulcerations or nodules noted  NEUROLOGIC:  CN II-XII intact. Light touch sensation intact. Muscle strength 5/5 in all extremities          Significant Labs: All pertinent labs within the past 24 hours have been reviewed.  CBC:   Recent Labs   Lab 04/15/23  0135   WBC 8.73   HGB 13.1*   HCT 39.7*        CMP:   Recent Labs   Lab 04/15/23  0135      K 3.9      CO2 22*   GLU 96   BUN 16   CREATININE 0.9   CALCIUM 8.9   PROT 7.4   ALBUMIN  4.1   BILITOT 0.8   ALKPHOS 80   AST 22   ALT 22   ANIONGAP 13     Cardiac Markers:   Recent Labs   Lab 04/15/23  0135   *     Coagulation:   Recent Labs   Lab 04/15/23  0135   INR 1.2     Lactic Acid: No results for input(s): LACTATE in the last 48 hours.  Troponin:   Recent Labs   Lab 04/15/23  0135   TROPONINIHS 16.6*     TSH: No results for input(s): TSH in the last 4320 hours.    Significant Imaging: I have reviewed all pertinent imaging results/findings within the past 24 hours.

## 2023-04-15 NOTE — ASSESSMENT & PLAN NOTE
Grade 3 diastolic dysfunction with EF 34% on last echo.  He has pulmonary edema on exam but refuses any Lasix.

## 2023-04-15 NOTE — ASSESSMENT & PLAN NOTE
Discussed initiating a low-dose SSRI with him at length.  He is very hesitant about medications, will consider it  Continue his anxiety medication as needed in the meantime

## 2023-04-15 NOTE — ED PROVIDER NOTES
Encounter Date: 4/15/2023       History     Chief Complaint   Patient presents with    Chest Pain     Chest pain x 1 day. States heart rate was in 180's.  Took 4 ntg at home.      Patient with history ischemic cardiomyopathy.  Patient reports last week he had his AICD discharge.  He has been having intermittent palpitations with chest pain.  More often today.  Patient reports today he has been having intermittent chest pain associated with tachycardia with heart rate up to 180.  Patient took 4 nitroglycerin at home with no relief.  Mother reports patient is noncompliant with his amiodarone.  There is no fever chills.  No pleurisy hemoptysis.    Review of patient's allergies indicates:   Allergen Reactions    Bactrim [sulfamethoxazole-trimethoprim] Shortness Of Breath    Statins-hmg-coa reductase inhibitors Other (See Comments)     Statin intolerance. Muscle weakness    Wellbutrin [bupropion hcl] Other (See Comments)     Abdominal pain      Past Medical History:   Diagnosis Date    Coronary artery disease     Hypertension     MI (myocardial infarction)     Thyroid disease      Past Surgical History:   Procedure Laterality Date    CARDIAC PACEMAKER PLACEMENT       Family History   Problem Relation Age of Onset    Fibromyalgia Mother     Heart disease Father     Hyperlipidemia Father      Social History     Tobacco Use    Smoking status: Every Day     Packs/day: 1.00     Years: 40.00     Pack years: 40.00     Types: Cigarettes    Smokeless tobacco: Never   Substance Use Topics    Alcohol use: Yes     Comment: 12 pack beer daily    Drug use: No     Review of Systems   Constitutional:  Negative for chills and fever.   HENT:  Negative for sore throat.    Eyes:  Negative for photophobia and visual disturbance.   Respiratory:  Positive for shortness of breath.    Cardiovascular:  Positive for chest pain.   Gastrointestinal:  Negative for abdominal pain and vomiting.   Genitourinary:  Negative for dysuria.   Musculoskeletal:   Negative for joint swelling.   Skin:  Negative for rash.   Neurological:  Negative for seizures, syncope and headaches.   Psychiatric/Behavioral:  Negative for confusion.      Physical Exam     Initial Vitals [04/15/23 0114]   BP Pulse Resp Temp SpO2   (!) 160/99 81 17 98.2 °F (36.8 °C) 99 %      MAP       --         Physical Exam    Nursing note and vitals reviewed.  Constitutional: He is not diaphoretic. No distress.   HENT:   Head: Normocephalic and atraumatic.   Eyes: Conjunctivae are normal.   Neck:   Normal range of motion.  Cardiovascular:  Regular rhythm.           Pulmonary/Chest: Breath sounds normal.   AICD in place left upper chest wall   Abdominal: Abdomen is soft. There is no abdominal tenderness.   Musculoskeletal:         General: Normal range of motion.      Cervical back: Normal range of motion.     Neurological: He is alert. He has normal strength. No cranial nerve deficit or sensory deficit.   Skin: No rash noted.   Psychiatric: He has a normal mood and affect.       ED Course   Critical Care    Date/Time: 4/15/2023 3:25 AM  Performed by: Winston Angeles MD  Authorized by: Rochelle Bailey MD   Direct patient critical care time: 15 minutes  Additional history critical care time: 5 minutes  Ordering / reviewing critical care time: 5 minutes  Documentation critical care time: 5 minutes  Consulting other physicians critical care time: 5 minutes  Total critical care time (exclusive of procedural time) : 35 minutes      Labs Reviewed   CBC W/ AUTO DIFFERENTIAL - Abnormal; Notable for the following components:       Result Value    RBC 4.24 (*)     Hemoglobin 13.1 (*)     Hematocrit 39.7 (*)     RDW 15.3 (*)     All other components within normal limits   COMPREHENSIVE METABOLIC PANEL - Abnormal; Notable for the following components:    CO2 22 (*)     All other components within normal limits   B-TYPE NATRIURETIC PEPTIDE - Abnormal; Notable for the following components:     (*)     All other  components within normal limits   TROPONIN I HIGH SENSITIVITY - Abnormal; Notable for the following components:    Troponin I High Sensitivity 16.6 (*)     All other components within normal limits   PROTIME-INR   MAGNESIUM   TROPONIN I HIGH SENSITIVITY          Imaging Results              X-Ray Chest AP Portable (In process)                      Medications   adenosine (ADENOCARD) 3 mg/mL injection (has no administration in time range)   amiodarone 360 mg/200 mL (1.8 mg/mL) infusion (1 mg/min Intravenous New Bag 4/15/23 0259)   amiodarone 360 mg/200 mL (1.8 mg/mL) infusion (has no administration in time range)   metoprolol injection 5 mg (has no administration in time range)   metoprolol injection 5 mg (5 mg Intravenous Given 4/15/23 0323)   amiodarone (CORDARONE) 360 mg/200 mL (1.8 mg/mL) infusion (  Not Given 4/15/23 0200)   amiodarone injection 150 mg (150 mg Intravenous Given 4/15/23 0151)   magnesium sulfate 2g in water 50mL IVPB (premix) (0 g Intravenous Stopped 4/15/23 0301)   magnesium sulfate in dextrose 1 gram/100 mL IVPB (premix) (  Override Pull 4/15/23 0200)     Medical Decision Making:   History:   Old Medical Records: I decided to obtain old medical records.  Old Records Summarized: records from clinic visits and records from previous admission(s).       <> Summary of Records: History of AICD discharge reportedly from SVT.  Differential Diagnosis:   SVT, ventricular tachycardia, acute coronary syndrome  Independently Interpreted Test(s):   I have ordered and independently interpreted X-rays - see summary below.       <> Summary of X-Ray Reading(s): Mild interstitial infiltrates, cardiomegaly  I have ordered and independently interpreted EKG Reading(s) - see summary below       <> Summary of EKG Reading(s): Normal sinus rhythm  Clinical Tests:   Lab Tests: Reviewed  Radiological Study: Reviewed  Medical Tests: Reviewed  ED Management:  Patient originally presents in normal sinus rhythm.  Patient  noted to have wide complex tachycardia at a heart rate of 160.  Patient becomes symptomatic with chest pain shortness of breath during event.  Amiodarone 150 mg bolus given.  Patient returned to normal sinus rhythm.  Patient with multiple similar events.  Blood pressure remained stable.  Wide complex tachycardia last less than 30 seconds.  Metoprolol IV given.  Magnesium IV given.  There is decreased frequency of runs of wide complex tachycardia.  [employer] AICD interrogated.  They did not  arrhythmias from today.  They report from several days ago he had tachycardia unknown if ventricular tachycardia or SVT.  Discussed with Dr. DEVYN John on-call for Cardiology.  Given patient's noncompliance with amiodarone continue with amiodarone loading.  Hospitals consulted for admission.  Electrolytes are essentially stable.                        Clinical Impression:   Final diagnoses:  [R07.9] Chest pain  [R00.0] Wide-complex tachycardia (Primary)        ED Disposition Condition    Admit                 Winston Angeles MD  04/15/23 [id number]

## 2023-04-15 NOTE — HPI
"67-year-old man with CAD status post stenting, hyperlipidemia, hypertension, chronic combined CHF status post AICD, tobacco use, presenting with chest pain and palpitations.  Patient reports last week AICD firing.  He has had intermittent palpitations with chest pain since, and they worsened today.  He measured his heart rate at 180 at home.   He presented to the ED where EKG showed V-tach versus SVT.  He was treated with 5 mg IV metoprolol, 2 g magnesium, and 150 mg amiodarone push.  Cardiology was contacted and he was started on amiodarone infusion.  He has gone in and out of wide complex tachycardia while in the ED with stable blood pressure and mentation.    Patient reports he has been very depressed and stressed about his medical problems and this tends to make his  heart rate increase.  He takes amiodarone but only once daily instead of twice because he is concerned about lung effects.  He is noncompliant with Lasix because it "tears up his insides".  He reports compliance with his metoprolol.  He continues to smoke cigars a few puffs a day.  He no longer uses alcohol.  He was recently treated for a UTI which he states is resolved and he had a urinalysis last week showing clearance.  He denies current chest pain, fever, chills, cough, dizziness, syncope, shortness of breath, vision changes.  "

## 2023-04-15 NOTE — NURSING
[employer] device checked and information transmitted to [employer]. Call from April at [employer]. She said that the device was checked in ER. She will send us a copy of that report also.

## 2023-04-15 NOTE — H&P
"[facility] - Emergency Dept  Hospital Medicine  History & Physical    Patient Name: Brandon Rivas  MRN: [id number]  Patient Class: IP- Inpatient  Admission Date: 4/15/2023  Attending Physician: Rochelle Bailey MD  Primary Care Provider: James Garcia MD       Patient seen at 4:00 a.m. on 04/15/2023  Patient information was obtained from patient, relative(s) and ER records.     Subjective:     Principal Problem:V-tach    Chief Complaint:   Chief Complaint   Patient presents with    Chest Pain     Chest pain x 1 day. States heart rate was in 180's.  Took 4 ntg at home.         HPI: 67-year-old man with CAD status post stenting, hyperlipidemia, hypertension, chronic combined CHF status post AICD, tobacco use, presenting with chest pain and palpitations.  Patient reports last week AICD firing.  He has had intermittent palpitations with chest pain since, and they worsened today.  He measured his heart rate at 180 at home.   He presented to the ED where EKG showed V-tach versus SVT.  He was treated with 5 mg IV metoprolol, 2 g magnesium, and 150 mg amiodarone push.  Cardiology was contacted and he was started on amiodarone infusion.  He has gone in and out of wide complex tachycardia while in the ED with stable blood pressure and mentation.    Patient reports he has been very depressed and stressed about his medical problems and this tends to make his  heart rate increase.  He takes amiodarone but only once daily instead of twice because he is concerned about lung effects.  He is noncompliant with Lasix because it "tears up his insides".  He reports compliance with his metoprolol.  He continues to smoke cigars a few puffs a day.  He no longer uses alcohol.  He was recently treated for a UTI which he states is resolved and he had a urinalysis last week showing clearance.  He denies current chest pain, fever, chills, cough, dizziness, syncope, shortness of breath, vision changes.      Past Medical " History:   Diagnosis Date    Coronary artery disease     Hypertension     MI (myocardial infarction)     Thyroid disease        Past Surgical History:   Procedure Laterality Date    CARDIAC PACEMAKER PLACEMENT         Review of patient's allergies indicates:   Allergen Reactions    Bactrim [sulfamethoxazole-trimethoprim] Shortness Of Breath    Statins-hmg-coa reductase inhibitors Other (See Comments)     Statin intolerance. Muscle weakness    Wellbutrin [bupropion hcl] Other (See Comments)     Abdominal pain        No current facility-administered medications on file prior to encounter.     Current Outpatient Medications on File Prior to Encounter   Medication Sig    amiodarone (PACERONE) 200 MG Tab Take 2 tablets (400 mg total) by mouth 2 (two) times daily for 3 days, THEN 1 tablet (200 mg total) 2 (two) times daily.    aspirin 81 MG Chew Take 1 tablet (81 mg total) by mouth once daily.    butalbital-acetaminophen-caffeine -40 mg (FIORICET, ESGIC) -40 mg per tablet Take 1 tablet by mouth every 4 (four) hours as needed for Headaches.    furosemide (LASIX) 40 MG tablet Take 1 tablet (40 mg total) by mouth once daily.    lisinopriL (PRINIVIL,ZESTRIL) 20 MG tablet Take 1 tablet (20 mg total) by mouth once daily.    LORazepam (ATIVAN) 0.5 MG tablet Take 1 tablet (0.5 mg total) by mouth every 8 (eight) hours as needed for Anxiety.    metoprolol succinate (TOPROL-XL) 50 MG 24 hr tablet Take 1 tablet (50 mg total) by mouth once daily.    nitroGLYCERIN (NITROSTAT) 0.4 MG SL tablet Place 1 tablet (0.4 mg total) under the tongue every 5 (five) minutes as needed for Chest pain.    pantoprazole (PROTONIX) 40 MG tablet Take 40 mg by mouth once daily.    potassium chloride (KLOR-CON) 10 MEQ TbSR Take 1 tablet (10 mEq total) by mouth every other day.    spironolactone (ALDACTONE) 25 MG tablet Take 1 tablet (25 mg total) by mouth once daily.    tamsulosin (FLOMAX) 0.4 mg Cap Take 1 capsule by mouth  once daily.     Family History       Problem Relation (Age of Onset)    Fibromyalgia Mother    Heart disease Father    Hyperlipidemia Father          Tobacco Use    Smoking status: Every Day     Packs/day: 1.00     Years: 40.00     Pack years: 40.00     Types: Cigarettes    Smokeless tobacco: Never   Substance and Sexual Activity    Alcohol use: Yes     Comment: 12 pack beer daily    Drug use: No    Sexual activity: Not on file     Review of Systems Complete ROS otherwise negative other than stated in HPI.   Objective:     Vital Signs (Most Recent):  Temp: 98.2 °F (36.8 °C) (04/15/23 0114)  Pulse: (!) 127 (04/15/23 0326)  Resp: (!) 24 (04/15/23 0326)  BP: 137/84 (04/15/23 0329)  SpO2: 95 % (04/15/23 0326)   Vital Signs (24h Range):  Temp:  [98.2 °F (36.8 °C)] 98.2 °F (36.8 °C)  Pulse:  [] 127  Resp:  [17-37] 24  SpO2:  [95 %-100 %] 95 %  BP: (123-160)/(76-99) 137/84     Weight: 95.3 kg (210 lb)  Body mass index is 27.71 kg/m².    Physical Exam  GENERAL:  Somewhat anxious.  Well-developed.  Alert and oriented x 3  HEENT:  EOMI. Conjunctivae intact.   NECK:  Supple   LUNGS:  Bibasilar crackles, upper airways clear, no increased work of breathing, on nasal cannula  CARDIAC:  RRR without murmur, rub or gallop  ABDOMEN:  Positive bowel sounds. Nontender and nondistended.   EXTREMITIES:  Peripheral pulses are 2+. Hands and feet are warm. Good capillary refill in fingers (< 2 seconds). No clubbing, cyanosis or edema  SKIN:  Skin color, texture and turgor normal. No rashes, ulcerations or nodules noted  NEUROLOGIC:  CN II-XII intact. Light touch sensation intact. Muscle strength 5/5 in all extremities          Significant Labs: All pertinent labs within the past 24 hours have been reviewed.  CBC:   Recent Labs   Lab 04/15/23  0135   WBC 8.73   HGB 13.1*   HCT 39.7*        CMP:   Recent Labs   Lab 04/15/23  0135      K 3.9      CO2 22*   GLU 96   BUN 16   CREATININE 0.9   CALCIUM 8.9   PROT 7.4    ALBUMIN 4.1   BILITOT 0.8   ALKPHOS 80   AST 22   ALT 22   ANIONGAP 13     Cardiac Markers:   Recent Labs   Lab 04/15/23  0135   *     Coagulation:   Recent Labs   Lab 04/15/23  0135   INR 1.2     Lactic Acid: No results for input(s): LACTATE in the last 48 hours.  Troponin:   Recent Labs   Lab 04/15/23  0135   TROPONINIHS 16.6*     TSH: No results for input(s): TSH in the last 4320 hours.    Significant Imaging: I have reviewed all pertinent imaging results/findings within the past 24 hours.    Assessment/Plan:     * V-tach  Still having intermittent episodes.  Admit to ICU.  Continue  Amiodarone drip, Cardiology consult  Possibly triggered by medication noncompliance and stress      Moderate episode of recurrent major depressive disorder  Discussed initiating a low-dose SSRI with him at length.  He is very hesitant about medications, will consider it  Continue his anxiety medication as needed in the meantime      Chronic combined systolic and diastolic heart failure  Grade 3 diastolic dysfunction with EF 34% on last echo.  He has pulmonary edema on exam but refuses any Lasix.      PVD (peripheral vascular disease)        Tobacco dependence  Smokes a few puffs of cigar daily.  Refuses nicotine patch    Coronary artery disease        Hypertension  Blood pressure elevated.  Resume home oral medications and monitor                     Rochelle Bailey MD  Department of Hospital Medicine  [facility]

## 2023-04-15 NOTE — PROGRESS NOTES
"[facility]  Adult Nutrition   Progress Note (Initial Assessment)     SUMMARY     Recommendations  1.) Advance diet when medically appropriate to cardiac.   2.) Suggest MVI for general health.   3.) RD to monitor diet advancement, intake, weight, labs and provide ongoing recommendations and interventions.    Goals:   1.) Diet to advance within 48hr.  Nutrition Goal Status: new    Dietitian Rounds Brief  Pt presenting with chest pain and palpitations.  Patient reports last week AICD firing.  He has had intermittent palpitations with chest pain since, and they worsened today.  He measured his heart rate at 180 at home. He presented to the ED where EKG showed V-tach versus SVT. Pt currently NPO awaiting MD to round for diet advancement. Pt reports fair appetite at home, following a low Na diet. No chew/swallowing issues. No GI distress. LBM 4/14. Skin: intact. Wt reviewed. NFPE completed with no s/s of wasting. No malnutrition identified.    Diet order:   Current Diet Order: NPO     Evaluation of Received Nutrient/Fluid Intake  Energy Calories Required: not meeting needs  Protein Required: not meeting needs  Fluid Required: meeting needs  Tolerance: other (see comments) (NPO)     % Intake of Estimated Energy Needs: 0%  % Meal Intake: NPO      Intake/Output Summary (Last 24 hours) at 4/15/2023 1002  Last data filed at 4/15/2023 0723  Gross per 24 hour   Intake 50 ml   Output 300 ml   Net -250 ml        Anthropometrics  Temp: 97.2 °F (36.2 °C)  Height Method: Stated  Height: 6' 1" (185.4 cm)  Height (inches): 73 in  Weight Method: Bed Scale  Weight: 97 kg (213 lb 13.5 oz)  Weight (lb): 213.85 lb  Ideal Body Weight (IBW), Male: 184 lb  % Ideal Body Weight, Male (lb): 116.22 %  BMI (Calculated): 28.2  BMI Grade: 25 - 29.9 - overweight       Estimated/Assessed Needs  Weight Used For Calorie Calculations: 97 kg (213 lb 13.5 oz)  Energy Calorie Requirements (kcal): 4494-9523  Energy Need Method: Kcal/kg " (20-25)  Protein Requirements: 78-97 (0.8-1.0)  Weight Used For Protein Calculations: 97 kg (213 lb 13.5 oz)  Fluid Requirements (mL): 1693-0403     RDA Method (mL): 1940       Reason for Assessment  Reason For Assessment: identified at risk by screening criteria (ICU admit)  Diagnosis: cardiac disease  Relevant Medical History: CAD status post stenting, hyperlipidemia, hypertension, chronic combined CHF status post AICD, tobacco use    Nutrition/Diet History  Food Allergies: NKFA  Factors Affecting Nutritional Intake: NPO    Nutrition Risk Screen  Nutrition Risk Screen: no indicators present     MST Score: 0  Have you recently lost weight without trying?: No  Weight loss score: 0  Have you been eating poorly because of a decreased appetite?: No  Appetite score: 0       Weight History:  Wt Readings from Last 5 Encounters:   04/15/23 97 kg (213 lb 13.5 oz)   04/03/23 96.6 kg (213 lb)   03/07/23 94.8 kg (209 lb)   02/24/23 94.3 kg (208 lb)   01/10/23 101.6 kg (224 lb)        Lab/Procedures/Meds: Pertinent Labs/Meds Reviewed    Medications:Pertinent Medications Reviewed  Scheduled Meds:   adenosine        aspirin  81 mg Oral Daily    chlorhexidine  15 mL Mouth/Throat BID    furosemide  40 mg Oral Daily    lisinopriL  20 mg Oral Daily    metoprolol  5 mg Intravenous ED 1 Time    mupirocin   Nasal BID    pantoprazole  40 mg Oral Before breakfast    spironolactone  25 mg Oral Daily     Continuous Infusions:   amiodarone in dextrose 5% 0.5 mg/min (04/15/23 0915)     PRN Meds:.acetaminophen, butalbital-acetaminophen-caffeine -40 mg, LORazepam, melatonin, nitroGLYCERIN, ondansetron, polyethylene glycol, sodium chloride 0.9%    Labs: Pertinent Labs Reviewed  Clinical Chemistry:  Recent Labs   Lab 04/15/23  0135      K 3.9      CO2 22*   GLU 96   BUN 16   CREATININE 0.9   CALCIUM 8.9   PROT 7.4   ALBUMIN 4.1   BILITOT 0.8   ALKPHOS 80   AST 22   ALT 22   ANIONGAP 13   MG 2.0     CBC:   Recent Labs   Lab  04/15/23  0135   WBC 8.73   RBC 4.24*   HGB 13.1*   HCT 39.7*      MCV 94   MCH 30.9   MCHC 33.0     Cardiac Profile:  Recent Labs   Lab 04/15/23  0135   *     Monitor and Evaluation  Food and Nutrient Intake: energy intake, food and beverage intake  Food and Nutrient Adminstration: diet order  Knowledge/Beliefs/Attitudes: food and nutrition knowledge/skill  Physical Activity and Function: nutrition-related ADLs and IADLs  Anthropometric Measurements: weight, weight change  Biochemical Data, Medical Tests and Procedures: electrolyte and renal panel, gastrointestinal profile, glucose/endocrine profile  Nutrition-Focused Physical Findings: overall appearance     Nutrition Risk  Level of Risk/Frequency of Follow-up: high     Nutrition Follow-Up  RD Follow-up?: Yes      Nicolle Manriquez RD 04/15/2023 10:02 AM

## 2023-04-15 NOTE — NURSING
0530- Patient arrived from ED, able to transfer self from stretcher to bed; mild dyspnea on exertion, but recovered quickly; AAOx4; assessment complete, see flowsheet; all invasive lines patent and secure; NSR noted on monitor and pt denies chest pain at this time; side rails up x3 for safety; call light in reach; VSS    0630- Report to Jordyn GOLDEN

## 2023-04-15 NOTE — PROGRESS NOTES
"[facility]  Progress Note    Patient Name: Brandon Rivas  MRN: [id number]  Patient Class: IP- Inpatient  Admission Date: 4/15/2023  Attending Physician: Jair Leon MD  Primary Care Provider: James Garcia MD  DOS: 04/15/2023    Subjective:     Principal Problem:V-tach    Chief Complaint:   Chief Complaint   Patient presents with    Chest Pain     Chest pain x 1 day. States heart rate was in 180's.  Took 4 ntg at home.         HPI: 67-year-old man with CAD status post stenting, hyperlipidemia, hypertension, chronic combined CHF status post AICD, tobacco use, presenting with chest pain and palpitations.  Patient reports last week AICD firing.  He has had intermittent palpitations with chest pain since, and they worsened today.  He measured his heart rate at 180 at home.   He presented to the ED where EKG showed V-tach versus SVT.  He was treated with 5 mg IV metoprolol, 2 g magnesium, and 150 mg amiodarone push.  Cardiology was contacted and he was started on amiodarone infusion.  He has gone in and out of wide complex tachycardia while in the ED with stable blood pressure and mentation.    Patient reports he has been very depressed and stressed about his medical problems and this tends to make his  heart rate increase.  He takes amiodarone but only once daily instead of twice because he is concerned about lung effects.  He is noncompliant with Lasix because it "tears up his insides".  He reports compliance with his metoprolol.  He continues to smoke cigars a few puffs a day.  He no longer uses alcohol.  He was recently treated for a UTI which he states is resolved and he had a urinalysis last week showing clearance.  He denies current chest pain, fever, chills, cough, dizziness, syncope, shortness of breath, vision changes.    Interval history:   4/15:  Patient seen and examined.  Patient very anxious and has pressured speech.  He is perseverating on the fact " that amiodarone is the source of many of his perceived symptoms/side effects.  He is resistant to taking further amiodarone.  He wishes to have his AICD interrogated.  He denies any further episodes of chest pain.  No lightheadedness or syncope.  No nausea or vomiting.  No fever.  Case discussed with nursing.      Vitals:    04/15/23 1415 04/15/23 1430 04/15/23 1445 04/15/23 1500   BP:  124/81  (!) 119/95   BP Location:       Patient Position:       Pulse:  63  65   Resp: 18 (!) 21 18 18   Temp:       TempSrc:       SpO2:  96%  95%   Weight:       Height:           Physical Exam  GENERAL:  Nontoxic nondiaphoretic, appears disheveled  HEENT:  Moist mucous membranes, poor dentition  EYES:  No conjunctival discharge or scleral icterus  LUNGS:  Comfortable work of breathing, lungs are clear bilaterally  CARDIAC:  2+ radial pulses, regular rate and rhythm, AICD left chest  ABDOMEN:  Positive bowel sounds.  Soft, Nontender and nondistended.   SKIN:  Dry and warm with no jaundice  NEUROLOGIC:  Follows commands and answers questions appropriately, fluent speech, nonfocal motor exam  PSYCH:  Pressured speech, mood anxious, perseverates on side effects of amiodarone, somewhat tangential    LABS:  Creatinine 0.9   Troponin normal      Potassium 3.9   Hematocrit 13     Chest x-ray no focal infiltrate      Assessment/Plan:     * V-tach  Still having intermittent episodes.  Admit to ICU.  Continue  Amiodarone drip, Cardiology consult  Possibly triggered by medication noncompliance and stress      Moderate episode of recurrent major depressive disorder  Discussed initiating a low-dose SSRI with him at length.  He is very hesitant about medications, will consider it  Continue his anxiety medication as needed in the meantime      Chronic combined systolic and diastolic heart failure  Grade 3 diastolic dysfunction with EF 34% on last echo.  He has pulmonary edema on exam but refuses any Lasix.      PVD (peripheral vascular  disease)        Tobacco dependence  Smokes a few puffs of cigar daily.  Refuses nicotine patch    Coronary artery disease        Hypertension  Blood pressure elevated.  Resume home oral medications and monitor        Plan update today:  Continue ICU care  Appreciate consultants-cardiology & psychiatry   Continue IV amiodarone infusion.  Potentially wean back to home dosing of amiodarone when stable.  Consider alternatives to amiodarone?  Interrogation of [employer] AICD   Continue diuretics Lasix and Aldactone.  Monitor urine output and fluid status.    Continue medical management including aspirin and ACE inhibitor, titrate regimen as needed  Continue PRN medications for anxiety.  Appreciate any input from Psychiatry.  Likely needs SSRI.  Continue supportive care measures   Continue home medications for chronic issues as able   Mobilize as able   Advance diet as tolerated   Serial labs  GI prophylaxis with PPI  VTE prophylaxis with Lovenox  High risk secondary to severe exacerbation of chronic illness; acute illness with risk to life from ventricular tachycardia the setting of systolic CHF        Jair Leon MD  Department of Hospital Medicine  [facility]

## 2023-04-15 NOTE — CONSULTS
[facility]  Department of Cardiology  Consult Note      PATIENT NAME: Brandon Rivas  MRN: [id number]  TODAY'S DATE: 04/15/2023  ADMIT DATE: 4/15/2023                          CONSULT REQUESTED BY: Jair Leon MD    SUBJECTIVE     PRINCIPAL PROBLEM: V-tach      REASON FOR CONSULT:   Ventricular Tachycardia      HPI:    Patient is a 67-year-old male with CAD s/p stent, hyperlipidemia, hypertension, chronic combined CHF s/p AICD who presented to the ED with chest pain and palpitations.  AICD fired last week with increased palpitations and chest discomfort since.  Heart rate at home was 180.  Patient was found to be in VT versus SVT and was treated with 5 mg of IV metoprolol, 2 g magnesium, and 150 mg amiodarone push.  Amiodarone infusion was initiated.  Patient is anxious during examination.  He was stress about amiodarone as he is concerned it will cause lung problems.  Questionable compliance with home medications.    During examination he was anxious, in sinus rhythm, and on amiodarone drip.  No edema.  No acute distress.            FROM H&P   Chest Pain       Chest pain x 1 day. States heart rate was in 180's.  Took 4 ntg at home.          HPI: 67-year-old man with CAD status post stenting, hyperlipidemia, hypertension, chronic combined CHF status post AICD, tobacco use, presenting with chest pain and palpitations.  Patient reports last week AICD firing.  He has had intermittent palpitations with chest pain since, and they worsened today.  He measured his heart rate at 180 at home.   He presented to the ED where EKG showed V-tach versus SVT.  He was treated with 5 mg IV metoprolol, 2 g magnesium, and 150 mg amiodarone push.  Cardiology was contacted and he was started on amiodarone infusion.  He has gone in and out of wide complex tachycardia while in the ED with stable blood pressure and mentation.    Patient reports he has been very depressed and stressed about his medical problems and  "this tends to make his  heart rate increase.  He takes amiodarone but only once daily instead of twice because he is concerned about lung effects.  He is noncompliant with Lasix because it "tears up his insides".  He reports compliance with his metoprolol.  He continues to smoke cigars a few puffs a day.  He no longer uses alcohol.  He was recently treated for a UTI which he states is resolved and he had a urinalysis last week showing clearance.  He denies current chest pain, fever, chills, cough, dizziness, syncope, shortness of breath, vision changes.         Review of patient's allergies indicates:   Allergen Reactions    Bactrim [sulfamethoxazole-trimethoprim] Shortness Of Breath    Statins-hmg-coa reductase inhibitors Other (See Comments)     Statin intolerance. Muscle weakness    Wellbutrin [bupropion hcl] Other (See Comments)     Abdominal pain        Past Medical History:   Diagnosis Date    Coronary artery disease     Hypertension     MI (myocardial infarction)     Thyroid disease      Past Surgical History:   Procedure Laterality Date    CARDIAC PACEMAKER PLACEMENT       Social History     Tobacco Use    Smoking status: Every Day     Packs/day: 1.00     Years: 40.00     Pack years: 40.00     Types: Cigarettes    Smokeless tobacco: Never   Substance Use Topics    Alcohol use: Yes     Comment: 12 pack beer daily    Drug use: No        REVIEW OF SYSTEMS  CONSTITUTIONAL: Negative for chills, fatigue and fever.   EYES: No double vision, No blurred vision  NEURO: No headaches, No dizziness  RESPIRATORY: Negative for cough, shortness of breath and wheezing.    CARDIOVASCULAR:  Positive for chest pain-improve.  Positive for palpitations-improved and negative leg swelling.   GI: Negative for abdominal pain, No melena, diarrhea, nausea and vomiting.   : Negative for dysuria and frequency, Negative for hematuria  SKIN: Negative for bruising, Negative for edema or discoloration noted.   ENDOCRINE: Negative for " polyphagia, Negative for heat intolerance, Negative for cold intolerance  PSYCHIATRIC: Negative for depression, Negative for anxiety, Negative for memory loss  MUSCULOSKELETAL: Negative for neck pain, Negative for muscle weakness, Negative for back pain     OBJECTIVE     VITAL SIGNS (Most Recent)  Temp: 97.4 °F (36.3 °C) (04/15/23 1501)  Pulse: 62 (04/15/23 1830)  Resp: (!) 22 (04/15/23 1830)  BP: 113/71 (04/15/23 1830)  SpO2: 96 % (04/15/23 1830)    VENTILATION STATUS  Resp: (!) 22 (04/15/23 1830)  SpO2: 96 % (04/15/23 1830)           I & O (Last 24H):  Intake/Output Summary (Last 24 hours) at 4/15/2023 1852  Last data filed at 4/15/2023 1848  Gross per 24 hour   Intake 1018.17 ml   Output 1450 ml   Net -431.83 ml       WEIGHTS  Wt Readings from Last 3 Encounters:   04/15/23 0530 97 kg (213 lb 13.5 oz)   04/15/23 0114 95.3 kg (210 lb)   04/03/23 0954 96.6 kg (213 lb)   03/07/23 1038 94.8 kg (209 lb)       PHYSICAL EXAM  GENERAL: well built, well nourished, well-developed in no apparent distress alert and oriented.   HEENT: Normocephalic. Pupils normal and conjunctivae normal.   NECK: No JVD. No bruit..   THYROID: Thyroid not enlarged. No nodules present..   CARDIAC: Regular rate and rhythm. S1 is normal.S2 is normal.  2/6 systolic murmur  CHEST ANATOMY: normal.   LUNGS: Clear to auscultation. No wheezing or rhonchi..   ABDOMEN: Soft Normal bowel sounds.  Nontender  URINARY: No farrar catheter   EXTREMITIES: No cyanosis, clubbing or edema noted at this time.,  PERIPHERAL VASCULAR SYSTEM: Good palpable distal pulses.   CENTRAL NERVOUS SYSTEM: No focal motor or sensory deficits noted.   SKIN: Skin without lesions, moist, well perfused.   MUSCLE STRENGTH & TONE: No noteable weakness, atrophy or abnormal movement.     HOME MEDICATIONS:  No current facility-administered medications on file prior to encounter.     Current Outpatient Medications on File Prior to Encounter   Medication Sig Dispense Refill    amiodarone  (PACERONE) 200 MG Tab Take 2 tablets (400 mg total) by mouth 2 (two) times daily for 3 days, THEN 1 tablet (200 mg total) 2 (two) times daily. 192 tablet 0    aspirin 81 MG Chew Take 1 tablet (81 mg total) by mouth once daily.  0    butalbital-acetaminophen-caffeine -40 mg (FIORICET, ESGIC) -40 mg per tablet Take 1 tablet by mouth every 4 (four) hours as needed for Headaches. 20 tablet 0    furosemide (LASIX) 40 MG tablet Take 1 tablet (40 mg total) by mouth once daily. 90 tablet 3    lisinopriL (PRINIVIL,ZESTRIL) 20 MG tablet Take 1 tablet (20 mg total) by mouth once daily. 90 tablet 3    LORazepam (ATIVAN) 0.5 MG tablet Take 1 tablet (0.5 mg total) by mouth every 8 (eight) hours as needed for Anxiety. 30 tablet 0    metoprolol succinate (TOPROL-XL) 50 MG 24 hr tablet Take 1 tablet (50 mg total) by mouth once daily. 90 tablet 3    nitroGLYCERIN (NITROSTAT) 0.4 MG SL tablet Place 1 tablet (0.4 mg total) under the tongue every 5 (five) minutes as needed for Chest pain. 25 tablet 6    pantoprazole (PROTONIX) 40 MG tablet Take 40 mg by mouth once daily.      potassium chloride (KLOR-CON) 10 MEQ TbSR Take 1 tablet (10 mEq total) by mouth every other day. 45 tablet 3    spironolactone (ALDACTONE) 25 MG tablet Take 1 tablet (25 mg total) by mouth once daily. 90 tablet 3    tamsulosin (FLOMAX) 0.4 mg Cap Take 1 capsule by mouth once daily.         SCHEDULED MEDS:   aspirin  81 mg Oral Daily    chlorhexidine  15 mL Mouth/Throat BID    enoxparin  40 mg Subcutaneous Q24H    furosemide  40 mg Oral Daily    lisinopriL  20 mg Oral Daily    mupirocin   Nasal BID    nicotine  1 patch Transdermal Daily    pantoprazole  40 mg Oral Before breakfast    spironolactone  25 mg Oral Daily       CONTINUOUS INFUSIONS:   amiodarone in dextrose 5% 0.5 mg/min (04/15/23 1506)       PRN MEDS:acetaminophen, butalbital-acetaminophen-caffeine -40 mg, LORazepam, melatonin, nitroGLYCERIN, ondansetron, polyethylene glycol, sodium  chloride 0.9%    LABS AND DIAGNOSTICS     CBC LAST 3 DAYS  Recent Labs   Lab 04/15/23  0135   WBC 8.73   RBC 4.24*   HGB 13.1*   HCT 39.7*   MCV 94   MCH 30.9   MCHC 33.0   RDW 15.3*      MPV 11.1   GRAN 66.4  5.8   LYMPH 18.6  1.6   MONO 9.7  0.9   BASO 0.08   NRBC 0       COAGULATION LAST 3 DAYS  Recent Labs   Lab 04/15/23  0135   INR 1.2       CHEMISTRY LAST 3 DAYS  Recent Labs   Lab 04/15/23  0135      K 3.9      CO2 22*   ANIONGAP 13   BUN 16   CREATININE 0.9   GLU 96   CALCIUM 8.9   MG 2.0   ALBUMIN 4.1   PROT 7.4   ALKPHOS 80   ALT 22   AST 22   BILITOT 0.8       CARDIAC PROFILE LAST 3 DAYS  Recent Labs   Lab 04/15/23  0135 04/15/23  0518 04/15/23  0902   *  --   --    TROPONINIHS 16.6* 16.9* 14.5       ENDOCRINE LAST 3 DAYS  No results for input(s): TSH, PROCAL in the last 168 hours.    LAST ARTERIAL BLOOD GAS  ABG  No results for input(s): PH, PO2, PCO2, HCO3, BE in the last 168 hours.    LAST 7 DAYS MICROBIOLOGY   Microbiology Results (last 7 days)       ** No results found for the last 168 hours. **            MOST RECENT IMAGING  X-Ray Chest AP Portable  HISTORY: chest pain  and tachycardia.    FINDINGS: Portable chest radiograph at 0206 hours compared to 02/21/2023 shows single-lead left subclavian CCD unchanged in position, with median sternotomy wires and mediastinal surgical clips. The cardiac silhouette is enlarged, stable in size, with normal pulmonary vascularity.    The lungs are normally expanded, with stable mild diffuse interstitial prominence, nonspecific. There is no consolidation, large pleural effusion, or pneumothorax. No acute fractures.    IMPRESSION: No evidence of acute cardiopulmonary disease.    Electronically signed by:  Jose Roberto Salazar MD  4/15/2023 9:50 AM CDT Workstation: [id number]      ECHOCARDIOGRAM RESULTS (last 5)  Results for orders placed during the hospital encounter of 02/21/23    Echo    Interpretation Summary  · The left ventricle is mildly  enlarged with moderate concentric hypertrophy evidence of old apical scar seen on apical four-chamber view  · The estimated ejection fraction is 34%.  · Grade III left ventricular diastolic dysfunction. Mean left atrial pressure 11  · There are segmental left ventricular wall motion abnormalities.  · Atrial fibrillation not observed.  · Normal right ventricular size with mildly reduced right ventricular systolic function.  · Mild-to-moderate aortic regurgitation.  · Severe mitral regurgitation.  · Intermediate central venous pressure (8 mmHg).  · The estimated PA systolic pressure is 30 mmHg. Mild pulmonary hypertension is present    Patient has sinus rhythm      CURRENT/PREVIOUS VISIT EKG  Results for orders placed or performed during the hospital encounter of 04/15/23   EKG 12-lead    Collection Time: 04/15/23  1:48 AM    Narrative    Test Reason : R07.9,    Vent. Rate : 079 BPM     Atrial Rate : 079 BPM     P-R Int : 168 ms          QRS Dur : 112 ms      QT Int : 394 ms       P-R-T Axes : 076 025 215 degrees     QTc Int : 451 ms    Sinus rhythm with Premature atrial complexes  Septal infarct (cited on or before 18-DEC-2016)  T wave abnormality, consider inferior ischemia  Abnormal ECG  When compared with ECG of 15-APR-2023 01:35,  No significant change was found    Referred By: [email or web address]   SELF           Confirmed By:            ASSESSMENT/PLAN:     Active Hospital Problems    Diagnosis    *V-tach    Moderate episode of recurrent major depressive disorder    PVD (peripheral vascular disease)    Chronic combined systolic and diastolic heart failure    Hypertension    Coronary artery disease    Tobacco dependence       ASSESSMENT & PLAN:     Ventricular tachycardia  Major depressive disorder  Hypertension  Chronic combined systolic and diastolic heart failure  CAD      RECOMMENDATIONS:    Patient presented to the ED with ventricular tachycardia versus SVT.  He is noncompliant with medication regimen at  home.  Patient is currently on amiodarone drip.  Continue amiodarone drip for 24 hours and switch to amiodarone 200 mg t.i.d.   Continue cardiac monitoring.  Troponin HS non elevated.  BNP this a.m. 857.  Patient is receiving Lasix 40 mg p.o. daily.  Recommend IV Lasix as needed.  Strict I&Os.  1.5 L fluid restriction.  2 g low-sodium heart healthy diet.  Continue spironolactone.  Recommend compliance with home medication regimen.   Thank you for the consultation.  We will continue to follow.      Shirlene Oconnell NP  Department of Cardiology  Date of Service: 04/15/2023        I have personally interviewed and examined the patient, I have reviewed the Nurse Practitioner's history and physical, assessment, and plan. I agree with the findings and plan.    1. Patient presented with arrhythmias.  Intermittent palpitations.  Currently on amiodarone drip continue the same.    Patient had been noncompliant and had been cutting back on amiodarone at home.  Had a lengthy discussion with patient in regards with the importance of taking amiodarone on regular basis.  However patient is not convinced that amiodarone would help him.  And that it would hurt him.  2. His arrhythmias have subsided significantly since the start of amiodarone.  3. Will re-evaluate his defibrillator on Monday.    4. Lasix 40 mg IV daily.  Replace potassium and magnesium.      Timothy John M.D.  Department of Cardiology  Date of Service: 04/15/2023  6:52 PM

## 2023-04-15 NOTE — ASSESSMENT & PLAN NOTE
Still having intermittent episodes.  Admit to ICU.  Continue  Amiodarone drip, Cardiology consult  Possibly triggered by medication noncompliance and stress

## 2023-04-15 NOTE — PLAN OF CARE
[facility]  Initial Discharge Assessment       Primary Care Provider: James Garcia MD    Admission Diagnosis: V-tach [I47.20]    Admission Date: 4/15/2023  Expected Discharge Date:     Discharge Barriers Identified: None     Information verified as correct on facesheet. Patient denies HD (dialysis) and coumadin. Patient reports being active with [facility] and would like to resume HH services with their agency on discharge. Patient reports independence in ADLs. Discharge plan is home with resumption of Home Health. Brother to provide transport on discharge. Patient denies any discharge needs at this time. CM following.      Payor: MEDICARE / Plan: MEDICARE PART A & B / Product Type: Government /     Extended Emergency Contact Information  Primary Emergency Contact: EvelynJerzy  Mobile Phone: [phone]  Relation: Brother  Secondary Emergency Contact: Josefina Rivas  Mobile Phone: [phone]  Relation: Sister   needed? No    Discharge Plan A: Home, Home Health  Discharge Plan B: Home, Home Health      [facility] - [place], [place] - [address]  [address] [address]  Phone: [phone] Fax: [phone]    [facility] - [place], [place] - [address]  [address] [address]  Phone: [phone] Fax: [phone]      Initial Assessment (most recent)       Adult Discharge Assessment - 04/15/23 0927          Discharge Assessment    Assessment Type Discharge Planning Assessment     Confirmed/corrected address, phone number and insurance Yes     Confirmed Demographics Correct on Facesheet     Source of Information health record;patient     Communicated LARY with patient/caregiver Date not available/Unable to determine     Reason For Admission chest palpitations     People in Home alone     Facility Arrived From: home     Do you expect to return to your current living situation? Yes     Do you have help at  home or someone to help you manage your care at home? Yes     Who are your caregiver(s) and their phone number(s)? Jerzy Rivas (Brother)   [phone]     Prior to hospitilization cognitive status: Alert/Oriented     Current cognitive status: Alert/Oriented     Equipment Currently Used at Home none     Readmission within 30 days? No     Patient currently being followed by outpatient case management? No     Do you currently have service(s) that help you manage your care at home? Yes     Name and Contact number of agency [facility]     Is the pt/caregiver preference to resume services with current agency Yes     Do you take prescription medications? Yes     Do you have prescription coverage? Yes     Coverage Medicare A&B     Do you have any problems affording any of your prescribed medications? No     Is the patient taking medications as prescribed? yes     Who is going to help you get home at discharge? Jerzy Rivas (Brother)   [phone]     How do you get to doctors appointments? car, drives self     Are you on dialysis? No     Do you take coumadin? No     Discharge Plan A Home;Home Health     Discharge Plan B Home;Home Health     DME Needed Upon Discharge  none     Discharge Barriers Identified None

## 2023-04-15 NOTE — PLAN OF CARE
"POC reviewed with patient. Alert and oriented. Amiodarone drip continued. Sinus rhythm/sinus michelle on monitor. Seen by Dr. LEIGHTON John. [employer] report on front of paper chart. Appetite good. Voids to urinal. Anxiety all shift regarding his health, medications, home life, and dying. Ativan given. Nicotine patch ordered. Brother came to visit and reports that patient "has had psychiatric problems for years now but has never received help." "He can be aggressive, anxious,  and everything is focused on him." Dr. Corbin consulted. Patient states, " I do not need a shrink, I already know something is not right with my computer." Safety measures in place and bed alarm in use.     Problem: Adult Inpatient Plan of Care  Goal: Plan of Care Review  Outcome: Ongoing, Progressing     Problem: Adult Inpatient Plan of Care  Goal: Optimal Comfort and Wellbeing  Outcome: Ongoing, Progressing     Problem: Fall Injury Risk  Goal: Absence of Fall and Fall-Related Injury  Outcome: Ongoing, Progressing     Problem: Oral Intake Inadequate  Goal: Improved Oral Intake  Outcome: Ongoing, Progressing     Problem: Thought Process Alteration  Goal: Optimal Thought Clarity  Outcome: Ongoing, Progressing     Problem: Dysrhythmia  Goal: Normalized Cardiac Rhythm  Outcome: Ongoing, Progressing     "

## 2023-04-15 NOTE — PLAN OF CARE
Problem: Oral Intake Inadequate  Goal: Improved Oral Intake  Outcome: Ongoing, Progressing  Intervention: Promote and Optimize Oral Intake  Flowsheets (Taken 4/15/2023 1003)  Oral Nutrition Promotion: other (see comments)     Recommendations  1.) Advance diet when medically appropriate to cardiac.   2.) Suggest MVI for general health.   3.) RD to monitor diet advancement, intake, weight, labs and provide ongoing recommendations and interventions.     Goals:   1.) Diet to advance within 48hr.  Nutrition Goal Status: new

## 2023-04-16 ENCOUNTER — HOSPITAL ENCOUNTER (INPATIENT)
Facility: HOSPITAL | Age: 68
LOS: 5 days | Discharge: ANOTHER HOSPITAL | DRG: 309 | End: 2023-04-22
Attending: EMERGENCY MEDICINE | Admitting: INTERNAL MEDICINE
Payer: MEDICARE

## 2023-04-16 VITALS
WEIGHT: 213.88 LBS | HEART RATE: 57 BPM | OXYGEN SATURATION: 91 % | RESPIRATION RATE: 23 BRPM | DIASTOLIC BLOOD PRESSURE: 67 MMHG | BODY MASS INDEX: 28.34 KG/M2 | HEIGHT: 73 IN | SYSTOLIC BLOOD PRESSURE: 113 MMHG | TEMPERATURE: 97 F

## 2023-04-16 DIAGNOSIS — R94.31 ABNORMAL ELECTROCARDIOGRAM (ECG) (EKG): ICD-10-CM

## 2023-04-16 DIAGNOSIS — R94.31 NONSPECIFIC ABNORMAL ELECTROCARDIOGRAM (ECG) (EKG): ICD-10-CM

## 2023-04-16 DIAGNOSIS — I47.20 VENTRICULAR TACHYCARDIA: ICD-10-CM

## 2023-04-16 DIAGNOSIS — I47.20 V-TACH: Primary | ICD-10-CM

## 2023-04-16 DIAGNOSIS — R07.9 CHEST PAIN: ICD-10-CM

## 2023-04-16 DIAGNOSIS — I50.42 CHRONIC COMBINED SYSTOLIC AND DIASTOLIC HEART FAILURE: ICD-10-CM

## 2023-04-16 DIAGNOSIS — F41.9 ANXIETY: ICD-10-CM

## 2023-04-16 LAB
ALBUMIN SERPL BCP-MCNC: 3.7 G/DL (ref 3.5–5.2)
ALP SERPL-CCNC: 75 U/L (ref 55–135)
ALT SERPL W/O P-5'-P-CCNC: 21 U/L (ref 10–44)
ANION GAP SERPL CALC-SCNC: 8 MMOL/L (ref 8–16)
ANION GAP SERPL CALC-SCNC: 9 MMOL/L (ref 8–16)
APAP SERPL-MCNC: <10 UG/ML (ref 10–20)
AST SERPL-CCNC: 24 U/L (ref 10–40)
BASOPHILS # BLD AUTO: 0.08 K/UL (ref 0–0.2)
BASOPHILS NFR BLD: 1 % (ref 0–1.9)
BILIRUB SERPL-MCNC: 0.6 MG/DL (ref 0.1–1)
BNP SERPL-MCNC: 433 PG/ML (ref 0–99)
BUN SERPL-MCNC: 13 MG/DL (ref 8–23)
BUN SERPL-MCNC: 15 MG/DL (ref 8–23)
CALCIUM SERPL-MCNC: 8.8 MG/DL (ref 8.7–10.5)
CALCIUM SERPL-MCNC: 8.9 MG/DL (ref 8.7–10.5)
CHLORIDE SERPL-SCNC: 106 MMOL/L (ref 95–110)
CHLORIDE SERPL-SCNC: 108 MMOL/L (ref 95–110)
CO2 SERPL-SCNC: 19 MMOL/L (ref 23–29)
CO2 SERPL-SCNC: 22 MMOL/L (ref 23–29)
CREAT SERPL-MCNC: 0.6 MG/DL (ref 0.5–1.4)
CREAT SERPL-MCNC: 0.8 MG/DL (ref 0.5–1.4)
DIFFERENTIAL METHOD: ABNORMAL
EOSINOPHIL # BLD AUTO: 0.3 K/UL (ref 0–0.5)
EOSINOPHIL NFR BLD: 3.3 % (ref 0–8)
ERYTHROCYTE [DISTWIDTH] IN BLOOD BY AUTOMATED COUNT: 15.3 % (ref 11.5–14.5)
ERYTHROCYTE [DISTWIDTH] IN BLOOD BY AUTOMATED COUNT: 15.3 % (ref 11.5–14.5)
EST. GFR  (NO RACE VARIABLE): >60 ML/MIN/1.73 M^2
EST. GFR  (NO RACE VARIABLE): >60 ML/MIN/1.73 M^2
ETHANOL SERPL-MCNC: <5 MG/DL
GLUCOSE SERPL-MCNC: 106 MG/DL (ref 70–110)
GLUCOSE SERPL-MCNC: 89 MG/DL (ref 70–110)
HCT VFR BLD AUTO: 38.6 % (ref 40–54)
HCT VFR BLD AUTO: 40 % (ref 40–54)
HGB BLD-MCNC: 12.5 G/DL (ref 14–18)
HGB BLD-MCNC: 13.2 G/DL (ref 14–18)
IMM GRANULOCYTES # BLD AUTO: 0.02 K/UL (ref 0–0.04)
IMM GRANULOCYTES NFR BLD AUTO: 0.2 % (ref 0–0.5)
LYMPHOCYTES # BLD AUTO: 1.3 K/UL (ref 1–4.8)
LYMPHOCYTES NFR BLD: 16.6 % (ref 18–48)
MAGNESIUM SERPL-MCNC: 2 MG/DL (ref 1.6–2.6)
MAGNESIUM SERPL-MCNC: 2.2 MG/DL (ref 1.6–2.6)
MCH RBC QN AUTO: 30.7 PG (ref 27–31)
MCH RBC QN AUTO: 31 PG (ref 27–31)
MCHC RBC AUTO-ENTMCNC: 32.4 G/DL (ref 32–36)
MCHC RBC AUTO-ENTMCNC: 33 G/DL (ref 32–36)
MCV RBC AUTO: 94 FL (ref 82–98)
MCV RBC AUTO: 95 FL (ref 82–98)
MONOCYTES # BLD AUTO: 0.8 K/UL (ref 0.3–1)
MONOCYTES NFR BLD: 9.8 % (ref 4–15)
NEUTROPHILS # BLD AUTO: 5.6 K/UL (ref 1.8–7.7)
NEUTROPHILS NFR BLD: 69.1 % (ref 38–73)
NRBC BLD-RTO: 0 /100 WBC
PLATELET # BLD AUTO: 187 K/UL (ref 150–450)
PLATELET # BLD AUTO: 190 K/UL (ref 150–450)
PMV BLD AUTO: 11.1 FL (ref 9.2–12.9)
PMV BLD AUTO: 11.5 FL (ref 9.2–12.9)
POTASSIUM SERPL-SCNC: 4.1 MMOL/L (ref 3.5–5.1)
POTASSIUM SERPL-SCNC: 4.3 MMOL/L (ref 3.5–5.1)
PROT SERPL-MCNC: 7.3 G/DL (ref 6–8.4)
RBC # BLD AUTO: 4.07 M/UL (ref 4.6–6.2)
RBC # BLD AUTO: 4.26 M/UL (ref 4.6–6.2)
SALICYLATES SERPL-MCNC: <4 MG/DL (ref 15–30)
SODIUM SERPL-SCNC: 135 MMOL/L (ref 136–145)
SODIUM SERPL-SCNC: 137 MMOL/L (ref 136–145)
T4 FREE SERPL-MCNC: 0.83 NG/DL (ref 0.71–1.51)
TROPONIN I SERPL HS-MCNC: 11.3 PG/ML (ref 0–14.9)
TROPONIN I SERPL HS-MCNC: 11.6 PG/ML (ref 0–14.9)
TSH SERPL DL<=0.005 MIU/L-ACNC: 8.44 UIU/ML (ref 0.34–5.6)
WBC # BLD AUTO: 6.6 K/UL (ref 3.9–12.7)
WBC # BLD AUTO: 8.07 K/UL (ref 3.9–12.7)

## 2023-04-16 PROCEDURE — 63600175 PHARM REV CODE 636 W HCPCS: Performed by: EMERGENCY MEDICINE

## 2023-04-16 PROCEDURE — 99233 SBSQ HOSP IP/OBS HIGH 50: CPT | Mod: ,,, | Performed by: INTERNAL MEDICINE

## 2023-04-16 PROCEDURE — 82077 ASSAY SPEC XCP UR&BREATH IA: CPT | Performed by: EMERGENCY MEDICINE

## 2023-04-16 PROCEDURE — 93005 ELECTROCARDIOGRAM TRACING: CPT | Performed by: SPECIALIST

## 2023-04-16 PROCEDURE — 80048 BASIC METABOLIC PNL TOTAL CA: CPT | Performed by: HOSPITALIST

## 2023-04-16 PROCEDURE — 25000003 PHARM REV CODE 250

## 2023-04-16 PROCEDURE — 83880 ASSAY OF NATRIURETIC PEPTIDE: CPT | Performed by: EMERGENCY MEDICINE

## 2023-04-16 PROCEDURE — G0378 HOSPITAL OBSERVATION PER HR: HCPCS

## 2023-04-16 PROCEDURE — 84443 ASSAY THYROID STIM HORMONE: CPT | Performed by: EMERGENCY MEDICINE

## 2023-04-16 PROCEDURE — 84439 ASSAY OF FREE THYROXINE: CPT | Performed by: EMERGENCY MEDICINE

## 2023-04-16 PROCEDURE — 96366 THER/PROPH/DIAG IV INF ADDON: CPT

## 2023-04-16 PROCEDURE — 94761 N-INVAS EAR/PLS OXIMETRY MLT: CPT

## 2023-04-16 PROCEDURE — 93010 EKG 12-LEAD: ICD-10-PCS | Mod: ,,, | Performed by: SPECIALIST

## 2023-04-16 PROCEDURE — 83735 ASSAY OF MAGNESIUM: CPT | Performed by: HOSPITALIST

## 2023-04-16 PROCEDURE — 80179 DRUG ASSAY SALICYLATE: CPT | Performed by: EMERGENCY MEDICINE

## 2023-04-16 PROCEDURE — 83735 ASSAY OF MAGNESIUM: CPT | Mod: 91 | Performed by: EMERGENCY MEDICINE

## 2023-04-16 PROCEDURE — 99285 EMERGENCY DEPT VISIT HI MDM: CPT | Mod: 25

## 2023-04-16 PROCEDURE — 80053 COMPREHEN METABOLIC PANEL: CPT | Performed by: EMERGENCY MEDICINE

## 2023-04-16 PROCEDURE — 25000003 PHARM REV CODE 250: Performed by: INTERNAL MEDICINE

## 2023-04-16 PROCEDURE — 63600175 PHARM REV CODE 636 W HCPCS: Performed by: INTERNAL MEDICINE

## 2023-04-16 PROCEDURE — 99233 PR SUBSEQUENT HOSPITAL CARE,LEVL III: ICD-10-PCS | Mod: ,,, | Performed by: INTERNAL MEDICINE

## 2023-04-16 PROCEDURE — 84484 ASSAY OF TROPONIN QUANT: CPT | Performed by: EMERGENCY MEDICINE

## 2023-04-16 PROCEDURE — 96372 THER/PROPH/DIAG INJ SC/IM: CPT | Performed by: INTERNAL MEDICINE

## 2023-04-16 PROCEDURE — 96365 THER/PROPH/DIAG IV INF INIT: CPT

## 2023-04-16 PROCEDURE — 85027 COMPLETE CBC AUTOMATED: CPT | Performed by: HOSPITALIST

## 2023-04-16 PROCEDURE — 99900031 HC PATIENT EDUCATION (STAT)

## 2023-04-16 PROCEDURE — 96367 TX/PROPH/DG ADDL SEQ IV INF: CPT

## 2023-04-16 PROCEDURE — 25000003 PHARM REV CODE 250: Performed by: HOSPITALIST

## 2023-04-16 PROCEDURE — 36415 COLL VENOUS BLD VENIPUNCTURE: CPT | Performed by: HOSPITALIST

## 2023-04-16 PROCEDURE — 80143 DRUG ASSAY ACETAMINOPHEN: CPT | Performed by: EMERGENCY MEDICINE

## 2023-04-16 PROCEDURE — 99900035 HC TECH TIME PER 15 MIN (STAT)

## 2023-04-16 PROCEDURE — 85025 COMPLETE CBC W/AUTO DIFF WBC: CPT | Performed by: EMERGENCY MEDICINE

## 2023-04-16 PROCEDURE — 36415 COLL VENOUS BLD VENIPUNCTURE: CPT | Performed by: EMERGENCY MEDICINE

## 2023-04-16 PROCEDURE — 93010 ELECTROCARDIOGRAM REPORT: CPT | Mod: ,,, | Performed by: SPECIALIST

## 2023-04-16 RX ORDER — ONDANSETRON 2 MG/ML
4 INJECTION INTRAMUSCULAR; INTRAVENOUS EVERY 8 HOURS PRN
Status: DISCONTINUED | OUTPATIENT
Start: 2023-04-16 | End: 2023-04-22 | Stop reason: HOSPADM

## 2023-04-16 RX ORDER — MAGNESIUM SULFATE HEPTAHYDRATE 40 MG/ML
2 INJECTION, SOLUTION INTRAVENOUS ONCE
Status: COMPLETED | OUTPATIENT
Start: 2023-04-16 | End: 2023-04-16

## 2023-04-16 RX ORDER — POLYETHYLENE GLYCOL 3350 17 G/17G
17 POWDER, FOR SOLUTION ORAL 2 TIMES DAILY PRN
Status: DISCONTINUED | OUTPATIENT
Start: 2023-04-16 | End: 2023-04-22 | Stop reason: HOSPADM

## 2023-04-16 RX ORDER — METOPROLOL TARTRATE 1 MG/ML
5 INJECTION, SOLUTION INTRAVENOUS
Status: DISPENSED | OUTPATIENT
Start: 2023-04-16 | End: 2023-04-17

## 2023-04-16 RX ORDER — LANOLIN ALCOHOL/MO/W.PET/CERES
800 CREAM (GRAM) TOPICAL
Status: DISCONTINUED | OUTPATIENT
Start: 2023-04-16 | End: 2023-04-22 | Stop reason: HOSPADM

## 2023-04-16 RX ORDER — TALC
9 POWDER (GRAM) TOPICAL NIGHTLY PRN
Status: DISCONTINUED | OUTPATIENT
Start: 2023-04-16 | End: 2023-04-22 | Stop reason: HOSPADM

## 2023-04-16 RX ORDER — ACETAMINOPHEN 325 MG/1
650 TABLET ORAL EVERY 4 HOURS PRN
Status: DISCONTINUED | OUTPATIENT
Start: 2023-04-16 | End: 2023-04-22 | Stop reason: HOSPADM

## 2023-04-16 RX ORDER — AMIODARONE HYDROCHLORIDE 200 MG/1
200 TABLET ORAL 3 TIMES DAILY
Status: DISCONTINUED | OUTPATIENT
Start: 2023-04-16 | End: 2023-04-16 | Stop reason: HOSPADM

## 2023-04-16 RX ORDER — ENOXAPARIN SODIUM 100 MG/ML
40 INJECTION SUBCUTANEOUS EVERY 24 HOURS
Status: DISCONTINUED | OUTPATIENT
Start: 2023-04-16 | End: 2023-04-22 | Stop reason: HOSPADM

## 2023-04-16 RX ORDER — LORAZEPAM 0.5 MG/1
0.5 TABLET ORAL EVERY 8 HOURS PRN
Status: CANCELLED | OUTPATIENT
Start: 2023-04-16

## 2023-04-16 RX ORDER — AMIODARONE HYDROCHLORIDE 200 MG/1
200 TABLET ORAL 3 TIMES DAILY
Qty: 90 TABLET | Refills: 11 | Status: ON HOLD | OUTPATIENT
Start: 2023-04-16 | End: 2023-04-25 | Stop reason: HOSPADM

## 2023-04-16 RX ORDER — LORAZEPAM 0.5 MG/1
0.5 TABLET ORAL EVERY 6 HOURS PRN
Status: DISCONTINUED | OUTPATIENT
Start: 2023-04-16 | End: 2023-04-22 | Stop reason: HOSPADM

## 2023-04-16 RX ADMIN — ASPIRIN 81 MG 81 MG: 81 TABLET ORAL at 08:04

## 2023-04-16 RX ADMIN — MUPIROCIN 1 G: 20 OINTMENT TOPICAL at 08:04

## 2023-04-16 RX ADMIN — MAGNESIUM SULFATE HEPTAHYDRATE 2 G: 40 INJECTION, SOLUTION INTRAVENOUS at 02:04

## 2023-04-16 RX ADMIN — AMIODARONE HYDROCHLORIDE 0.5 MG/MIN: 1.8 INJECTION, SOLUTION INTRAVENOUS at 02:04

## 2023-04-16 RX ADMIN — LORAZEPAM 0.5 MG: 0.5 TABLET ORAL at 07:04

## 2023-04-16 RX ADMIN — LORAZEPAM 0.5 MG: 0.5 TABLET ORAL at 08:04

## 2023-04-16 RX ADMIN — ACETAMINOPHEN 650 MG: 325 TABLET ORAL at 11:04

## 2023-04-16 RX ADMIN — MELATONIN 9 MG: at 08:04

## 2023-04-16 RX ADMIN — AMIODARONE HYDROCHLORIDE 1 MG/MIN: 1.8 INJECTION, SOLUTION INTRAVENOUS at 04:04

## 2023-04-16 RX ADMIN — CHLORHEXIDINE GLUCONATE 15 ML: 1.2 RINSE ORAL at 08:04

## 2023-04-16 RX ADMIN — AMIODARONE HYDROCHLORIDE 1 MG/MIN: 1.8 INJECTION, SOLUTION INTRAVENOUS at 11:04

## 2023-04-16 RX ADMIN — ACETAMINOPHEN 650 MG: 325 TABLET ORAL at 07:04

## 2023-04-16 RX ADMIN — ENOXAPARIN SODIUM 40 MG: 40 INJECTION SUBCUTANEOUS at 05:04

## 2023-04-16 RX ADMIN — ACETAMINOPHEN 650 MG: 325 TABLET ORAL at 03:04

## 2023-04-16 NOTE — DISCHARGE SUMMARY
"[facility] Medicine  Discharge Summary      Patient Name: Brandon Rivas  MRN: [id number]  OPAL: [id number]  Patient Class: IP- Inpatient  Admission Date: 4/15/2023  Hospital Length of Stay: 1 days  Discharge Date and Time:  04/16/2023 11:43 AM  Attending Physician: Jair Leon MD   Discharging Provider: Jair Leon MD  Primary Care Provider: James Garcia MD    Primary Care Team: Networked reference to record PCT     HPI:   67-year-old man with CAD status post stenting, hyperlipidemia, hypertension, chronic combined CHF status post AICD, tobacco use, presenting with chest pain and palpitations.  Patient reports last week AICD firing.  He has had intermittent palpitations with chest pain since, and they worsened today.  He measured his heart rate at 180 at home.   He presented to the ED where EKG showed V-tach versus SVT.  He was treated with 5 mg IV metoprolol, 2 g magnesium, and 150 mg amiodarone push.  Cardiology was contacted and he was started on amiodarone infusion.  He has gone in and out of wide complex tachycardia while in the ED with stable blood pressure and mentation.    Patient reports he has been very depressed and stressed about his medical problems and this tends to make his  heart rate increase.  He takes amiodarone but only once daily instead of twice because he is concerned about lung effects.  He is noncompliant with Lasix because it "tears up his insides".  He reports compliance with his metoprolol.  He continues to smoke cigars a few puffs a day.  He no longer uses alcohol.  He was recently treated for a UTI which he states is resolved and he had a urinalysis last week showing clearance.  He denies current chest pain, fever, chills, cough, dizziness, syncope, shortness of breath, vision changes.    Hospital Course:   The patient was given IV amiodarone infusion for ventricular tachycardia which improved without recurrence.  The patient had " interrogation of AICD which did not reveal any arrhythmias since last interrogation.  The patient's symptoms improved and clinically the patient improved.  The patient was transitioned to oral amiodarone although the patient was very resistant to taking amiodarone 3 times daily.  During hospitalization he was intermittently refusing medications.  Psychiatry consultation was ordered but patient was discharged prior to being seen by Psychiatry. He agreed to take amiodarone and subsequently was discharged home.    Examination at time of discharge patient appeared comfortable appearing, nontoxic, nondiaphoretic.  Comfortable work of breathing with clear lungs bilaterally.  2+ radial pulses.  Patient follows commands and answers questions appropriately.  Ambulating independently without difficulty.    Goals of Care Treatment Preferences:  Code Status: Full Code      Consults:   Consults (From admission, onward)          Status Ordering Provider     Inpatient consult to Psychiatry  Once        Provider:  Sang Coleman MD    Acknowledged HINA CONNELL     Inpatient consult to Cardiology  Once        Provider:  Timothy John MD    Completed GEE MITCHELL            No new Assessment & Plan notes have been filed under this hospital service since the last note was generated.  Service: Hospital Medicine    Final Active Diagnoses:    Diagnosis Date Noted POA    PRINCIPAL PROBLEM:  V-tach [I47.20] 02/24/2023 Yes    Moderate episode of recurrent major depressive disorder [F33.1] 12/21/2016 Yes    PVD (peripheral vascular disease) [I73.9] 12/18/2016 Yes    Chronic combined systolic and diastolic heart failure [I50.42] 12/18/2016 Yes    Hypertension [I10] 12/18/2016 Yes    Coronary artery disease [I25.10] 12/18/2016 Yes    Tobacco dependence [F17.200] 12/18/2016 Yes      Problems Resolved During this Admission:       Discharged Condition: stable    Disposition: Home or Self Care    Follow Up:   Follow-up  Information       James Garcia MD Follow up in 1 week(s).    Specialties: Internal Medicine, Hospice Services, Home Health Services  Contact information:  [place] [address]  [phone]               Timothy John MD Follow up in 2 week(s).    Specialties: Interventional Cardiology, Cardiology, Cardiovascular Disease  Contact information:  [address] [facility]  [address]  [place] [address]  [phone]                           Patient Instructions:      Ambulatory referral/consult to Smoking Cessation Program   Standing Status: Future   Referral Priority: Routine Referral Type: Consultation   Referral Reason: Specialty Services Required   Requested Specialty: CTTS   Number of Visits Requested: 1     Diet Cardiac     Notify your health care provider if you experience any of the following:  temperature >100.4     Notify your health care provider if you experience any of the following:  persistent nausea and vomiting or diarrhea     Notify your health care provider if you experience any of the following:  severe uncontrolled pain     Notify your health care provider if you experience any of the following:  persistent dizziness, light-headedness, or visual disturbances     Reason for not Prescribing Nicotine Replacement     Order Specific Question Answer Comments   Reason for not Prescribing: Not medically appropriate at this time      Activity as tolerated       Pending Diagnostic Studies:       None           Medications:  Reconciled Home Medications:      Medication List        CHANGE how you take these medications      amiodarone 200 MG Tab  Commonly known as: PACERONE  Take 1 tablet (200 mg total) by mouth 3 (three) times daily.  What changed: See the new instructions.            CONTINUE taking these medications      aspirin 81 MG Chew  Take 1 tablet (81 mg total) by mouth once daily.     butalbital-acetaminophen-caffeine -40 mg -40 mg per tablet  Commonly known as:  FIORICET, ESGIC  Take 1 tablet by mouth every 4 (four) hours as needed for Headaches.     furosemide 40 MG tablet  Commonly known as: LASIX  Take 1 tablet (40 mg total) by mouth once daily.     lisinopriL 20 MG tablet  Commonly known as: PRINIVIL,ZESTRIL  Take 1 tablet (20 mg total) by mouth once daily.     LORazepam 0.5 MG tablet  Commonly known as: ATIVAN  Take 1 tablet (0.5 mg total) by mouth every 8 (eight) hours as needed for Anxiety.     metoprolol succinate 50 MG 24 hr tablet  Commonly known as: TOPROL-XL  Take 1 tablet (50 mg total) by mouth once daily.     nitroGLYCERIN 0.4 MG SL tablet  Commonly known as: NITROSTAT  Place 1 tablet (0.4 mg total) under the tongue every 5 (five) minutes as needed for Chest pain.     pantoprazole 40 MG tablet  Commonly known as: PROTONIX  Take 40 mg by mouth once daily.     potassium chloride 10 MEQ Tbsr  Commonly known as: KLOR-CON  Take 1 tablet (10 mEq total) by mouth every other day.     spironolactone 25 MG tablet  Commonly known as: ALDACTONE  Take 1 tablet (25 mg total) by mouth once daily.     tamsulosin 0.4 mg Cap  Commonly known as: FLOMAX  Take 1 capsule by mouth once daily.              Indwelling Lines/Drains at time of discharge:   Lines/Drains/Airways       None                   Time spent on the discharge of patient: 32 minutes         Jair Leon MD  Department of Hospital Medicine  [facility]

## 2023-04-16 NOTE — PLAN OF CARE
Problem: Adult Inpatient Plan of Care  Goal: Plan of Care Review  Outcome: Ongoing, Progressing  Goal: Patient-Specific Goal (Individualized)  Outcome: Ongoing, Progressing  Goal: Absence of Hospital-Acquired Illness or Injury  Outcome: Ongoing, Progressing  Goal: Optimal Comfort and Wellbeing  Outcome: Ongoing, Progressing  Goal: Readiness for Transition of Care  Outcome: Ongoing, Progressing     Problem: Fall Injury Risk  Goal: Absence of Fall and Fall-Related Injury  Outcome: Ongoing, Progressing     Problem: Oral Intake Inadequate  Goal: Improved Oral Intake  Outcome: Ongoing, Progressing     Problem: Thought Process Alteration  Goal: Optimal Thought Clarity  Outcome: Ongoing, Progressing     Problem: Dysrhythmia  Goal: Normalized Cardiac Rhythm  Outcome: Ongoing, Progressing     Problem: Anxiety  Goal: Anxiety Reduction or Resolution  Outcome: Ongoing, Progressing

## 2023-04-16 NOTE — NURSING
"Pt requested to be disconnected so he could use the restroom and "clean off". Pt requested not to have the yellow fall socks.   "

## 2023-04-16 NOTE — ED PROVIDER NOTES
Encounter Date: 4/16/2023       History     Chief Complaint   Patient presents with    Shortness of Breath     Pt is complaining of feeling like his heart was racing and he is SOB. Started 15 minutes ago     67-year-old man with known coronary artery disease multivessel stents, hyperlipidemia, hypertension, chronic combined CHF status post AICD, tobacco use, with recent hospitalization for evaluation for ventricular tachycardia.  Patient had reported on last hospitalization stents AICD had fired.  During this hospitalization patient found with wide complex tachycardia.  No evidence of DVT was noted however.  Patient after recent discharge from hospital approximately 1 hour later per re-presented to the emergency department with complaint of persistent shortness of breath, palpitations and impending feelings of doom.  Patient emergency department had nonsustained episode of VT which was documented.  Patient symptoms lasted approximately 5-10 minutes.  Currently patient seems anxious however chest pain did resolve and denies any other constitutional symptoms.    Review of patient's allergies indicates:   Allergen Reactions    Bactrim [sulfamethoxazole-trimethoprim] Shortness Of Breath    Statins-hmg-coa reductase inhibitors Other (See Comments)     Statin intolerance. Muscle weakness    Wellbutrin [bupropion hcl] Other (See Comments)     Abdominal pain      Past Medical History:   Diagnosis Date    Coronary artery disease     Hypertension     MI (myocardial infarction)     Thyroid disease      Past Surgical History:   Procedure Laterality Date    CARDIAC PACEMAKER PLACEMENT       Family History   Problem Relation Age of Onset    Fibromyalgia Mother     Heart disease Father     Hyperlipidemia Father      Social History     Tobacco Use    Smoking status: Every Day     Packs/day: 1.00     Years: 40.00     Pack years: 40.00     Types: Cigarettes    Smokeless tobacco: Never   Substance Use Topics    Alcohol use: Yes      Comment: 12 pack beer daily    Drug use: No     Review of Systems   Constitutional:  Negative for fever.   HENT:  Negative for sore throat.    Respiratory:  Positive for shortness of breath.    Cardiovascular:  Positive for chest pain and palpitations.   Gastrointestinal:  Positive for nausea. Negative for abdominal pain and vomiting.   Genitourinary:  Negative for dysuria.   Musculoskeletal:  Negative for back pain.   Skin:  Negative for rash.   Neurological:  Negative for weakness.   Hematological:  Does not bruise/bleed easily.     Physical Exam     Initial Vitals [04/16/23 1332]   BP Pulse Resp Temp SpO2   137/83 70 (!) 22 98.6 °F (37 °C) 98 %      MAP       --         Physical Exam    Nursing note and vitals reviewed.  Constitutional: He appears well-developed and well-nourished.   Anxious, with mild shortness of breath   HENT:   Head: Normocephalic and atraumatic.   Nose: Nose normal.   Mouth/Throat: Oropharynx is clear and moist.   Eyes: Conjunctivae and EOM are normal. Pupils are equal, round, and reactive to light. No scleral icterus.   Neck: Neck supple.   Normal range of motion.  Cardiovascular:  Normal rate, regular rhythm, normal heart sounds and intact distal pulses.     Exam reveals no gallop and no friction rub.       No murmur heard.  Pulmonary/Chest: No stridor. No respiratory distress.   Course bilateral breath sounds no adventitious sounds   Abdominal: Abdomen is soft. Bowel sounds are normal. He exhibits no mass. There is no abdominal tenderness. There is no rebound and no guarding.   Musculoskeletal:         General: No edema. Normal range of motion.      Cervical back: Normal range of motion and neck supple.     Lymphadenopathy:     He has no cervical adenopathy.   Neurological: He is alert and oriented to person, place, and time. He has normal strength and normal reflexes. No cranial nerve deficit or sensory deficit. GCS score is 15. GCS eye subscore is 4. GCS verbal subscore is 5. GCS motor  subscore is 6.   Skin: Skin is warm and dry. Capillary refill takes less than 2 seconds. No rash noted.   Psychiatric: His behavior is normal. Judgment and thought content normal.   Anxiety       ED Course   Procedures  Labs Reviewed   CBC W/ AUTO DIFFERENTIAL - Abnormal; Notable for the following components:       Result Value    RBC 4.26 (*)     Hemoglobin 13.2 (*)     RDW 15.3 (*)     Lymph % 16.6 (*)     All other components within normal limits   COMPREHENSIVE METABOLIC PANEL - Abnormal; Notable for the following components:    CO2 22 (*)     All other components within normal limits   B-TYPE NATRIURETIC PEPTIDE - Abnormal; Notable for the following components:     (*)     All other components within normal limits   TSH - Abnormal; Notable for the following components:    TSH 8.440 (*)     All other components within normal limits   TROPONIN I HIGH SENSITIVITY   MAGNESIUM   ALCOHOL,MEDICAL (ETHANOL)   SALICYLATE LEVEL   ACETAMINOPHEN LEVEL   DRUG SCREEN PANEL, URINE EMERGENCY   TROPONIN I HIGH SENSITIVITY   URINALYSIS, REFLEX TO URINE CULTURE   ACETAMINOPHEN LEVEL   SALICYLATE LEVEL   ALCOHOL,MEDICAL (ETHANOL)   DRUG SCREEN PANEL, URINE EMERGENCY   T4, FREE        ECG Results              EKG 12-lead (In process)  Result time 04/16/23 14:47:48      In process by Interface, Lab In [facility] (04/16/23 14:47:48)                   Narrative:    Test Reason : R07.9,    Vent. Rate : 069 BPM     Atrial Rate : 071 BPM     P-R Int : 204 ms          QRS Dur : 114 ms      QT Int : 442 ms       P-R-T Axes : 092 -06 123 degrees     QTc Int : 473 ms    Undetermined rhythm  Possible Anteroseptal infarct (cited on or before 18-DEC-2016)  Abnormal ECG  When compared with ECG of 15-APR-2023 01:35,  Current undetermined rhythm precludes rhythm comparison, needs review    Referred By: [email or web address]   SELF           Confirmed By:                                   Imaging Results              X-Ray Chest AP Portable (Final result)   Result time 04/16/23 14:26:36      Final result by Jose Roberto Salazar MD (04/16/23 14:26:36)                   Narrative:    HISTORY: Chest Pain    FINDINGS: Portable chest radiograph at 1407 hours compared to prior exams including 04/15/2023 shows single-lead left subclavian CCD unchanged in position, with median sternotomy wires and mediastinal surgical clips. The cardiac silhouette is enlarged, stable in size, with normal pulmonary vascularity and aortic calcifications.    The lungs are normally expanded, with resolution of previous mild diffuse interstitial prominence. There is no consolidation, large pleural effusion, evidence of pulmonary edema, or pneumothorax.    IMPRESSION: Interval resolution of previous mild interstitial prominence, with no evidence of acute cardiopulmonary disease.    Electronically signed by:  Jose Roberto Salazar MD  4/16/2023 2:26 PM CDT Workstation: [id number]                                     Medications   magnesium sulfate 2g in water 50mL IVPB (premix) (2 g Intravenous New Bag 4/16/23 4614)   amiodarone 360 mg/200 mL (1.8 mg/mL) infusion (has no administration in time range)   metoprolol injection 5 mg (has no administration in time range)     Medical Decision Making:   Initial Assessment:   67-year-old man with known coronary artery disease multivessel stents, hyperlipidemia, hypertension, chronic combined CHF status post AICD, tobacco use, with recent hospitalization for evaluation for ventricular tachycardia.  Patient had reported on last hospitalization stents AICD had fired.  During this hospitalization patient found with wide complex tachycardia.  No evidence of DVT was noted however.  Patient after recent discharge from hospital approximately 1 hour later per re-presented to the emergency department with complaint of persistent shortness of breath, palpitations and impending feelings of doom.  Patient emergency department had nonsustained episode of VT which was documented.  Patient  symptoms lasted approximately 5-10 minutes.  Currently patient seems anxious however chest pain did resolve and denies any other constitutional symptoms.    Differential Diagnosis:   Ventricular tachycardia, wide complex tachycardia, SVT, dehydration  Clinical Tests:   Lab Tests: Ordered and Reviewed  Radiological Study: Ordered and Reviewed  Medical Tests: Ordered and Reviewed  ED Management:  Patient seen evaluated emergency department.  Patient underwent immediate pacemaker interrogation for suspected ventricular tachycardia.  [employer] device was interrogated and patient in T had nonsustained VT with rate of 178 beats per minute.  Found also with increased fluid impedance.  Patient case was discussed with Cardiology.  Currently at this time patient will be resumed back on amiodarone infusion.  Will be considered for cardiac catheterization in a.m..  Will optimize electrolytes including maintain at potassium greater than 4.5.  Patient did receive magnesium 2 g IV piggyback emergency department.  Patient also found with history of worsening anxiety per Hospital Medicine.  Was pending a tele psych consult prior to leaving the hospital earlier.  At this time patient will be consult for tele psych to evaluate for anxiety and for treatment recommendations for anxiety.  Patient remained hemodynamically adequate admitted back to Hospital Medicine.  Will be placed on step-down unit.          Attending Attestation:         Attending Critical Care:   Critical Care Times:   Direct Patient Care (initial evaluation, reassessments, and time considering the case)................................................................30 minutes.   Additional History from reviewing old medical records or taking additional history from the family, EMS, PCP, etc.......................10 minutes.   Ordering, Reviewing, and Interpreting Diagnostic  Studies...............................................................................................................10 minutes.   Documentation..................................................................................................................................................................................10 minutes.   Consultation with other Physicians. .................................................................................................................................................10 minutes.   Consultation with the patient's family directly relating to the patient's condition, care, and DNR status (when patient unable)......5 minutes.   ==============================================================  Total Critical Care Time - exclusive of procedural time: 75 minutes.  ==============================================================  Critical care was necessary to treat or prevent imminent or life-threatening deterioration of the following conditions: cardiac arrhythmia (Ventricular tachycardia).   Critical care was time spent personally by me on the following activities: obtaining history from patient or relative, examination of patient, review of x-rays / CT sent with the patient, review of old charts, ordering lab, x-rays, and/or EKG, development of treatment plan with patient or relative, ordering and performing treatments and interventions, evaluation of patient's response to treatment, discussion with consultants and re-evaluation of patient's conition.   Critical Care Condition: potentially life-threatening                      Clinical Impression:   Final diagnoses:  [R07.9] Chest pain  [I47.20] Ventricular tachycardia  [F41.9] Anxiety        ED Disposition Condition    Admit Stable                Torin Nunez MD  04/16/23 3406

## 2023-04-16 NOTE — PROGRESS NOTES
[facility]  Department of Cardiology  Consult Note      PATIENT NAME: Brandon Rivas  MRN: [id number]  TODAY'S DATE: 04/16/2023  ADMIT DATE: 4/15/2023                          CONSULT REQUESTED BY: Jair Leon MD    SUBJECTIVE     PRINCIPAL PROBLEM: V-tach      REASON FOR CONSULT:   Ventricular Tachycardia    INTERVAL HISTORY  4/16/23    Patient is resting comfortably in bed during examination, sinus rhythm on the monitor heart rate 56.  Blood pressure 118/67.  2 L nasal cannula patient was still on amiodarone drip at 0.5 milligrams/hour.  No acute complaints at this time.  Mild dizziness.  No events on telemetry.      HPI:    Patient is a 67-year-old male with CAD s/p stent, hyperlipidemia, hypertension, chronic combined CHF s/p AICD who presented to the ED with chest pain and palpitations.  AICD fired last week with increased palpitations and chest discomfort since.  Heart rate at home was 180.  Patient was found to be in VT versus SVT and was treated with 5 mg of IV metoprolol, 2 g magnesium, and 150 mg amiodarone push.  Amiodarone infusion was initiated.  Patient is anxious during examination.  He was stress about amiodarone as he is concerned it will cause lung problems.  Questionable compliance with home medications.    During examination he was anxious, in sinus rhythm, and on amiodarone drip.  No edema.  No acute distress.            FROM H&P   Chest Pain       Chest pain x 1 day. States heart rate was in 180's.  Took 4 ntg at home.          HPI: 67-year-old man with CAD status post stenting, hyperlipidemia, hypertension, chronic combined CHF status post AICD, tobacco use, presenting with chest pain and palpitations.  Patient reports last week AICD firing.  He has had intermittent palpitations with chest pain since, and they worsened today.  He measured his heart rate at 180 at home.   He presented to the ED where EKG showed V-tach versus SVT.  He was treated with 5 mg IV metoprolol,  "2 g magnesium, and 150 mg amiodarone push.  Cardiology was contacted and he was started on amiodarone infusion.  He has gone in and out of wide complex tachycardia while in the ED with stable blood pressure and mentation.    Patient reports he has been very depressed and stressed about his medical problems and this tends to make his  heart rate increase.  He takes amiodarone but only once daily instead of twice because he is concerned about lung effects.  He is noncompliant with Lasix because it "tears up his insides".  He reports compliance with his metoprolol.  He continues to smoke cigars a few puffs a day.  He no longer uses alcohol.  He was recently treated for a UTI which he states is resolved and he had a urinalysis last week showing clearance.  He denies current chest pain, fever, chills, cough, dizziness, syncope, shortness of breath, vision changes.         Review of patient's allergies indicates:   Allergen Reactions    Bactrim [sulfamethoxazole-trimethoprim] Shortness Of Breath    Statins-hmg-coa reductase inhibitors Other (See Comments)     Statin intolerance. Muscle weakness    Wellbutrin [bupropion hcl] Other (See Comments)     Abdominal pain        Past Medical History:   Diagnosis Date    Coronary artery disease     Hypertension     MI (myocardial infarction)     Thyroid disease      Past Surgical History:   Procedure Laterality Date    CARDIAC PACEMAKER PLACEMENT       Social History     Tobacco Use    Smoking status: Every Day     Packs/day: 1.00     Years: 40.00     Pack years: 40.00     Types: Cigarettes    Smokeless tobacco: Never   Substance Use Topics    Alcohol use: Yes     Comment: 12 pack beer daily    Drug use: No        REVIEW OF SYSTEMS  CONSTITUTIONAL: Negative for chills, fatigue and fever.   EYES: No double vision, No blurred vision  NEURO: No headaches, positive dizziness  RESPIRATORY: Negative for cough, shortness of breath and wheezing.    CARDIOVASCULAR:  Positive for chest " pain-improved.  Positive for palpitations-improved and negative leg swelling.   GI: Negative for abdominal pain, No melena, diarrhea, nausea and vomiting.   : Negative for dysuria and frequency, Negative for hematuria  SKIN: Negative for bruising, Negative for edema or discoloration noted.   ENDOCRINE: Negative for polyphagia, Negative for heat intolerance, Negative for cold intolerance  PSYCHIATRIC: Negative for depression, Negative for anxiety, Negative for memory loss  MUSCULOSKELETAL: Negative for neck pain, Negative for muscle weakness, Negative for back pain     OBJECTIVE     VITAL SIGNS (Most Recent)  Temp: 97.1 °F (36.2 °C) (04/16/23 0701)  Pulse: (!) 57 (04/16/23 1000)  Resp: (!) 23 (04/16/23 1000)  BP: 113/67 (04/16/23 1000)  SpO2: (!) 91 % (04/16/23 1000)    VENTILATION STATUS  Resp: (!) 23 (04/16/23 1000)  SpO2: (!) 91 % (04/16/23 1000)           I & O (Last 24H):  Intake/Output Summary (Last 24 hours) at 4/16/2023 1107  Last data filed at 4/16/2023 1057  Gross per 24 hour   Intake 1717.88 ml   Output 2450 ml   Net -732.12 ml       WEIGHTS  Wt Readings from Last 3 Encounters:   04/15/23 0530 97 kg (213 lb 13.5 oz)   04/15/23 0114 95.3 kg (210 lb)   04/03/23 0954 96.6 kg (213 lb)   03/07/23 1038 94.8 kg (209 lb)       PHYSICAL EXAM  GENERAL: well built, well nourished, well-developed in no apparent distress alert and oriented.   HEENT: Normocephalic. Pupils normal and conjunctivae normal.   NECK: No JVD. No bruit..   THYROID: Thyroid not enlarged. No nodules present..   CARDIAC: Regular rate and rhythm. S1 is normal.S2 is normal.  2/6 systolic murmur  CHEST ANATOMY: normal.   LUNGS: Clear to auscultation. No wheezing or rhonchi..   ABDOMEN: Soft Normal bowel sounds.  Nontender  URINARY: No farrar catheter   EXTREMITIES: No cyanosis, clubbing or edema noted at this time.,  PERIPHERAL VASCULAR SYSTEM: Good palpable distal pulses.   CENTRAL NERVOUS SYSTEM: No focal motor or sensory deficits noted.   SKIN:  Skin without lesions, moist, well perfused.   MUSCLE STRENGTH & TONE: No noteable weakness, atrophy or abnormal movement.     HOME MEDICATIONS:  No current facility-administered medications on file prior to encounter.     Current Outpatient Medications on File Prior to Encounter   Medication Sig Dispense Refill    amiodarone (PACERONE) 200 MG Tab Take 2 tablets (400 mg total) by mouth 2 (two) times daily for 3 days, THEN 1 tablet (200 mg total) 2 (two) times daily. 192 tablet 0    aspirin 81 MG Chew Take 1 tablet (81 mg total) by mouth once daily.  0    butalbital-acetaminophen-caffeine -40 mg (FIORICET, ESGIC) -40 mg per tablet Take 1 tablet by mouth every 4 (four) hours as needed for Headaches. 20 tablet 0    furosemide (LASIX) 40 MG tablet Take 1 tablet (40 mg total) by mouth once daily. 90 tablet 3    lisinopriL (PRINIVIL,ZESTRIL) 20 MG tablet Take 1 tablet (20 mg total) by mouth once daily. 90 tablet 3    LORazepam (ATIVAN) 0.5 MG tablet Take 1 tablet (0.5 mg total) by mouth every 8 (eight) hours as needed for Anxiety. 30 tablet 0    metoprolol succinate (TOPROL-XL) 50 MG 24 hr tablet Take 1 tablet (50 mg total) by mouth once daily. 90 tablet 3    nitroGLYCERIN (NITROSTAT) 0.4 MG SL tablet Place 1 tablet (0.4 mg total) under the tongue every 5 (five) minutes as needed for Chest pain. 25 tablet 6    pantoprazole (PROTONIX) 40 MG tablet Take 40 mg by mouth once daily.      potassium chloride (KLOR-CON) 10 MEQ TbSR Take 1 tablet (10 mEq total) by mouth every other day. 45 tablet 3    spironolactone (ALDACTONE) 25 MG tablet Take 1 tablet (25 mg total) by mouth once daily. 90 tablet 3    tamsulosin (FLOMAX) 0.4 mg Cap Take 1 capsule by mouth once daily.         SCHEDULED MEDS:   aspirin  81 mg Oral Daily    chlorhexidine  15 mL Mouth/Throat BID    enoxparin  40 mg Subcutaneous Q24H    furosemide  40 mg Oral Daily    lisinopriL  20 mg Oral Daily    mupirocin   Nasal BID    nicotine  1 patch Transdermal  Daily    pantoprazole  40 mg Oral Before breakfast    spironolactone  25 mg Oral Daily       CONTINUOUS INFUSIONS:   amiodarone in dextrose 5% Stopped (04/16/23 1057)       PRN MEDS:acetaminophen, butalbital-acetaminophen-caffeine -40 mg, LORazepam, melatonin, nitroGLYCERIN, ondansetron, polyethylene glycol, sodium chloride 0.9%    LABS AND DIAGNOSTICS     CBC LAST 3 DAYS  Recent Labs   Lab 04/15/23  0135 04/16/23  0357   WBC 8.73 6.60   RBC 4.24* 4.07*   HGB 13.1* 12.5*   HCT 39.7* 38.6*   MCV 94 95   MCH 30.9 30.7   MCHC 33.0 32.4   RDW 15.3* 15.3*    187   MPV 11.1 11.5   GRAN 66.4  5.8  --    LYMPH 18.6  1.6  --    MONO 9.7  0.9  --    BASO 0.08  --    NRBC 0  --        COAGULATION LAST 3 DAYS  Recent Labs   Lab 04/15/23  0135   INR 1.2       CHEMISTRY LAST 3 DAYS  Recent Labs   Lab 04/15/23  0135 04/16/23  0357    135*   K 3.9 4.1    108   CO2 22* 19*   ANIONGAP 13 8   BUN 16 13   CREATININE 0.9 0.6   GLU 96 89   CALCIUM 8.9 8.8   MG 2.0 2.2   ALBUMIN 4.1  --    PROT 7.4  --    ALKPHOS 80  --    ALT 22  --    AST 22  --    BILITOT 0.8  --        CARDIAC PROFILE LAST 3 DAYS  Recent Labs   Lab 04/15/23  0135 04/15/23  0518 04/15/23  0902   *  --   --    TROPONINIHS 16.6* 16.9* 14.5       ENDOCRINE LAST 3 DAYS  No results for input(s): TSH, PROCAL in the last 168 hours.    LAST ARTERIAL BLOOD GAS  ABG  No results for input(s): PH, PO2, PCO2, HCO3, BE in the last 168 hours.    LAST 7 DAYS MICROBIOLOGY   Microbiology Results (last 7 days)       ** No results found for the last 168 hours. **            MOST RECENT IMAGING  X-Ray Chest AP Portable  HISTORY: chest pain  and tachycardia.    FINDINGS: Portable chest radiograph at 0206 hours compared to 02/21/2023 shows single-lead left subclavian CCD unchanged in position, with median sternotomy wires and mediastinal surgical clips. The cardiac silhouette is enlarged, stable in size, with normal pulmonary vascularity.    The lungs are  normally expanded, with stable mild diffuse interstitial prominence, nonspecific. There is no consolidation, large pleural effusion, or pneumothorax. No acute fractures.    IMPRESSION: No evidence of acute cardiopulmonary disease.    Electronically signed by:  Jose Roberto Salazar MD  4/15/2023 9:50 AM CDT Workstation: [id number]      ECHOCARDIOGRAM RESULTS (last 5)  Results for orders placed during the hospital encounter of 02/21/23    Echo    Interpretation Summary  · The left ventricle is mildly enlarged with moderate concentric hypertrophy evidence of old apical scar seen on apical four-chamber view  · The estimated ejection fraction is 34%.  · Grade III left ventricular diastolic dysfunction. Mean left atrial pressure 11  · There are segmental left ventricular wall motion abnormalities.  · Atrial fibrillation not observed.  · Normal right ventricular size with mildly reduced right ventricular systolic function.  · Mild-to-moderate aortic regurgitation.  · Severe mitral regurgitation.  · Intermediate central venous pressure (8 mmHg).  · The estimated PA systolic pressure is 30 mmHg. Mild pulmonary hypertension is present    Patient has sinus rhythm      CURRENT/PREVIOUS VISIT EKG  Results for orders placed or performed during the hospital encounter of 04/15/23   EKG 12-lead    Collection Time: 04/15/23  1:48 AM    Narrative    Test Reason : R07.9,    Vent. Rate : 079 BPM     Atrial Rate : 079 BPM     P-R Int : 168 ms          QRS Dur : 112 ms      QT Int : 394 ms       P-R-T Axes : 076 025 215 degrees     QTc Int : 451 ms    Sinus rhythm with Premature atrial complexes  Septal infarct (cited on or before 18-DEC-2016)  T wave abnormality, consider inferior ischemia  Abnormal ECG  When compared with ECG of 15-APR-2023 01:35,  No significant change was found    Referred By: [email or web address]   SELF           Confirmed By:            ASSESSMENT/PLAN:     Active Hospital Problems    Diagnosis    *V-tach    Moderate episode of  recurrent major depressive disorder    PVD (peripheral vascular disease)    Chronic combined systolic and diastolic heart failure    Hypertension    Coronary artery disease    Tobacco dependence       ASSESSMENT & PLAN:     Ventricular tachycardia  Major depressive disorder  Hypertension  Chronic combined systolic and diastolic heart failure  CAD      RECOMMENDATIONS:    Patient presented to the ED with ventricular tachycardia versus SVT.  He is noncompliant with medication regimen at home.  Patient is currently on amiodarone drip.  Discontinue gtt and change to amiodarone 200 mg t.i.d.   Continue cardiac monitoring.  Troponin HS non elevated.  Continue lasix 40 mg daily. Strict I&Os.  1.5 L fluid restriction.  2 g low-sodium heart healthy diet.  Continue spironolactone.  Recommend compliance with home medication regimen.   Continue aspirin 81 mg daily. Continue lisinopril. Hold for SBP <100.   Continue to check and replace potassium and magnesium. Goal for potassium is 4.0, and goal for magnesium is 2.0.    Thank you for the consultation.  We will continue to follow .      Shirlene Oconnell NP  Department of Cardiology  Date of Service: 04/16/2023        I have personally interviewed and examined the patient, I have reviewed the Nurse Practitioner's history and physical, assessment, and plan. I agree with the findings and plan.    Continue amiodarone loading dose.  Patient has issues with depression and compliance with medication.    Timothy John M.D.  Department of Cardiology  Date of Service: 04/16/2023  6:52 PM

## 2023-04-16 NOTE — PLAN OF CARE
04/15/23 2039   Patient Assessment/Suction   Level of Consciousness (AVPU) alert   Respiratory Effort Unlabored   Expansion/Accessory Muscles/Retractions expansion symmetric   All Lung Fields Breath Sounds clear   Rhythm/Pattern, Respiratory depth regular;pattern regular   Cough Frequency infrequent   Cough Type good;nonproductive   PRE-TX-O2   Device (Oxygen Therapy) room air   SpO2 98 %   Pulse Oximetry Type Continuous   $ Pulse Oximetry - Multiple Charge Pulse Oximetry - Multiple   Pulse (!) 58   Resp 20   Education   $ Education DME Oxygen;15 min

## 2023-04-16 NOTE — PLAN OF CARE
04/16/23 1211   Final Note   Assessment Type Final Discharge Note   Anticipated Discharge Disposition Home-Health   What phone number can be called within the next 1-3 days to see how you are doing after discharge? [phone]   Post-Acute Status   Post-Acute Authorization Home Health   Home Health Status Referrals Sent   Coverage medicare a&b   Discharge Delays None known at this time     Pt has d/c orders, SW informed MD that patient needs resumption of home health order. Order placed, CHRISTIE sent HH order and discharge summary to [facility] via [facility]. Start of service date 4/17/23.    Discharge orders and chart reviewed with no further post-acute discharge needs identified at this time.  At this time, patient is cleared for discharge from Case Management standpoint.

## 2023-04-16 NOTE — NURSING
"Pt was educated on the list of medications and what they were for this morning. Pt refused his Lisinopril stating he hasn't taken that in years. He did not wish to take the Lasix or Aldactone this morning stating that it makes him "sick to his stomach." Dr. Leon notified via secure chat. Continue to monitor.    "

## 2023-04-17 LAB
ALBUMIN SERPL BCP-MCNC: 3.8 G/DL (ref 3.5–5.2)
ALP SERPL-CCNC: 76 U/L (ref 55–135)
ALT SERPL W/O P-5'-P-CCNC: 23 U/L (ref 10–44)
ANION GAP SERPL CALC-SCNC: 6 MMOL/L (ref 8–16)
AST SERPL-CCNC: 23 U/L (ref 10–40)
BASOPHILS # BLD AUTO: 0.06 K/UL (ref 0–0.2)
BASOPHILS NFR BLD: 1 % (ref 0–1.9)
BILIRUB SERPL-MCNC: 0.6 MG/DL (ref 0.1–1)
BUN SERPL-MCNC: 13 MG/DL (ref 8–23)
CALCIUM SERPL-MCNC: 8.6 MG/DL (ref 8.7–10.5)
CHLORIDE SERPL-SCNC: 108 MMOL/L (ref 95–110)
CO2 SERPL-SCNC: 22 MMOL/L (ref 23–29)
CREAT SERPL-MCNC: 0.7 MG/DL (ref 0.5–1.4)
DIFFERENTIAL METHOD: ABNORMAL
EOSINOPHIL # BLD AUTO: 0.3 K/UL (ref 0–0.5)
EOSINOPHIL NFR BLD: 4.4 % (ref 0–8)
ERYTHROCYTE [DISTWIDTH] IN BLOOD BY AUTOMATED COUNT: 15.2 % (ref 11.5–14.5)
EST. GFR  (NO RACE VARIABLE): >60 ML/MIN/1.73 M^2
GLUCOSE SERPL-MCNC: 95 MG/DL (ref 70–110)
HCT VFR BLD AUTO: 38.5 % (ref 40–54)
HGB BLD-MCNC: 12.8 G/DL (ref 14–18)
IMM GRANULOCYTES # BLD AUTO: 0.01 K/UL (ref 0–0.04)
IMM GRANULOCYTES NFR BLD AUTO: 0.2 % (ref 0–0.5)
LYMPHOCYTES # BLD AUTO: 1.6 K/UL (ref 1–4.8)
LYMPHOCYTES NFR BLD: 26.2 % (ref 18–48)
MAGNESIUM SERPL-MCNC: 2.2 MG/DL (ref 1.6–2.6)
MCH RBC QN AUTO: 30.8 PG (ref 27–31)
MCHC RBC AUTO-ENTMCNC: 33.2 G/DL (ref 32–36)
MCV RBC AUTO: 93 FL (ref 82–98)
MONOCYTES # BLD AUTO: 0.5 K/UL (ref 0.3–1)
MONOCYTES NFR BLD: 8.7 % (ref 4–15)
NEUTROPHILS # BLD AUTO: 3.5 K/UL (ref 1.8–7.7)
NEUTROPHILS NFR BLD: 59.5 % (ref 38–73)
NRBC BLD-RTO: 0 /100 WBC
PLATELET # BLD AUTO: 199 K/UL (ref 150–450)
PMV BLD AUTO: 11 FL (ref 9.2–12.9)
POTASSIUM SERPL-SCNC: 3.8 MMOL/L (ref 3.5–5.1)
PROT SERPL-MCNC: 7.3 G/DL (ref 6–8.4)
RBC # BLD AUTO: 4.15 M/UL (ref 4.6–6.2)
SODIUM SERPL-SCNC: 136 MMOL/L (ref 136–145)
WBC # BLD AUTO: 5.95 K/UL (ref 3.9–12.7)

## 2023-04-17 PROCEDURE — 99223 PR INITIAL HOSPITAL CARE,LEVL III: ICD-10-PCS | Mod: ,,, | Performed by: INTERNAL MEDICINE

## 2023-04-17 PROCEDURE — 63600175 PHARM REV CODE 636 W HCPCS: Performed by: INTERNAL MEDICINE

## 2023-04-17 PROCEDURE — G0425 PR INPT TELEHEALTH CONSULT 30M: ICD-10-PCS | Mod: 95,,, | Performed by: PSYCHIATRY & NEUROLOGY

## 2023-04-17 PROCEDURE — 25000003 PHARM REV CODE 250: Performed by: INTERNAL MEDICINE

## 2023-04-17 PROCEDURE — 82962 GLUCOSE BLOOD TEST: CPT

## 2023-04-17 PROCEDURE — 94761 N-INVAS EAR/PLS OXIMETRY MLT: CPT

## 2023-04-17 PROCEDURE — 96375 TX/PRO/DX INJ NEW DRUG ADDON: CPT

## 2023-04-17 PROCEDURE — 85025 COMPLETE CBC W/AUTO DIFF WBC: CPT | Performed by: INTERNAL MEDICINE

## 2023-04-17 PROCEDURE — 36415 COLL VENOUS BLD VENIPUNCTURE: CPT | Performed by: INTERNAL MEDICINE

## 2023-04-17 PROCEDURE — 99900035 HC TECH TIME PER 15 MIN (STAT)

## 2023-04-17 PROCEDURE — 80053 COMPREHEN METABOLIC PANEL: CPT | Performed by: INTERNAL MEDICINE

## 2023-04-17 PROCEDURE — 96366 THER/PROPH/DIAG IV INF ADDON: CPT

## 2023-04-17 PROCEDURE — 63600175 PHARM REV CODE 636 W HCPCS: Performed by: EMERGENCY MEDICINE

## 2023-04-17 PROCEDURE — 25000003 PHARM REV CODE 250

## 2023-04-17 PROCEDURE — G0425 INPT/ED TELECONSULT30: HCPCS | Mod: 95,,, | Performed by: PSYCHIATRY & NEUROLOGY

## 2023-04-17 PROCEDURE — 21000000 HC CCU ICU ROOM CHARGE

## 2023-04-17 PROCEDURE — 83735 ASSAY OF MAGNESIUM: CPT | Performed by: INTERNAL MEDICINE

## 2023-04-17 PROCEDURE — 99223 1ST HOSP IP/OBS HIGH 75: CPT | Mod: ,,, | Performed by: INTERNAL MEDICINE

## 2023-04-17 RX ORDER — LORAZEPAM 0.5 MG/1
0.5 TABLET ORAL EVERY 8 HOURS PRN
Status: DISCONTINUED | OUTPATIENT
Start: 2023-04-17 | End: 2023-04-17 | Stop reason: SDUPTHER

## 2023-04-17 RX ORDER — LISINOPRIL 20 MG/1
20 TABLET ORAL DAILY
Status: DISCONTINUED | OUTPATIENT
Start: 2023-04-17 | End: 2023-04-19

## 2023-04-17 RX ORDER — BUTALBITAL, ACETAMINOPHEN AND CAFFEINE 50; 325; 40 MG/1; MG/1; MG/1
1 TABLET ORAL EVERY 4 HOURS PRN
Status: DISCONTINUED | OUTPATIENT
Start: 2023-04-17 | End: 2023-04-22 | Stop reason: HOSPADM

## 2023-04-17 RX ORDER — NAPROXEN SODIUM 220 MG/1
81 TABLET, FILM COATED ORAL DAILY
Status: DISCONTINUED | OUTPATIENT
Start: 2023-04-17 | End: 2023-04-22 | Stop reason: HOSPADM

## 2023-04-17 RX ORDER — SODIUM CHLORIDE 0.9 % (FLUSH) 0.9 %
2 SYRINGE (ML) INJECTION
Status: DISCONTINUED | OUTPATIENT
Start: 2023-04-17 | End: 2023-04-22 | Stop reason: HOSPADM

## 2023-04-17 RX ORDER — POTASSIUM CHLORIDE 750 MG/1
10 CAPSULE, EXTENDED RELEASE ORAL EVERY OTHER DAY
Status: DISCONTINUED | OUTPATIENT
Start: 2023-04-18 | End: 2023-04-22 | Stop reason: HOSPADM

## 2023-04-17 RX ORDER — METOPROLOL SUCCINATE 50 MG/1
50 TABLET, EXTENDED RELEASE ORAL DAILY
Status: DISCONTINUED | OUTPATIENT
Start: 2023-04-17 | End: 2023-04-19

## 2023-04-17 RX ORDER — MUPIROCIN 20 MG/G
OINTMENT TOPICAL 2 TIMES DAILY
Status: DISPENSED | OUTPATIENT
Start: 2023-04-17 | End: 2023-04-22

## 2023-04-17 RX ORDER — CHLORHEXIDINE GLUCONATE ORAL RINSE 1.2 MG/ML
15 SOLUTION DENTAL 2 TIMES DAILY
Status: DISPENSED | OUTPATIENT
Start: 2023-04-17 | End: 2023-04-22

## 2023-04-17 RX ORDER — PANTOPRAZOLE SODIUM 40 MG/1
40 TABLET, DELAYED RELEASE ORAL
Status: DISCONTINUED | OUTPATIENT
Start: 2023-04-17 | End: 2023-04-22 | Stop reason: HOSPADM

## 2023-04-17 RX ORDER — SPIRONOLACTONE 25 MG/1
25 TABLET ORAL DAILY
Status: DISCONTINUED | OUTPATIENT
Start: 2023-04-17 | End: 2023-04-22 | Stop reason: HOSPADM

## 2023-04-17 RX ORDER — NITROGLYCERIN 0.4 MG/1
0.4 TABLET SUBLINGUAL EVERY 5 MIN PRN
Status: DISCONTINUED | OUTPATIENT
Start: 2023-04-17 | End: 2023-04-22 | Stop reason: HOSPADM

## 2023-04-17 RX ORDER — FUROSEMIDE 40 MG/1
40 TABLET ORAL DAILY
Status: DISCONTINUED | OUTPATIENT
Start: 2023-04-17 | End: 2023-04-19

## 2023-04-17 RX ADMIN — MUPIROCIN 1 G: 20 OINTMENT TOPICAL at 08:04

## 2023-04-17 RX ADMIN — ACETAMINOPHEN 650 MG: 325 TABLET ORAL at 05:04

## 2023-04-17 RX ADMIN — ENOXAPARIN SODIUM 40 MG: 40 INJECTION SUBCUTANEOUS at 05:04

## 2023-04-17 RX ADMIN — AMIODARONE HYDROCHLORIDE 1 MG/MIN: 1.8 INJECTION, SOLUTION INTRAVENOUS at 04:04

## 2023-04-17 RX ADMIN — MELATONIN 9 MG: at 08:04

## 2023-04-17 RX ADMIN — ACETAMINOPHEN 650 MG: 325 TABLET ORAL at 01:04

## 2023-04-17 RX ADMIN — ACETAMINOPHEN 650 MG: 325 TABLET ORAL at 08:04

## 2023-04-17 RX ADMIN — LORAZEPAM 0.5 MG: 0.5 TABLET ORAL at 08:04

## 2023-04-17 RX ADMIN — ASPIRIN 81 MG 81 MG: 81 TABLET ORAL at 10:04

## 2023-04-17 RX ADMIN — CHLORHEXIDINE GLUCONATE 15 ML: 1.2 RINSE ORAL at 08:04

## 2023-04-17 RX ADMIN — LORAZEPAM 0.5 MG: 0.5 TABLET ORAL at 01:04

## 2023-04-17 RX ADMIN — LORAZEPAM 0.5 MG: 0.5 TABLET ORAL at 02:04

## 2023-04-17 RX ADMIN — PANTOPRAZOLE SODIUM 40 MG: 40 TABLET, DELAYED RELEASE ORAL at 10:04

## 2023-04-17 RX ADMIN — METOPROLOL SUCCINATE 50 MG: 50 TABLET, FILM COATED, EXTENDED RELEASE ORAL at 10:04

## 2023-04-17 RX ADMIN — AMIODARONE HYDROCHLORIDE 0.5 MG/MIN: 1.8 INJECTION, SOLUTION INTRAVENOUS at 08:04

## 2023-04-17 RX ADMIN — POTASSIUM BICARBONATE 50 MEQ: 977.5 TABLET, EFFERVESCENT ORAL at 10:04

## 2023-04-17 RX ADMIN — ACETAMINOPHEN 650 MG: 325 TABLET ORAL at 04:04

## 2023-04-17 RX ADMIN — LORAZEPAM 0.5 MG: 0.5 TABLET ORAL at 07:04

## 2023-04-17 NOTE — NURSING
I tried to get this patient to allow me to put his amiodarone drip back on him. He still is refusing at this time saying that that stuff is poison and he can't take it very long. Dr. Leon is aware that he is refusing his drip at this time.

## 2023-04-17 NOTE — PROGRESS NOTES
[facility]  Department of Cardiology  Consult Note      PATIENT NAME: Brandon Rivas  MRN: [id number]  TODAY'S DATE: 04/17/2023  ADMIT DATE: 4/16/2023                          CONSULT REQUESTED BY: Jair Leon MD    SUBJECTIVE     PRINCIPAL PROBLEM: <principal problem not specified>      REASON FOR CONSULT:   Ventricular Tachycardia    INTERVAL HISTORY    4/17/23    Patient was discharged yesterday afternoon at approximately 1:00 a.m. later re-presented to the ED with complaints of persistent shortness of breath, palpitations, impending doom and he was found to be in nonsustained VT.  Symptoms lasted approximately 5-10 minutes.  Patient had VT overnight as well.  Patient has refused amiodarone drip this morning but did receive amiodarone at 1 milligram/hour overnight.  Patient states amiodarone gives him dizziness and feelings of shortness of breath.  No acute distress during examination.  Sinus rhythm on the monitor.  98% on room air.  Patient appears anxious.    4/16/23    Patient is resting comfortably in bed during examination, sinus rhythm on the monitor heart rate 56.  Blood pressure 118/67.  2 L nasal cannula patient was still on amiodarone drip at 0.5 milligrams/hour.  No acute complaints at this time.  Mild dizziness.  No events on telemetry.      HPI:    Patient is a 67-year-old male with CAD s/p stent, hyperlipidemia, hypertension, chronic combined CHF s/p AICD who presented to the ED with chest pain and palpitations.  AICD fired last week with increased palpitations and chest discomfort since.  Heart rate at home was 180.  Patient was found to be in VT versus SVT and was treated with 5 mg of IV metoprolol, 2 g magnesium, and 150 mg amiodarone push.  Amiodarone infusion was initiated.  Patient is anxious during examination.  He was stress about amiodarone as he is concerned it will cause lung problems.  Questionable compliance with home medications.    During examination he was  "anxious, in sinus rhythm, and on amiodarone drip.  No edema.  No acute distress.            FROM H&P   Chest Pain       Chest pain x 1 day. States heart rate was in 180's.  Took 4 ntg at home.          HPI: 67-year-old man with CAD status post stenting, hyperlipidemia, hypertension, chronic combined CHF status post AICD, tobacco use, presenting with chest pain and palpitations.  Patient reports last week AICD firing.  He has had intermittent palpitations with chest pain since, and they worsened today.  He measured his heart rate at 180 at home.   He presented to the ED where EKG showed V-tach versus SVT.  He was treated with 5 mg IV metoprolol, 2 g magnesium, and 150 mg amiodarone push.  Cardiology was contacted and he was started on amiodarone infusion.  He has gone in and out of wide complex tachycardia while in the ED with stable blood pressure and mentation.    Patient reports he has been very depressed and stressed about his medical problems and this tends to make his  heart rate increase.  He takes amiodarone but only once daily instead of twice because he is concerned about lung effects.  He is noncompliant with Lasix because it "tears up his insides".  He reports compliance with his metoprolol.  He continues to smoke cigars a few puffs a day.  He no longer uses alcohol.  He was recently treated for a UTI which he states is resolved and he had a urinalysis last week showing clearance.  He denies current chest pain, fever, chills, cough, dizziness, syncope, shortness of breath, vision changes.         Review of patient's allergies indicates:   Allergen Reactions    Bactrim [sulfamethoxazole-trimethoprim] Shortness Of Breath    Statins-hmg-coa reductase inhibitors Other (See Comments)     Statin intolerance. Muscle weakness    Wellbutrin [bupropion hcl] Other (See Comments)     Abdominal pain        Past Medical History:   Diagnosis Date    Coronary artery disease     Hypertension     MI (myocardial infarction) "     Thyroid disease      Past Surgical History:   Procedure Laterality Date    CARDIAC PACEMAKER PLACEMENT       Social History     Tobacco Use    Smoking status: Every Day     Packs/day: 1.00     Years: 40.00     Pack years: 40.00     Types: Cigarettes    Smokeless tobacco: Never   Substance Use Topics    Alcohol use: Yes     Comment: 12 pack beer daily    Drug use: No        REVIEW OF SYSTEMS  CONSTITUTIONAL: Negative for chills, fatigue and fever.   EYES: No double vision, No blurred vision  NEURO: No headaches, positive dizziness  RESPIRATORY: + shortness of breath with exertion and prolonged talking Negative for cough, shortness of breath and wheezing.    CARDIOVASCULAR:  Positive for chest pain-improved.  Positive for palpitations-improved and negative leg swelling.   GI: Negative for abdominal pain, No melena, diarrhea, nausea and vomiting.   : Negative for dysuria and frequency, Negative for hematuria  SKIN: Negative for bruising, Negative for edema or discoloration noted.   ENDOCRINE: Negative for polyphagia, Negative for heat intolerance, Negative for cold intolerance  PSYCHIATRIC: Negative for depression, + for anxiety, Negative for memory loss  MUSCULOSKELETAL: Negative for neck pain, Negative for muscle weakness, Negative for back pain     OBJECTIVE     VITAL SIGNS (Most Recent)  Temp: 97.8 °F (36.6 °C) (04/17/23 1100)  Pulse: 69 (04/17/23 1100)  Resp: (!) 27 (04/17/23 1100)  BP: (!) 142/92 (04/17/23 1100)  SpO2: 98 % (04/17/23 1100)    VENTILATION STATUS  Resp: (!) 27 (04/17/23 1100)  SpO2: 98 % (04/17/23 1100)           I & O (Last 24H):  Intake/Output Summary (Last 24 hours) at 4/17/2023 1248  Last data filed at 4/17/2023 1113  Gross per 24 hour   Intake 0 ml   Output 1300 ml   Net -1300 ml       WEIGHTS  Wt Readings from Last 3 Encounters:   04/16/23 1900 99 kg (218 lb 4.1 oz)   04/16/23 1332 95.3 kg (210 lb)   04/15/23 0530 97 kg (213 lb 13.5 oz)   04/15/23 0114 95.3 kg (210 lb)   04/03/23 0954  96.6 kg (213 lb)       PHYSICAL EXAM  GENERAL: well built elderly male in no apparent distress alert and oriented.   HEENT: Normocephalic. Pupils normal and conjunctivae normal.   NECK: No JVD. No bruit..   THYROID: Thyroid not examined  CARDIAC: Regular rate and rhythm. S1 is normal.S2 is normal.  2/6 systolic murmur  CHEST ANATOMY: normal.   LUNGS: Clear to auscultation. No wheezing or rhonchi..   ABDOMEN: Soft Normal bowel sounds.  Nontender  URINARY: No farrar catheter   EXTREMITIES: No cyanosis, clubbing or edema noted at this time.,  PERIPHERAL VASCULAR SYSTEM: Good palpable distal pulses.   CENTRAL NERVOUS SYSTEM: No focal motor or sensory deficits noted.   SKIN: Skin without lesions, moist, well perfused.   MUSCLE STRENGTH & TONE: No noteable weakness, atrophy or abnormal movement.     HOME MEDICATIONS:  Current Facility-Administered Medications on File Prior to Encounter   Medication Dose Route Frequency Provider Last Rate Last Admin    [DISCONTINUED] acetaminophen tablet 650 mg  650 mg Oral Q8H PRN Rochelle Bailey MD   650 mg at 04/16/23 1128    [DISCONTINUED] amiodarone tablet 200 mg  200 mg Oral TID Shirlene Oconnell NP        [DISCONTINUED] aspirin chewable tablet 81 mg  81 mg Oral Daily Rochelle Bailey MD   81 mg at 04/16/23 0815    [DISCONTINUED] butalbital-acetaminophen-caffeine -40 mg per tablet 1 tablet  1 tablet Oral Q4H PRN Rochelle Bailey MD        [DISCONTINUED] chlorhexidine 0.12 % solution 15 mL  15 mL Mouth/Throat BID Whit Dumont PharmD   15 mL at 04/16/23 0817    [DISCONTINUED] enoxaparin injection 40 mg  40 mg Subcutaneous Q24H Jair Leon MD        [DISCONTINUED] furosemide tablet 40 mg  40 mg Oral Daily Rochelle Bailey MD        [DISCONTINUED] lisinopriL tablet 20 mg  20 mg Oral Daily Rochelle Bailey MD   20 mg at 04/15/23 1136    [DISCONTINUED] LORazepam tablet 0.5 mg  0.5 mg Oral Q8H PRN Rochelle Bailey MD   0.5 mg at 04/16/23 0826    [DISCONTINUED] melatonin tablet 6 mg  6 mg Oral Nightly  PRANDERS Bailey MD   6 mg at 04/15/23 2007    [DISCONTINUED] mupirocin 2 % ointment   Nasal BID Whit Dumont, PharmD   1 g at 04/16/23 0817    [DISCONTINUED] nicotine 14 mg/24 hr 1 patch  1 patch Transdermal Daily Jair Leon MD   1 patch at 04/15/23 1731    [DISCONTINUED] nitroGLYCERIN SL tablet 0.4 mg  0.4 mg Sublingual Q5 Min PRANDERS Bailey MD        [DISCONTINUED] ondansetron injection 4 mg  4 mg Intravenous Q8H PRANDERS Bailey MD        [DISCONTINUED] pantoprazole EC tablet 40 mg  40 mg Oral Before breakfast Rochelle Bailey MD        [DISCONTINUED] polyethylene glycol packet 17 g  17 g Oral BID PRANDERS Bailey MD        [DISCONTINUED] sodium chloride 0.9% flush 10 mL  10 mL Intravenous PRANDERS Bailey MD        [DISCONTINUED] spironolactone tablet 25 mg  25 mg Oral Daily Rochelle Bailey MD   25 mg at 04/15/23 1135     Current Outpatient Medications on File Prior to Encounter   Medication Sig Dispense Refill    metoprolol succinate (TOPROL-XL) 50 MG 24 hr tablet Take 1 tablet (50 mg total) by mouth once daily. 90 tablet 3    amiodarone (PACERONE) 200 MG Tab Take 1 tablet (200 mg total) by mouth 3 (three) times daily. 90 tablet 11    aspirin 81 MG Chew Take 1 tablet (81 mg total) by mouth once daily.  0    butalbital-acetaminophen-caffeine -40 mg (FIORICET, ESGIC) -40 mg per tablet Take 1 tablet by mouth every 4 (four) hours as needed for Headaches. 20 tablet 0    furosemide (LASIX) 40 MG tablet Take 1 tablet (40 mg total) by mouth once daily. 90 tablet 3    lisinopriL (PRINIVIL,ZESTRIL) 20 MG tablet Take 1 tablet (20 mg total) by mouth once daily. 90 tablet 3    LORazepam (ATIVAN) 0.5 MG tablet Take 1 tablet (0.5 mg total) by mouth every 8 (eight) hours as needed for Anxiety. 30 tablet 0    nitroGLYCERIN (NITROSTAT) 0.4 MG SL tablet Place 1 tablet (0.4 mg total) under the tongue every 5 (five) minutes as needed for Chest pain. 25 tablet 6    pantoprazole (PROTONIX) 40 MG tablet Take 40 mg by  mouth once daily.      potassium chloride (KLOR-CON) 10 MEQ TbSR Take 1 tablet (10 mEq total) by mouth every other day. 45 tablet 3    spironolactone (ALDACTONE) 25 MG tablet Take 1 tablet (25 mg total) by mouth once daily. 90 tablet 3    tamsulosin (FLOMAX) 0.4 mg Cap Take 1 capsule by mouth once daily.         SCHEDULED MEDS:   aspirin  81 mg Oral Daily    chlorhexidine  15 mL Mouth/Throat BID    enoxaparin  40 mg Subcutaneous Daily    furosemide  40 mg Oral Daily    lisinopriL  20 mg Oral Daily    metoprolol succinate  50 mg Oral Daily    mupirocin   Nasal BID    pantoprazole  40 mg Oral Before breakfast    [START ON 4/18/2023] potassium chloride  10 mEq Oral Every other day    spironolactone  25 mg Oral Daily       CONTINUOUS INFUSIONS:        PRN MEDS:acetaminophen, butalbital-acetaminophen-caffeine -40 mg, LORazepam, magnesium oxide, magnesium oxide, melatonin, nitroGLYCERIN, ondansetron, polyethylene glycol, potassium bicarbonate, potassium bicarbonate, potassium bicarbonate    LABS AND DIAGNOSTICS     CBC LAST 3 DAYS  Recent Labs   Lab 04/15/23  0135 04/16/23  0357 04/16/23  1450 04/17/23  0449   WBC 8.73 6.60 8.07 5.95   RBC 4.24* 4.07* 4.26* 4.15*   HGB 13.1* 12.5* 13.2* 12.8*   HCT 39.7* 38.6* 40.0 38.5*   MCV 94 95 94 93   MCH 30.9 30.7 31.0 30.8   MCHC 33.0 32.4 33.0 33.2   RDW 15.3* 15.3* 15.3* 15.2*    187 190 199   MPV 11.1 11.5 11.1 11.0   GRAN 66.4  5.8  --  69.1  5.6 59.5  3.5   LYMPH 18.6  1.6  --  16.6*  1.3 26.2  1.6   MONO 9.7  0.9  --  9.8  0.8 8.7  0.5   BASO 0.08  --  0.08 0.06   NRBC 0  --  0 0       COAGULATION LAST 3 DAYS  Recent Labs   Lab 04/15/23  0135   INR 1.2       CHEMISTRY LAST 3 DAYS  Recent Labs   Lab 04/15/23  0135 04/16/23  0357 04/16/23  1450 04/17/23  0449    135* 137 136   K 3.9 4.1 4.3 3.8    108 106 108   CO2 22* 19* 22* 22*   ANIONGAP 13 8 9 6*   BUN 16 13 15 13   CREATININE 0.9 0.6 0.8 0.7   GLU 96 89 106 95   CALCIUM 8.9 8.8 8.9 8.6*    MG 2.0 2.2 2.0 2.2   ALBUMIN 4.1  --  3.7 3.8   PROT 7.4  --  7.3 7.3   ALKPHOS 80  --  75 76   ALT 22  --  21 23   AST 22  --  24 23   BILITOT 0.8  --  0.6 0.6       CARDIAC PROFILE LAST 3 DAYS  Recent Labs   Lab 04/15/23  0135 04/15/23  0518 04/15/23  0902 04/16/23  1450 04/16/23  1631   *  --   --  433*  --    TROPONINIHS 16.6*   < > 14.5 11.3 11.6    < > = values in this interval not displayed.       ENDOCRINE LAST 3 DAYS  Recent Labs   Lab 04/16/23  1450   TSH 8.440*       LAST ARTERIAL BLOOD GAS  ABG  No results for input(s): PH, PO2, PCO2, HCO3, BE in the last 168 hours.    LAST 7 DAYS MICROBIOLOGY   Microbiology Results (last 7 days)       ** No results found for the last 168 hours. **            MOST RECENT IMAGING  X-Ray Chest AP Portable  HISTORY: Chest Pain    FINDINGS: Portable chest radiograph at 1407 hours compared to prior exams including 04/15/2023 shows single-lead left subclavian CCD unchanged in position, with median sternotomy wires and mediastinal surgical clips. The cardiac silhouette is enlarged, stable in size, with normal pulmonary vascularity and aortic calcifications.    The lungs are normally expanded, with resolution of previous mild diffuse interstitial prominence. There is no consolidation, large pleural effusion, evidence of pulmonary edema, or pneumothorax.    IMPRESSION: Interval resolution of previous mild interstitial prominence, with no evidence of acute cardiopulmonary disease.    Electronically signed by:  Jose Roberto Salazar MD  4/16/2023 2:26 PM CDT Workstation: [id number]      ECHOCARDIOGRAM RESULTS (last 5)  Results for orders placed during the hospital encounter of 02/21/23    Echo    Interpretation Summary  · The left ventricle is mildly enlarged with moderate concentric hypertrophy evidence of old apical scar seen on apical four-chamber view  · The estimated ejection fraction is 34%.  · Grade III left ventricular diastolic dysfunction. Mean left atrial pressure 11  ·  There are segmental left ventricular wall motion abnormalities.  · Atrial fibrillation not observed.  · Normal right ventricular size with mildly reduced right ventricular systolic function.  · Mild-to-moderate aortic regurgitation.  · Severe mitral regurgitation.  · Intermediate central venous pressure (8 mmHg).  · The estimated PA systolic pressure is 30 mmHg. Mild pulmonary hypertension is present    Patient has sinus rhythm      CURRENT/PREVIOUS VISIT EKG  Results for orders placed or performed during the hospital encounter of 04/16/23   EKG 12-lead    Collection Time: 04/16/23  1:34 PM    Narrative    Test Reason : R07.9,    Vent. Rate : 069 BPM     Atrial Rate : 071 BPM     P-R Int : 204 ms          QRS Dur : 114 ms      QT Int : 442 ms       P-R-T Axes : 092 -06 123 degrees     QTc Int : 473 ms    nsr     Possible Anteroseptal infarct (cited on or before 18-DEC-2016)  Abnormal ECG  When compared with ECG of 15-APR-2023 01:35,  Current undetermined rhythm precludes rhythm comparison, needs review  Confirmed by Sohan HERNÁNDEZ, Erlin MANZANARES ([id number]) on 4/16/2023 8:06:32 PM    Referred By: [email or web address]   SELF           Confirmed By:Erlin Prary MD           ASSESSMENT/PLAN:     There are no active hospital problems to display for this patient.      ASSESSMENT & PLAN:     Ventricular tachycardia  Major depressive disorder  Hypertension  Chronic combined systolic and diastolic heart failure  CAD s/p CABG  H/o anterior MI 2001, LIMA to LAD 7/2004(reportedly 2 vessel bypass, need OR report to confirm), no dz in Lcx and RCA at that time      RECOMMENDATIONS:    Patient was discharged from hospital yesterday and presented to ED with nonsustained ventricular tachycardia.  Patient had further VT overnight.  Continue amiodarone drip for 24 hours.  We will transition to p.o. amiodarone thereafter.  Patient would benefit from following with EP specialist, Dr. Grace outpatient for further management of nonsustained VT.  At this time,  recommend cardiac catheterization next a.m. to rule out coronary blockages.  Discussed with patient the risks and benefits of the procedure including, but not limited to, the following 1:1000 risk of Heart attack, stroke and death with 3-5% Risk of bleeding, vessel damage, and the need for emergent CABG Surgery. All questions have been answered to patient's satisfaction. Informed consent obtained.  Will keep nothing by mouth post midnight and proceed with the coronary angiography possible PCI in the morning.   Continue Lasix 40 mg daily and spironolactone 25 mg daily.  Strict I&Os.  1.5 L fluid restriction.  2 g low-sodium heart healthy diet.    Recommend compliance with medication regimen.   Continue aspirin 81 mg daily. Continue lisinopril. Hold for SBP <100.   Continue to check and replace potassium and magnesium. Goal for potassium is 4.0, and goal for magnesium is 2.0.    Thank you for the consultation.  We will continue to follow .      Shirlene Oconnell NP  Department of Cardiology  Date of Service: 04/17/2023

## 2023-04-17 NOTE — NURSING
I went in to see this patient. He immediately told me that the amiodarone we were running on him is making him nauseated, making him go blind, giving him a headache and is trying to kill him. His hr is sinus michelle 58 at this time. I stopped his amiodarone and will contact the doctor to inform him that he is refusing it at this time.
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
## 2023-04-17 NOTE — H&P
"[facility] Medicine  History & Physical    Patient Name: Brandon Rivas  MRN: [id number]  Patient Class: IP- Inpatient  Admission Date: 4/16/2023  Attending Physician: Jair Leon MD  Primary Care Provider: James Garcia MD  DOS: 04/17/2023    Subjective:     Chief Complaint:   Chief Complaint   Patient presents with    Shortness of Breath     Pt is complaining of feeling like his heart was racing and he is SOB. Started 15 minutes ago        HPI:   67-year-old man with CAD status post stenting, hyperlipidemia, hypertension, chronic combined CHF status post AICD, tobacco use, presenting with chest pain and palpitations.  Patient reports last week AICD firing.  He has had intermittent palpitations with chest pain since, and they worsened today.  He measured his heart rate at 180 at home.   He presented to the ED where EKG showed V-tach versus SVT.  He was treated with 5 mg IV metoprolol, 2 g magnesium, and 150 mg amiodarone push.  Cardiology was contacted and he was started on amiodarone infusion.  He has gone in and out of wide complex tachycardia while in the ED with stable blood pressure and mentation.    Patient reports he has been very depressed and stressed about his medical problems and this tends to make his  heart rate increase.  He takes amiodarone but only once daily instead of twice because he is concerned about lung effects.  He is noncompliant with Lasix because it "tears up his insides".  He reports compliance with his metoprolol.  He continues to smoke cigars a few puffs a day.  He no longer uses alcohol.  He was recently treated for a UTI which he states is resolved and he had a urinalysis last week showing clearance.  He denies current chest pain, fever, chills, cough, dizziness, syncope, shortness of breath, vision changes.    The patient was given IV amiodarone infusion for ventricular tachycardia which improved without recurrence.  The patient had " interrogation of AICD which did not reveal any arrhythmias since last interrogation.  The patient's symptoms improved and clinically the patient improved.  The patient was transitioned to oral amiodarone although the patient was very resistant to taking amiodarone 3 times daily.  He agreed to take amiodarone and subsequently was discharged home.  During hospitalization he was intermittently refusing medications.  Psychiatry consultation was ordered but patient was discharged prior to being seen by Psychiatry.  After about 1 hour the patient return to the emergency room with palpitations.  He was found to have ventricular tachycardia.  Cardiology consultation was obtained.  He was started on IV amiodarone infusion and re- admitted to the hospital.    4/17:  Patient seen and examined.  No chest pain.  No shortness of breath.  Afebrile.  Blood pressure stable.  The patient has been intermittently refusing IV amiodarone infusion today.  Cardiology to relatively planning angiogram tomorrow.      Past Medical History:   Diagnosis Date    Coronary artery disease      Hypertension      MI (myocardial infarction)      Thyroid disease                 Past Surgical History:   Procedure Laterality Date    CARDIAC PACEMAKER PLACEMENT                   Review of patient's allergies indicates:   Allergen Reactions    Bactrim [sulfamethoxazole-trimethoprim] Shortness Of Breath    Statins-hmg-coa reductase inhibitors Other (See Comments)       Statin intolerance. Muscle weakness    Wellbutrin [bupropion hcl] Other (See Comments)       Abdominal pain            Family History         Problem Relation (Age of Onset)     Fibromyalgia Mother     Heart disease Father     Hyperlipidemia Father                   Tobacco Use    Smoking status: Every Day       Packs/day: 1.00       Years: 40.00       Pack years: 40.00       Types: Cigarettes    Smokeless tobacco: Never   Substance and Sexual Activity    Alcohol use: Yes       Comment: 12  pack beer daily    Drug use: No    Sexual activity: Not on file      Review of Systems Complete ROS otherwise negative other than stated in HPI.      Vital signs reviewed and stable   Physical exam   GENERAL:  Nontoxic nondiaphoretic, resting quietly  HEENT:  Moist mucous membranes, poor dentition  EYES:  No conjunctival discharge or scleral icterus  LUNGS:  Comfortable work of breathing, lungs are clear bilaterally  CARDIAC:  2+ radial pulses, regular rate and rhythm, AICD left chest  ABDOMEN:  Positive bowel sounds.  Soft, Nontender and nondistended.   SKIN:  Dry and warm with no jaundice  NEUROLOGIC:  Resting quietly  PSYCH:  Mood is calm, somewhat withdrawn    LABS:  Creatinine 0.7        Assessment/Plan:     Recurrent V-tach  Still having intermittent episodes.  Admit to ICU.  Continue  Amiodarone drip, Cardiology consult  Possibly triggered by medication noncompliance and stress      Moderate episode of recurrent major depressive disorder  Discussed initiating a low-dose SSRI with him at length.  He is very hesitant about medications, will consider it  Continue his anxiety medication as needed in the meantime      Chronic combined systolic and diastolic heart failure  Grade 3 diastolic dysfunction with EF 34% on last echo.  He has pulmonary edema on exam but refuses any Lasix.      PVD (peripheral vascular disease)        Tobacco dependence  Smokes a few puffs of cigar daily.  Refuses nicotine patch    Coronary artery disease        Hypertension  Blood pressure elevated.  Resume home oral medications and monitor        Plan update today:  Continue cardiology care  Appreciate consultants-cardiology & psychiatry   Continue IV amiodarone infusion.  Transition oral amiodarone as able.  Consider angiogram in near future  Consider EP referral in the future  Continue diuretics Lasix and Aldactone.  Monitor urine output and fluid status.    Continue medical management including aspirin and ACE inhibitor, titrate  regimen as needed  Continue PRN medications for anxiety.  Appreciate any input from Psychiatry.  Likely needs SSRI.  Continue supportive care measures   Continue home medications for chronic issues as able   Mobilize as able   Advance diet as tolerated   Serial labs, replace electrolytes as needed  GI prophylaxis with PPI  VTE prophylaxis with Lovenox  High risk secondary to severe exacerbation of chronic illness; acute illness with risk to life from ventricular tachycardia the setting of systolic CHF          Jair Leon MD  Department of Hospital Medicine  [facility]

## 2023-04-17 NOTE — CARE UPDATE
04/16/23 2051   Patient Assessment/Suction   Level of Consciousness (AVPU) alert   Respiratory Effort Normal   PRE-TX-O2   Device (Oxygen Therapy) room air   SpO2 99 %   Pulse Oximetry Type Continuous   $ Pulse Oximetry - Multiple Charge Pulse Oximetry - Multiple   Pulse 65   Resp (!) 27   Education   $ Education 15 min   Respiratory Evaluation   $ Care Plan Tech Time 15 min

## 2023-04-17 NOTE — PLAN OF CARE
Cm met with pt at bedside.  Explained the MOON.  Patient verbalized understanding the FENTON.  FENTON signed and scanned to .       04/17/23 1408   FENTON Message   Medicare Outpatient and Observation Notification regarding financial responsibility Explained to patient/caregiver;Signed/date by patient/caregiver   Date FENTON was signed 04/17/23   Time FENTON was signed [id number]

## 2023-04-17 NOTE — PLAN OF CARE
[facility]  Initial Discharge Assessment       Primary Care Provider: James Garcia MD    Admission Diagnosis: Chest pain [R07.9]    Admission Date: 4/16/2023  Expected Discharge Date: 4/20/2023    Cm met with pt at bedside.  Verified information on face sheet.  No dialysis, no coumadin, no nebulizer.  Patient stated that he lives alone but was recently staying at his brothers home a few days before this happen to him.  His plan is to discharge home by his brother for a few more days or discharge to his own home.  Patient brother will transport him home upon discharge.  No needs identified at this time.        Discharge Barriers Identified: None    Payor: MEDICARE / Plan: MEDICARE PART A & B / Product Type: Government /     Extended Emergency Contact Information  Primary Emergency Contact: Jerzy Rivas  Mobile Phone: [phone]  Relation: Brother  Secondary Emergency Contact: Josefina Rivas  Mobile Phone: [phone]  Relation: Sister   needed? No    Discharge Plan A: Home with family  Discharge Plan B: [facility] - [place], [place] - [address]  [address] [address]  Phone: [phone] Fax: [phone]    [facility] - [place], [place] - [address]  [address] [address]  Phone: [phone] Fax: [phone]      Initial Assessment (most recent)       Adult Discharge Assessment - 04/17/23 1309          Discharge Assessment    Assessment Type Discharge Planning Assessment     Confirmed/corrected address, phone number and insurance Yes     Confirmed Demographics Correct on Facesheet     Source of Information patient     Does patient/caregiver understand observation status Yes     Communicated LARY with patient/caregiver Date not available/Unable to determine     Reason For Admission No active principal problem     People in Home alone     Facility Arrived From: home     Do you expect to return to your  current living situation? Yes     Do you have help at home or someone to help you manage your care at home? No     Prior to hospitilization cognitive status: Unable to Assess     Current cognitive status: Alert/Oriented     Equipment Currently Used at Home none     Patient currently being followed by outpatient case management? No     Do you currently have service(s) that help you manage your care at home? Yes     Name and Contact number of agency pt does not remember the name     Is the pt/caregiver preference to resume services with current agency Yes     Do you take prescription medications? Yes     Do you have prescription coverage? Yes     Do you have any problems affording any of your prescribed medications? No     Is the patient taking medications as prescribed? yes     Who is going to help you get home at discharge? brother     How do you get to doctors appointments? car, drives self     Are you on dialysis? No     Do you take coumadin? No     Discharge Plan A Home with family     Discharge Plan B Home     DME Needed Upon Discharge  none     Discharge Plan discussed with: Patient     Discharge Barriers Identified None

## 2023-04-18 ENCOUNTER — CLINICAL SUPPORT (OUTPATIENT)
Dept: CARDIOLOGY | Facility: HOSPITAL | Age: 68
DRG: 309 | End: 2023-04-18
Payer: MEDICARE

## 2023-04-18 LAB
ALBUMIN SERPL BCP-MCNC: 4 G/DL (ref 3.5–5.2)
ALP SERPL-CCNC: 74 U/L (ref 55–135)
ALT SERPL W/O P-5'-P-CCNC: 22 U/L (ref 10–44)
ANION GAP SERPL CALC-SCNC: 6 MMOL/L (ref 8–16)
APTT PPP: 30.2 SEC (ref 21–32)
AST SERPL-CCNC: 18 U/L (ref 10–40)
BASOPHILS # BLD AUTO: 0.06 K/UL (ref 0–0.2)
BASOPHILS NFR BLD: 0.8 % (ref 0–1.9)
BILIRUB SERPL-MCNC: 0.5 MG/DL (ref 0.1–1)
BILIRUB UR QL STRIP: NEGATIVE
BUN SERPL-MCNC: 15 MG/DL (ref 8–23)
CALCIUM SERPL-MCNC: 9.2 MG/DL (ref 8.7–10.5)
CHLORIDE SERPL-SCNC: 106 MMOL/L (ref 95–110)
CLARITY UR: CLEAR
CO2 SERPL-SCNC: 25 MMOL/L (ref 23–29)
COLOR UR: YELLOW
CREAT SERPL-MCNC: 0.7 MG/DL (ref 0.5–1.4)
CV PHARM DOSE: 0.4 MG
CV STRESS BASE HR: 62 BPM
DIASTOLIC BLOOD PRESSURE: 86 MMHG
DIFFERENTIAL METHOD: ABNORMAL
EOSINOPHIL # BLD AUTO: 0.3 K/UL (ref 0–0.5)
EOSINOPHIL NFR BLD: 4.4 % (ref 0–8)
ERYTHROCYTE [DISTWIDTH] IN BLOOD BY AUTOMATED COUNT: 15.4 % (ref 11.5–14.5)
EST. GFR  (NO RACE VARIABLE): >60 ML/MIN/1.73 M^2
GLUCOSE SERPL-MCNC: 100 MG/DL (ref 70–110)
GLUCOSE UR QL STRIP: NEGATIVE
HCT VFR BLD AUTO: 40.4 % (ref 40–54)
HGB BLD-MCNC: 12.9 G/DL (ref 14–18)
HGB UR QL STRIP: NEGATIVE
IMM GRANULOCYTES # BLD AUTO: 0.03 K/UL (ref 0–0.04)
IMM GRANULOCYTES NFR BLD AUTO: 0.4 % (ref 0–0.5)
INR PPP: 1 (ref 0.8–1.2)
KETONES UR QL STRIP: NEGATIVE
LEUKOCYTE ESTERASE UR QL STRIP: NEGATIVE
LYMPHOCYTES # BLD AUTO: 1.9 K/UL (ref 1–4.8)
LYMPHOCYTES NFR BLD: 25.1 % (ref 18–48)
MAGNESIUM SERPL-MCNC: 2.1 MG/DL (ref 1.6–2.6)
MCH RBC QN AUTO: 30.1 PG (ref 27–31)
MCHC RBC AUTO-ENTMCNC: 31.9 G/DL (ref 32–36)
MCV RBC AUTO: 94 FL (ref 82–98)
MONOCYTES # BLD AUTO: 0.7 K/UL (ref 0.3–1)
MONOCYTES NFR BLD: 9 % (ref 4–15)
NEUTROPHILS # BLD AUTO: 4.5 K/UL (ref 1.8–7.7)
NEUTROPHILS NFR BLD: 60.3 % (ref 38–73)
NITRITE UR QL STRIP: NEGATIVE
NRBC BLD-RTO: 0 /100 WBC
OHS CV CPX 85 PERCENT MAX PREDICTED HEART RATE MALE: 130
OHS CV CPX MAX PREDICTED HEART RATE: 153
OHS CV CPX PATIENT IS FEMALE: 0
OHS CV CPX PATIENT IS MALE: 1
OHS CV CPX PEAK DIASTOLIC BLOOD PRESSURE: 78 MMHG
OHS CV CPX PEAK HEAR RATE: 75 BPM
OHS CV CPX PEAK RATE PRESSURE PRODUCT: NORMAL
OHS CV CPX PEAK SYSTOLIC BLOOD PRESSURE: 139 MMHG
OHS CV CPX PERCENT MAX PREDICTED HEART RATE ACHIEVED: 49
OHS CV CPX RATE PRESSURE PRODUCT PRESENTING: 7626
PH UR STRIP: 6 [PH] (ref 5–8)
PLATELET # BLD AUTO: 202 K/UL (ref 150–450)
PMV BLD AUTO: 11.1 FL (ref 9.2–12.9)
POTASSIUM SERPL-SCNC: 4.1 MMOL/L (ref 3.5–5.1)
PROT SERPL-MCNC: 7.8 G/DL (ref 6–8.4)
PROT UR QL STRIP: NEGATIVE
PROTHROMBIN TIME: 11.2 SEC (ref 9–12.5)
RBC # BLD AUTO: 4.28 M/UL (ref 4.6–6.2)
SODIUM SERPL-SCNC: 137 MMOL/L (ref 136–145)
SP GR UR STRIP: 1.02 (ref 1–1.03)
SYSTOLIC BLOOD PRESSURE: 123 MMHG
URN SPEC COLLECT METH UR: NORMAL
UROBILINOGEN UR STRIP-ACNC: NEGATIVE EU/DL
WBC # BLD AUTO: 7.42 K/UL (ref 3.9–12.7)

## 2023-04-18 PROCEDURE — 93016 NUCLEAR STRESS TEST (CUPID ONLY): ICD-10-PCS | Mod: ,,, | Performed by: SPECIALIST

## 2023-04-18 PROCEDURE — 83735 ASSAY OF MAGNESIUM: CPT | Performed by: INTERNAL MEDICINE

## 2023-04-18 PROCEDURE — 25000003 PHARM REV CODE 250: Performed by: INTERNAL MEDICINE

## 2023-04-18 PROCEDURE — 21400001 HC TELEMETRY ROOM

## 2023-04-18 PROCEDURE — 85025 COMPLETE CBC W/AUTO DIFF WBC: CPT | Performed by: INTERNAL MEDICINE

## 2023-04-18 PROCEDURE — 93016 CV STRESS TEST SUPVJ ONLY: CPT | Mod: ,,, | Performed by: SPECIALIST

## 2023-04-18 PROCEDURE — 25000003 PHARM REV CODE 250

## 2023-04-18 PROCEDURE — 85610 PROTHROMBIN TIME: CPT

## 2023-04-18 PROCEDURE — 63600175 PHARM REV CODE 636 W HCPCS: Performed by: INTERNAL MEDICINE

## 2023-04-18 PROCEDURE — 80053 COMPREHEN METABOLIC PANEL: CPT | Performed by: INTERNAL MEDICINE

## 2023-04-18 PROCEDURE — 99233 SBSQ HOSP IP/OBS HIGH 50: CPT | Mod: ,,, | Performed by: INTERNAL MEDICINE

## 2023-04-18 PROCEDURE — 99233 PR SUBSEQUENT HOSPITAL CARE,LEVL III: ICD-10-PCS | Mod: ,,, | Performed by: INTERNAL MEDICINE

## 2023-04-18 PROCEDURE — 85730 THROMBOPLASTIN TIME PARTIAL: CPT

## 2023-04-18 PROCEDURE — 93017 CV STRESS TEST TRACING ONLY: CPT

## 2023-04-18 PROCEDURE — 36415 COLL VENOUS BLD VENIPUNCTURE: CPT

## 2023-04-18 PROCEDURE — 93018 NUCLEAR STRESS TEST (CUPID ONLY): ICD-10-PCS | Mod: ,,, | Performed by: SPECIALIST

## 2023-04-18 PROCEDURE — 81003 URINALYSIS AUTO W/O SCOPE: CPT | Performed by: INTERNAL MEDICINE

## 2023-04-18 PROCEDURE — 93018 CV STRESS TEST I&R ONLY: CPT | Mod: ,,, | Performed by: SPECIALIST

## 2023-04-18 RX ORDER — AMIODARONE HYDROCHLORIDE 200 MG/1
200 TABLET ORAL 3 TIMES DAILY
Status: DISCONTINUED | OUTPATIENT
Start: 2023-04-18 | End: 2023-04-19

## 2023-04-18 RX ORDER — SODIUM CHLORIDE 9 MG/ML
INJECTION, SOLUTION INTRAVENOUS ONCE
Status: COMPLETED | OUTPATIENT
Start: 2023-04-18 | End: 2023-04-18

## 2023-04-18 RX ORDER — REGADENOSON 0.08 MG/ML
0.4 INJECTION, SOLUTION INTRAVENOUS
Status: DISCONTINUED | OUTPATIENT
Start: 2023-04-18 | End: 2023-04-19 | Stop reason: HOSPADM

## 2023-04-18 RX ADMIN — AMIODARONE HYDROCHLORIDE 200 MG: 200 TABLET ORAL at 09:04

## 2023-04-18 RX ADMIN — ASPIRIN 81 MG 81 MG: 81 TABLET ORAL at 08:04

## 2023-04-18 RX ADMIN — ACETAMINOPHEN 650 MG: 325 TABLET ORAL at 02:04

## 2023-04-18 RX ADMIN — ACETAMINOPHEN 650 MG: 325 TABLET ORAL at 03:04

## 2023-04-18 RX ADMIN — AMIODARONE HYDROCHLORIDE 0.5 MG/MIN: 1.8 INJECTION, SOLUTION INTRAVENOUS at 08:04

## 2023-04-18 RX ADMIN — ACETAMINOPHEN 650 MG: 325 TABLET ORAL at 08:04

## 2023-04-18 RX ADMIN — METOPROLOL SUCCINATE 50 MG: 50 TABLET, FILM COATED, EXTENDED RELEASE ORAL at 08:04

## 2023-04-18 RX ADMIN — LORAZEPAM 0.5 MG: 0.5 TABLET ORAL at 09:04

## 2023-04-18 RX ADMIN — MUPIROCIN 1 G: 20 OINTMENT TOPICAL at 08:04

## 2023-04-18 RX ADMIN — MELATONIN 9 MG: at 09:04

## 2023-04-18 RX ADMIN — CHLORHEXIDINE GLUCONATE 15 ML: 1.2 RINSE ORAL at 08:04

## 2023-04-18 RX ADMIN — SODIUM CHLORIDE: 0.9 INJECTION, SOLUTION INTRAVENOUS at 08:04

## 2023-04-18 RX ADMIN — LORAZEPAM 0.5 MG: 0.5 TABLET ORAL at 03:04

## 2023-04-18 RX ADMIN — LORAZEPAM 0.5 MG: 0.5 TABLET ORAL at 08:04

## 2023-04-18 RX ADMIN — AMIODARONE HYDROCHLORIDE 200 MG: 200 TABLET ORAL at 03:04

## 2023-04-18 RX ADMIN — LORAZEPAM 0.5 MG: 0.5 TABLET ORAL at 02:04

## 2023-04-18 RX ADMIN — PANTOPRAZOLE SODIUM 40 MG: 40 TABLET, DELAYED RELEASE ORAL at 02:04

## 2023-04-18 NOTE — PROGRESS NOTES
[facility]  Department of Cardiology  Consult Note      PATIENT NAME: Brandon Rivas  MRN: [id number]  TODAY'S DATE: 04/18/2023  ADMIT DATE: 4/16/2023                          CONSULT REQUESTED BY: Jair Leon MD    SUBJECTIVE     PRINCIPAL PROBLEM: <principal problem not specified>      REASON FOR CONSULT:   Ventricular Tachycardia    INTERVAL HISTORY    4/18/23  Patient is exam during nuclear stress test patient complained of dizziness, chest discomfort and shortness of breath during stress portion of test.  Symptoms resolved quickly.  Per RN patient has been refusing his p.o. medications, Lasix, spironolactone, and lisinopril.  No acute changes noted on EKG.  Patient was sinus rhythm overnight with no events on telemetry.  No further VT.      4/17/23    Patient was discharged yesterday afternoon at approximately 1:00 a.m. later re-presented to the ED with complaints of persistent shortness of breath, palpitations, impending doom and he was found to be in nonsustained VT.  Symptoms lasted approximately 5-10 minutes.  Patient had VT overnight as well.  Patient has refused amiodarone drip this morning but did receive amiodarone at 1 milligram/hour overnight.  Patient states amiodarone gives him dizziness and feelings of shortness of breath.  No acute distress during examination.  Sinus rhythm on the monitor.  98% on room air.  Patient appears anxious.    4/16/23    Patient is resting comfortably in bed during examination, sinus rhythm on the monitor heart rate 56.  Blood pressure 118/67.  2 L nasal cannula patient was still on amiodarone drip at 0.5 milligrams/hour.  No acute complaints at this time.  Mild dizziness.  No events on telemetry.      HPI:    Patient is a 67-year-old male with CAD s/p stent, hyperlipidemia, hypertension, chronic combined CHF s/p AICD who presented to the ED with chest pain and palpitations.  AICD fired last week with increased palpitations and chest discomfort  "since.  Heart rate at home was 180.  Patient was found to be in VT versus SVT and was treated with 5 mg of IV metoprolol, 2 g magnesium, and 150 mg amiodarone push.  Amiodarone infusion was initiated.  Patient is anxious during examination.  He was stress about amiodarone as he is concerned it will cause lung problems.  Questionable compliance with home medications.    During examination he was anxious, in sinus rhythm, and on amiodarone drip.  No edema.  No acute distress.            FROM H&P   Chest Pain       Chest pain x 1 day. States heart rate was in 180's.  Took 4 ntg at home.          HPI: 67-year-old man with CAD status post stenting, hyperlipidemia, hypertension, chronic combined CHF status post AICD, tobacco use, presenting with chest pain and palpitations.  Patient reports last week AICD firing.  He has had intermittent palpitations with chest pain since, and they worsened today.  He measured his heart rate at 180 at home.   He presented to the ED where EKG showed V-tach versus SVT.  He was treated with 5 mg IV metoprolol, 2 g magnesium, and 150 mg amiodarone push.  Cardiology was contacted and he was started on amiodarone infusion.  He has gone in and out of wide complex tachycardia while in the ED with stable blood pressure and mentation.    Patient reports he has been very depressed and stressed about his medical problems and this tends to make his  heart rate increase.  He takes amiodarone but only once daily instead of twice because he is concerned about lung effects.  He is noncompliant with Lasix because it "tears up his insides".  He reports compliance with his metoprolol.  He continues to smoke cigars a few puffs a day.  He no longer uses alcohol.  He was recently treated for a UTI which he states is resolved and he had a urinalysis last week showing clearance.  He denies current chest pain, fever, chills, cough, dizziness, syncope, shortness of breath, vision changes.         Review of " patient's allergies indicates:   Allergen Reactions    Bactrim [sulfamethoxazole-trimethoprim] Shortness Of Breath    Statins-hmg-coa reductase inhibitors Other (See Comments)     Statin intolerance. Muscle weakness    Wellbutrin [bupropion hcl] Other (See Comments)     Abdominal pain        Past Medical History:   Diagnosis Date    Coronary artery disease     Hypertension     MI (myocardial infarction)     Thyroid disease      Past Surgical History:   Procedure Laterality Date    CARDIAC PACEMAKER PLACEMENT       Social History     Tobacco Use    Smoking status: Every Day     Packs/day: 1.00     Years: 40.00     Pack years: 40.00     Types: Cigarettes    Smokeless tobacco: Never   Substance Use Topics    Alcohol use: Yes     Comment: 12 pack beer daily    Drug use: No        REVIEW OF SYSTEMS  CONSTITUTIONAL: Negative for chills, fatigue and fever.   EYES: No double vision, No blurred vision  NEURO: No headaches, positive dizziness  RESPIRATORY: + shortness of breath with exertion and prolonged talking Negative for cough, shortness of breath and wheezing.    CARDIOVASCULAR:  Positive for chest pain-improved.  Positive for palpitations-improved and negative leg swelling.   GI: Negative for abdominal pain, No melena, diarrhea, nausea and vomiting.   : Negative for dysuria and frequency, Negative for hematuria  SKIN: Negative for bruising, Negative for edema or discoloration noted.   ENDOCRINE: Negative for polyphagia, Negative for heat intolerance, Negative for cold intolerance  PSYCHIATRIC: Negative for depression, + for anxiety, Negative for memory loss  MUSCULOSKELETAL: Negative for neck pain, Negative for muscle weakness, Negative for back pain     OBJECTIVE     VITAL SIGNS (Most Recent)  Temp: 97.9 °F (36.6 °C) (04/18/23 0701)  Pulse: (!) 56 (04/18/23 1300)  Resp: (!) 30 (04/18/23 1300)  BP: 128/80 (04/18/23 1300)  SpO2: 96 % (04/18/23 1300)    VENTILATION STATUS  Resp: (!) 30 (04/18/23 1300)  SpO2: 96 %  (04/18/23 1300)           I & O (Last 24H):  Intake/Output Summary (Last 24 hours) at 4/18/2023 1358  Last data filed at 4/18/2023 0900  Gross per 24 hour   Intake 723.34 ml   Output 1125 ml   Net -401.66 ml       WEIGHTS  Wt Readings from Last 3 Encounters:   04/16/23 1900 99 kg (218 lb 4.1 oz)   04/16/23 1332 95.3 kg (210 lb)   04/15/23 0530 97 kg (213 lb 13.5 oz)   04/15/23 0114 95.3 kg (210 lb)   04/03/23 0954 96.6 kg (213 lb)       PHYSICAL EXAM  GENERAL: well built elderly male in no apparent distress alert and oriented.   HEENT: Normocephalic. Pupils normal and conjunctivae normal.   NECK: No JVD. No bruit..   THYROID: Thyroid not examined  CARDIAC: Regular rate and rhythm. S1 is normal.S2 is normal.  2/6 systolic murmur  CHEST ANATOMY: normal.   LUNGS: Clear to auscultation. No wheezing or rhonchi..   ABDOMEN: Soft Normal bowel sounds.  Nontender  URINARY: No farrar catheter   EXTREMITIES: No cyanosis, clubbing or edema noted at this time.,  PERIPHERAL VASCULAR SYSTEM: Good palpable distal pulses.   CENTRAL NERVOUS SYSTEM: No focal motor or sensory deficits noted.   SKIN: Skin without lesions, moist, well perfused.   MUSCLE STRENGTH & TONE: No noteable weakness, atrophy or abnormal movement.     HOME MEDICATIONS:  No current facility-administered medications on file prior to encounter.     Current Outpatient Medications on File Prior to Encounter   Medication Sig Dispense Refill    metoprolol succinate (TOPROL-XL) 50 MG 24 hr tablet Take 1 tablet (50 mg total) by mouth once daily. 90 tablet 3    amiodarone (PACERONE) 200 MG Tab Take 1 tablet (200 mg total) by mouth 3 (three) times daily. 90 tablet 11    aspirin 81 MG Chew Take 1 tablet (81 mg total) by mouth once daily.  0    butalbital-acetaminophen-caffeine -40 mg (FIORICET, ESGIC) -40 mg per tablet Take 1 tablet by mouth every 4 (four) hours as needed for Headaches. 20 tablet 0    furosemide (LASIX) 40 MG tablet Take 1 tablet (40 mg total) by  mouth once daily. 90 tablet 3    lisinopriL (PRINIVIL,ZESTRIL) 20 MG tablet Take 1 tablet (20 mg total) by mouth once daily. 90 tablet 3    LORazepam (ATIVAN) 0.5 MG tablet Take 1 tablet (0.5 mg total) by mouth every 8 (eight) hours as needed for Anxiety. 30 tablet 0    nitroGLYCERIN (NITROSTAT) 0.4 MG SL tablet Place 1 tablet (0.4 mg total) under the tongue every 5 (five) minutes as needed for Chest pain. 25 tablet 6    pantoprazole (PROTONIX) 40 MG tablet Take 40 mg by mouth once daily.      potassium chloride (KLOR-CON) 10 MEQ TbSR Take 1 tablet (10 mEq total) by mouth every other day. 45 tablet 3    spironolactone (ALDACTONE) 25 MG tablet Take 1 tablet (25 mg total) by mouth once daily. 90 tablet 3    tamsulosin (FLOMAX) 0.4 mg Cap Take 1 capsule by mouth once daily.         SCHEDULED MEDS:   aspirin  81 mg Oral Daily    chlorhexidine  15 mL Mouth/Throat BID    enoxaparin  40 mg Subcutaneous Daily    furosemide  40 mg Oral Daily    lisinopriL  20 mg Oral Daily    metoprolol succinate  50 mg Oral Daily    mupirocin   Nasal BID    pantoprazole  40 mg Oral Before breakfast    potassium chloride  10 mEq Oral Every other day    spironolactone  25 mg Oral Daily       CONTINUOUS INFUSIONS:   amiodarone in dextrose 5% Stopped (04/18/23 0900)         PRN MEDS:acetaminophen, butalbital-acetaminophen-caffeine -40 mg, LORazepam, magnesium oxide, magnesium oxide, melatonin, nitroGLYCERIN, ondansetron, polyethylene glycol, potassium bicarbonate, potassium bicarbonate, potassium bicarbonate, sodium chloride 0.9%    LABS AND DIAGNOSTICS     CBC LAST 3 DAYS  Recent Labs   Lab 04/16/23  1450 04/17/23  0449 04/18/23  0526   WBC 8.07 5.95 7.42   RBC 4.26* 4.15* 4.28*   HGB 13.2* 12.8* 12.9*   HCT 40.0 38.5* 40.4   MCV 94 93 94   MCH 31.0 30.8 30.1   MCHC 33.0 33.2 31.9*   RDW 15.3* 15.2* 15.4*    199 202   MPV 11.1 11.0 11.1   GRAN 69.1  5.6 59.5  3.5 60.3  4.5   LYMPH 16.6*  1.3 26.2  1.6 25.1  1.9   MONO 9.8   0.8 8.7  0.5 9.0  0.7   BASO 0.08 0.06 0.06   NRBC 0 0 0       COAGULATION LAST 3 DAYS  Recent Labs   Lab 04/15/23  0135 04/18/23  0527   INR 1.2 1.0   APTT  --  30.2       CHEMISTRY LAST 3 DAYS  Recent Labs   Lab 04/16/23  1450 04/17/23  0449 04/18/23  0526    136 137   K 4.3 3.8 4.1    108 106   CO2 22* 22* 25   ANIONGAP 9 6* 6*   BUN 15 13 15   CREATININE 0.8 0.7 0.7    95 100   CALCIUM 8.9 8.6* 9.2   MG 2.0 2.2 2.1   ALBUMIN 3.7 3.8 4.0   PROT 7.3 7.3 7.8   ALKPHOS 75 76 74   ALT 21 23 22   AST 24 23 18   BILITOT 0.6 0.6 0.5       CARDIAC PROFILE LAST 3 DAYS  Recent Labs   Lab 04/15/23  0135 04/15/23  0518 04/15/23  0902 04/16/23  1450 04/16/23  1631   *  --   --  433*  --    TROPONINIHS 16.6*   < > 14.5 11.3 11.6    < > = values in this interval not displayed.       ENDOCRINE LAST 3 DAYS  Recent Labs   Lab 04/16/23  1450   TSH 8.440*       LAST ARTERIAL BLOOD GAS  ABG  No results for input(s): PH, PO2, PCO2, HCO3, BE in the last 168 hours.    LAST 7 DAYS MICROBIOLOGY   Microbiology Results (last 7 days)       ** No results found for the last 168 hours. **            MOST RECENT IMAGING  NM Myocardial Perfusion Spect Multi Pharmacologic  HISTORY: ECG abnormal, intermediate CAD risk  hypertension, chest pain, dyspnea, dizziness, previous myocardial infarction.    TECHNIQUE:  11 mCi technetium 99m Myoview was administered IV for the rest portion of the exam, with 27 mCi for the stress portion of the exam, following a 1 day protocol.  The patient was stressed with Lexiscan 0.4 mg IV, and achieved a maximum heart rate of 75 beats per minute.    FINDINGS:  Comparison to the prior exam of 12/20/2016. There is a large fixed hypokinetic defect involving the apex and the anterior wall of the left ventricle, with no reversible photopenic defects to suggest pharmacologically induced reversible ischemia.    The left ventricle is dilated. Ejection fraction is calculated at 36%.  The polar map  images confirm the planar SPECT findings.    IMPRESSION:  1. No evidence of pharmacologically induced reversible ischemia.  2. Large fixed hypokinetic defect in the apex and anterior wall, with dilated left ventricle, and calculated ejection fraction of 36%.    Electronically signed by:  Jose Roberto Salazar MD  4/18/2023 12:42 PM CDT Workstation: [id number]      ECHOCARDIOGRAM RESULTS (last 5)  Results for orders placed during the hospital encounter of 02/21/23    Echo    Interpretation Summary  · The left ventricle is mildly enlarged with moderate concentric hypertrophy evidence of old apical scar seen on apical four-chamber view  · The estimated ejection fraction is 34%.  · Grade III left ventricular diastolic dysfunction. Mean left atrial pressure 11  · There are segmental left ventricular wall motion abnormalities.  · Atrial fibrillation not observed.  · Normal right ventricular size with mildly reduced right ventricular systolic function.  · Mild-to-moderate aortic regurgitation.  · Severe mitral regurgitation.  · Intermediate central venous pressure (8 mmHg).  · The estimated PA systolic pressure is 30 mmHg. Mild pulmonary hypertension is present    Patient has sinus rhythm      CURRENT/PREVIOUS VISIT EKG  Results for orders placed or performed during the hospital encounter of 04/16/23   EKG 12-lead    Collection Time: 04/16/23  1:34 PM    Narrative    Test Reason : R07.9,    Vent. Rate : 069 BPM     Atrial Rate : 071 BPM     P-R Int : 204 ms          QRS Dur : 114 ms      QT Int : 442 ms       P-R-T Axes : 092 -06 123 degrees     QTc Int : 473 ms    nsr     Possible Anteroseptal infarct (cited on or before 18-DEC-2016)  Abnormal ECG  When compared with ECG of 15-APR-2023 01:35,  Current undetermined rhythm precludes rhythm comparison, needs review  Confirmed by Erlin Parry MD ([id number]) on 4/16/2023 8:06:32 PM    Referred By: [email or web address]   SELF           Confirmed By:Erlin Parry MD           ASSESSMENT/PLAN:      There are no active hospital problems to display for this patient.      ASSESSMENT & PLAN:     Ventricular tachycardia s/p ICD discharge  HFrEF s/p ICD  CAD s/p CABG  H/o anterior MI 2001, LIMA to LAD 7/2004(reportedly 2 vessel bypass, need OR report to confirm), no dz in Lcx and RCA at that time  Severe MR, re-eval once on GDMT      RECOMMENDATIONS:    No further VT on telemetry.   Transition to PO amiodarone 200 mg TID.   Nuclear stress test negative for reversible ischemia.  EKG portion of test is pending.   Patient is continuing to refuse medication.  Encouraged patient to take medications as prescribed. Continue Lasix 40 mg daily and spironolactone 25 mg daily.  Strict I&Os.  1.5 L fluid restriction.  2 g low-sodium heart healthy diet.    Recommend compliance with medication regimen.   Continue aspirin 81 mg daily. Continue lisinopril. Hold for SBP <100.   Recent ECHO shows severe MR, moderate AR, EF 34% with grade III diastolic dysfunction. Strict I's and O's. 1.5L fluid restriction. 2g low sodium diet.   Continue to check and replace potassium and magnesium. Goal for potassium is 4.0, and goal for magnesium is 2.0.    Thank you for the consultation.  We will continue to follow.      Shirlene Oconnell NP  Department of Cardiology  Date of Service: 04/18/2023

## 2023-04-18 NOTE — PROGRESS NOTES
"[facility]  Progress Note    Patient Name: Brandon Rivas  MRN: [id number]  Patient Class: IP- Inpatient  Admission Date: 4/16/2023  Attending Physician: Jair Leon MD  Primary Care Provider: James Garcia MD  DOS: 04/18/2023    Subjective:     Chief Complaint:   Chief Complaint   Patient presents with    Shortness of Breath     Pt is complaining of feeling like his heart was racing and he is SOB. Started 15 minutes ago        HPI:   67-year-old man with CAD status post stenting, hyperlipidemia, hypertension, chronic combined CHF status post AICD, tobacco use, presenting with chest pain and palpitations.  Patient reports last week AICD firing.  He has had intermittent palpitations with chest pain since, and they worsened today.  He measured his heart rate at 180 at home.   He presented to the ED where EKG showed V-tach versus SVT.  He was treated with 5 mg IV metoprolol, 2 g magnesium, and 150 mg amiodarone push.  Cardiology was contacted and he was started on amiodarone infusion.  He has gone in and out of wide complex tachycardia while in the ED with stable blood pressure and mentation.    Patient reports he has been very depressed and stressed about his medical problems and this tends to make his  heart rate increase.  He takes amiodarone but only once daily instead of twice because he is concerned about lung effects.  He is noncompliant with Lasix because it "tears up his insides".  He reports compliance with his metoprolol.  He continues to smoke cigars a few puffs a day.  He no longer uses alcohol.  He was recently treated for a UTI which he states is resolved and he had a urinalysis last week showing clearance.  He denies current chest pain, fever, chills, cough, dizziness, syncope, shortness of breath, vision changes.    The patient was given IV amiodarone infusion for ventricular tachycardia which improved without recurrence.  The patient had " interrogation of AICD which did not reveal any arrhythmias since last interrogation.  The patient's symptoms improved and clinically the patient improved.  The patient was transitioned to oral amiodarone although the patient was very resistant to taking amiodarone 3 times daily.  He agreed to take amiodarone and subsequently was discharged home.  During hospitalization he was intermittently refusing medications.  Psychiatry consultation was ordered but patient was discharged prior to being seen by Psychiatry.  After about 1 hour the patient return to the emergency room with palpitations.  He was found to have ventricular tachycardia.  Cardiology consultation was obtained.  He was started on IV amiodarone infusion and re- admitted to the hospital.    4/17:  Patient seen and examined.  No chest pain.  No shortness of breath.  Afebrile.  Blood pressure stable.  The patient has been intermittently refusing IV amiodarone infusion today.  Cardiology to relatively planning angiogram tomorrow.    Interval history:  4/18: Patient seen and examined.  Patient reports persistent intermittent palpitations.  No chest pain or shortness of breath.  He has persistent generalized weakness.  He has been tolerating IV amiodarone infusion today without significant adverse effect.  Patient tolerated stress test well today.  He is very anxious about recurrent episodes of V-tach.  He knows that his anxiety is driving his symptoms.  He met with Psychiatry but is resistant to starting SSRI.    Review of Systems:  3 point review of systems reviewed and negative except as per interval history above    Vitals:    04/18/23 0900 04/18/23 1300 04/18/23 1500 04/18/23 1700   BP: 132/81 128/80 125/72 122/75   Pulse: 60 (!) 56 60 62   Resp: 16 (!) 30 (!) 29 (!) 38   Temp:   97.8 °F (36.6 °C)    TempSrc:       SpO2: 96% 96% 95% 96%   Weight:       Height:             Physical exam   GENERAL:  Nontoxic nondiaphoretic, resting quietly  HEENT:  Moist  mucous membranes, poor dentition  EYES:  No conjunctival discharge or scleral icterus  LUNGS:  Comfortable work of breathing, lungs are clear bilaterally  CARDIAC:  2+ radial pulses, regular rate and rhythm, AICD left chest  ABDOMEN:  Positive bowel sounds.  Soft, Nontender and nondistended.   SKIN:  Dry and warm with no jaundice  NEUROLOGIC:  Awake, alert and oriented, fluent speech, follows commands appropriately  PSYCH:  Mood is anxious, insight fair, affect normal    LABS:  Creatinine 0.7  Hemoglobin 12   Potassium 4.1    Nuclear stress testing negative for reversible ischemia        Assessment/Plan:     Recurrent V-tach  Still having intermittent episodes.  Admit to ICU.  Continue  Amiodarone drip, Cardiology consult  Possibly triggered by medication noncompliance and stress      Moderate episode of recurrent major depressive disorder  Discussed initiating a low-dose SSRI with him at length.  He is very hesitant about medications, will consider it  Continue his anxiety medication as needed in the meantime      Chronic combined systolic and diastolic heart failure  Grade 3 diastolic dysfunction with EF 34% on last echo.  He has pulmonary edema on exam but refuses any Lasix.      PVD (peripheral vascular disease)        Tobacco dependence  Smokes a few puffs of cigar daily.  Refuses nicotine patch    Coronary artery disease        Hypertension  Blood pressure elevated.  Resume home oral medications and monitor        Plan update today:  Continue care on telemetry.  Appreciate consultants-cardiology & psychiatry   Continue IV amiodarone infusion.  Transition oral amiodarone.   Nuclear stress testing performed today and negative  Will need EP referral at discharge  Continue diuretics Lasix and Aldactone.  Monitor urine output and fluid status.    Continue medical management including aspirin and ACE inhibitor, titrate regimen as needed  Continue PRN medications for anxiety.  Appreciate any input from Psychiatry.   Likely needs SSRI.  Continue supportive care measures   Continue home medications for chronic issues as able   Mobilize as able   Advance diet as tolerated   Serial labs, replace electrolytes as needed  GI prophylaxis with PPI  VTE prophylaxis with Lovenox  High risk secondary to severe exacerbation of chronic illness; acute illness with risk to life from ventricular tachycardia the setting of systolic CHF  Possible discharge next 24-48 hours if remains stable      Jair Leon MD  Department of Hospital Medicine  [facility]

## 2023-04-18 NOTE — NURSING
This patient refused his lovenox shot this evening. I explained to him in detail why he should take his lovenox injection but he still refused. He states that his stomach hurts from previous injections and that he thinks he received enough of them at this time. I sent the doctor a secure chat concerning this.

## 2023-04-18 NOTE — CARE UPDATE
04/17/23 2048   Patient Assessment/Suction   Level of Consciousness (AVPU) alert   Respiratory Effort Normal   PRE-TX-O2   Device (Oxygen Therapy) room air   SpO2 95 %   Pulse Oximetry Type Continuous   $ Pulse Oximetry - Multiple Charge Pulse Oximetry - Multiple   Pulse 60   Resp 20   Respiratory Evaluation   $ Care Plan Tech Time 15 min

## 2023-04-19 LAB
ALBUMIN SERPL BCP-MCNC: 4 G/DL (ref 3.5–5.2)
ALP SERPL-CCNC: 73 U/L (ref 55–135)
ALT SERPL W/O P-5'-P-CCNC: 20 U/L (ref 10–44)
ANION GAP SERPL CALC-SCNC: 16 MMOL/L (ref 8–16)
ANION GAP SERPL CALC-SCNC: 5 MMOL/L (ref 8–16)
AST SERPL-CCNC: 19 U/L (ref 10–40)
BASOPHILS # BLD AUTO: 0.06 K/UL (ref 0–0.2)
BASOPHILS NFR BLD: 0.9 % (ref 0–1.9)
BILIRUB SERPL-MCNC: 0.5 MG/DL (ref 0.1–1)
BUN SERPL-MCNC: 13 MG/DL (ref 8–23)
BUN SERPL-MCNC: 14 MG/DL (ref 8–23)
CALCIUM SERPL-MCNC: 8.9 MG/DL (ref 8.7–10.5)
CALCIUM SERPL-MCNC: 9.6 MG/DL (ref 8.7–10.5)
CHLORIDE SERPL-SCNC: 109 MMOL/L (ref 95–110)
CHLORIDE SERPL-SCNC: 98 MMOL/L (ref 95–110)
CO2 SERPL-SCNC: 22 MMOL/L (ref 23–29)
CO2 SERPL-SCNC: 24 MMOL/L (ref 23–29)
CREAT SERPL-MCNC: 0.7 MG/DL (ref 0.5–1.4)
CREAT SERPL-MCNC: 0.8 MG/DL (ref 0.5–1.4)
DIFFERENTIAL METHOD: ABNORMAL
EOSINOPHIL # BLD AUTO: 0.4 K/UL (ref 0–0.5)
EOSINOPHIL NFR BLD: 6.1 % (ref 0–8)
ERYTHROCYTE [DISTWIDTH] IN BLOOD BY AUTOMATED COUNT: 15.1 % (ref 11.5–14.5)
EST. GFR  (NO RACE VARIABLE): >60 ML/MIN/1.73 M^2
EST. GFR  (NO RACE VARIABLE): >60 ML/MIN/1.73 M^2
GLUCOSE SERPL-MCNC: 93 MG/DL (ref 70–110)
GLUCOSE SERPL-MCNC: 96 MG/DL (ref 70–110)
HCT VFR BLD AUTO: 39.4 % (ref 40–54)
HGB BLD-MCNC: 13 G/DL (ref 14–18)
IMM GRANULOCYTES # BLD AUTO: 0.01 K/UL (ref 0–0.04)
IMM GRANULOCYTES NFR BLD AUTO: 0.1 % (ref 0–0.5)
LYMPHOCYTES # BLD AUTO: 1.6 K/UL (ref 1–4.8)
LYMPHOCYTES NFR BLD: 22.1 % (ref 18–48)
MAGNESIUM SERPL-MCNC: 2 MG/DL (ref 1.6–2.6)
MCH RBC QN AUTO: 31 PG (ref 27–31)
MCHC RBC AUTO-ENTMCNC: 33 G/DL (ref 32–36)
MCV RBC AUTO: 94 FL (ref 82–98)
MONOCYTES # BLD AUTO: 0.6 K/UL (ref 0.3–1)
MONOCYTES NFR BLD: 8.6 % (ref 4–15)
NEUTROPHILS # BLD AUTO: 4.4 K/UL (ref 1.8–7.7)
NEUTROPHILS NFR BLD: 62.2 % (ref 38–73)
NRBC BLD-RTO: 0 /100 WBC
PLATELET # BLD AUTO: 193 K/UL (ref 150–450)
PMV BLD AUTO: 11.6 FL (ref 9.2–12.9)
POTASSIUM SERPL-SCNC: 3.9 MMOL/L (ref 3.5–5.1)
POTASSIUM SERPL-SCNC: 3.9 MMOL/L (ref 3.5–5.1)
PROT SERPL-MCNC: 7.6 G/DL (ref 6–8.4)
RBC # BLD AUTO: 4.19 M/UL (ref 4.6–6.2)
SODIUM SERPL-SCNC: 136 MMOL/L (ref 136–145)
SODIUM SERPL-SCNC: 138 MMOL/L (ref 136–145)
WBC # BLD AUTO: 7.01 K/UL (ref 3.9–12.7)

## 2023-04-19 PROCEDURE — 93005 ELECTROCARDIOGRAM TRACING: CPT | Performed by: SPECIALIST

## 2023-04-19 PROCEDURE — 83735 ASSAY OF MAGNESIUM: CPT | Performed by: INTERNAL MEDICINE

## 2023-04-19 PROCEDURE — 99233 PR SUBSEQUENT HOSPITAL CARE,LEVL III: ICD-10-PCS | Mod: ,,, | Performed by: INTERNAL MEDICINE

## 2023-04-19 PROCEDURE — 25000242 PHARM REV CODE 250 ALT 637 W/ HCPCS: Performed by: INTERNAL MEDICINE

## 2023-04-19 PROCEDURE — 93010 EKG 12-LEAD: ICD-10-PCS | Mod: ,,, | Performed by: SPECIALIST

## 2023-04-19 PROCEDURE — 25000003 PHARM REV CODE 250: Performed by: INTERNAL MEDICINE

## 2023-04-19 PROCEDURE — 63600175 PHARM REV CODE 636 W HCPCS: Performed by: INTERNAL MEDICINE

## 2023-04-19 PROCEDURE — 36415 COLL VENOUS BLD VENIPUNCTURE: CPT | Performed by: INTERNAL MEDICINE

## 2023-04-19 PROCEDURE — 21400001 HC TELEMETRY ROOM

## 2023-04-19 PROCEDURE — 99233 SBSQ HOSP IP/OBS HIGH 50: CPT | Mod: ,,, | Performed by: INTERNAL MEDICINE

## 2023-04-19 PROCEDURE — 80048 BASIC METABOLIC PNL TOTAL CA: CPT | Performed by: INTERNAL MEDICINE

## 2023-04-19 PROCEDURE — 80053 COMPREHEN METABOLIC PANEL: CPT | Performed by: INTERNAL MEDICINE

## 2023-04-19 PROCEDURE — 93010 ELECTROCARDIOGRAM REPORT: CPT | Mod: ,,, | Performed by: SPECIALIST

## 2023-04-19 PROCEDURE — 85025 COMPLETE CBC W/AUTO DIFF WBC: CPT | Performed by: INTERNAL MEDICINE

## 2023-04-19 RX ORDER — LISINOPRIL 5 MG/1
5 TABLET ORAL DAILY
Status: DISCONTINUED | OUTPATIENT
Start: 2023-04-20 | End: 2023-04-19

## 2023-04-19 RX ORDER — FUROSEMIDE 10 MG/ML
40 INJECTION INTRAMUSCULAR; INTRAVENOUS DAILY
Status: DISCONTINUED | OUTPATIENT
Start: 2023-04-19 | End: 2023-04-21

## 2023-04-19 RX ORDER — LANOLIN ALCOHOL/MO/W.PET/CERES
400 CREAM (GRAM) TOPICAL DAILY
Status: DISCONTINUED | OUTPATIENT
Start: 2023-04-19 | End: 2023-04-22 | Stop reason: HOSPADM

## 2023-04-19 RX ORDER — LISINOPRIL 5 MG/1
5 TABLET ORAL DAILY
Status: DISCONTINUED | OUTPATIENT
Start: 2023-04-19 | End: 2023-04-22 | Stop reason: HOSPADM

## 2023-04-19 RX ORDER — METOPROLOL SUCCINATE 25 MG/1
25 TABLET, EXTENDED RELEASE ORAL DAILY
Status: DISCONTINUED | OUTPATIENT
Start: 2023-04-20 | End: 2023-04-22 | Stop reason: HOSPADM

## 2023-04-19 RX ORDER — SERTRALINE HYDROCHLORIDE 50 MG/1
50 TABLET, FILM COATED ORAL NIGHTLY
Status: DISCONTINUED | OUTPATIENT
Start: 2023-04-19 | End: 2023-04-22 | Stop reason: HOSPADM

## 2023-04-19 RX ADMIN — METOPROLOL SUCCINATE 50 MG: 50 TABLET, FILM COATED, EXTENDED RELEASE ORAL at 09:04

## 2023-04-19 RX ADMIN — LORAZEPAM 0.5 MG: 0.5 TABLET ORAL at 12:04

## 2023-04-19 RX ADMIN — ONDANSETRON 4 MG: 2 INJECTION INTRAMUSCULAR; INTRAVENOUS at 09:04

## 2023-04-19 RX ADMIN — POTASSIUM BICARBONATE 20 MEQ: 782 TABLET, EFFERVESCENT ORAL at 10:04

## 2023-04-19 RX ADMIN — BUTALBITAL, ACETAMINOPHEN, AND CAFFEINE 1 TABLET: 50; 325; 40 TABLET, COATED ORAL at 09:04

## 2023-04-19 RX ADMIN — LORAZEPAM 0.5 MG: 0.5 TABLET ORAL at 05:04

## 2023-04-19 RX ADMIN — PANTOPRAZOLE SODIUM 40 MG: 40 TABLET, DELAYED RELEASE ORAL at 05:04

## 2023-04-19 RX ADMIN — FUROSEMIDE 40 MG: 10 INJECTION, SOLUTION INTRAVENOUS at 02:04

## 2023-04-19 RX ADMIN — SPIRONOLACTONE 25 MG: 25 TABLET ORAL at 09:04

## 2023-04-19 RX ADMIN — AMIODARONE HYDROCHLORIDE 150 MG: 1.5 INJECTION, SOLUTION INTRAVENOUS at 12:04

## 2023-04-19 RX ADMIN — ASPIRIN 81 MG 81 MG: 81 TABLET ORAL at 09:04

## 2023-04-19 RX ADMIN — ACETAMINOPHEN 650 MG: 325 TABLET ORAL at 08:04

## 2023-04-19 RX ADMIN — ACETAMINOPHEN 650 MG: 325 TABLET ORAL at 12:04

## 2023-04-19 RX ADMIN — CHLORHEXIDINE GLUCONATE 15 ML: 1.2 RINSE ORAL at 08:04

## 2023-04-19 RX ADMIN — AMIODARONE HYDROCHLORIDE 0.5 MG/MIN: 1.8 INJECTION, SOLUTION INTRAVENOUS at 05:04

## 2023-04-19 RX ADMIN — LORAZEPAM 0.5 MG: 0.5 TABLET ORAL at 08:04

## 2023-04-19 RX ADMIN — AMIODARONE HYDROCHLORIDE 1 MG/MIN: 1.8 INJECTION, SOLUTION INTRAVENOUS at 12:04

## 2023-04-19 RX ADMIN — NITROGLYCERIN 0.4 MG: 0.4 TABLET SUBLINGUAL at 01:04

## 2023-04-19 RX ADMIN — SERTRALINE HYDROCHLORIDE 50 MG: 50 TABLET ORAL at 09:04

## 2023-04-19 RX ADMIN — LISINOPRIL 5 MG: 5 TABLET ORAL at 02:04

## 2023-04-19 RX ADMIN — MUPIROCIN 1 G: 20 OINTMENT TOPICAL at 09:04

## 2023-04-19 RX ADMIN — CHLORHEXIDINE GLUCONATE 15 ML: 1.2 RINSE ORAL at 09:04

## 2023-04-19 RX ADMIN — ACETAMINOPHEN 650 MG: 325 TABLET ORAL at 05:04

## 2023-04-19 RX ADMIN — MUPIROCIN 1 G: 20 OINTMENT TOPICAL at 08:04

## 2023-04-19 RX ADMIN — Medication 400 MG: at 10:04
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
## 2023-04-19 NOTE — PROGRESS NOTES
[facility]  Department of Cardiology  Consult Note      PATIENT NAME: Brandon Rivas  MRN: [id number]  TODAY'S DATE: 04/19/2023  ADMIT DATE: 4/16/2023                          CONSULT REQUESTED BY: Jair Leon MD    SUBJECTIVE     PRINCIPAL PROBLEM: <principal problem not specified>      REASON FOR CONSULT:   Ventricular Tachycardia    INTERVAL HISTORY  04/19/2023    Patient is resting comfortably in bed during examination.  No acute distress.  Chest x-ray shows interstitial pulmonary edema at the lung bases.  Patient has been refusing to take his lisinopril and diuretics today.  However patient is agreeable to starting his lisinopril and IV Lasix this afternoon.  Discussed importance of medication compliance with patient.  Patient had multiple episodes of nonsustained VT overnight.  Patient is started back on amiodarone drip.    4/18/23  Patient is exam during nuclear stress test patient complained of dizziness, chest discomfort and shortness of breath during stress portion of test.  Symptoms resolved quickly.  Per RN patient has been refusing his p.o. medications, Lasix, spironolactone, and lisinopril.  No acute changes noted on EKG.  Patient was sinus rhythm overnight with no events on telemetry.  No further VT.      4/17/23    Patient was discharged yesterday afternoon at approximately 1:00 a.m. later re-presented to the ED with complaints of persistent shortness of breath, palpitations, impending doom and he was found to be in nonsustained VT.  Symptoms lasted approximately 5-10 minutes.  Patient had VT overnight as well.  Patient has refused amiodarone drip this morning but did receive amiodarone at 1 milligram/hour overnight.  Patient states amiodarone gives him dizziness and feelings of shortness of breath.  No acute distress during examination.  Sinus rhythm on the monitor.  98% on room air.  Patient appears anxious.    4/16/23    Patient is resting comfortably in bed during  examination, sinus rhythm on the monitor heart rate 56.  Blood pressure 118/67.  2 L nasal cannula patient was still on amiodarone drip at 0.5 milligrams/hour.  No acute complaints at this time.  Mild dizziness.  No events on telemetry.      HPI:    Patient is a 67-year-old male with CAD s/p stent, hyperlipidemia, hypertension, chronic combined CHF s/p AICD who presented to the ED with chest pain and palpitations.  AICD fired last week with increased palpitations and chest discomfort since.  Heart rate at home was 180.  Patient was found to be in VT versus SVT and was treated with 5 mg of IV metoprolol, 2 g magnesium, and 150 mg amiodarone push.  Amiodarone infusion was initiated.  Patient is anxious during examination.  He was stress about amiodarone as he is concerned it will cause lung problems.  Questionable compliance with home medications.    During examination he was anxious, in sinus rhythm, and on amiodarone drip.  No edema.  No acute distress.            FROM H&P   Chest Pain       Chest pain x 1 day. States heart rate was in 180's.  Took 4 ntg at home.          HPI: 67-year-old man with CAD status post stenting, hyperlipidemia, hypertension, chronic combined CHF status post AICD, tobacco use, presenting with chest pain and palpitations.  Patient reports last week AICD firing.  He has had intermittent palpitations with chest pain since, and they worsened today.  He measured his heart rate at 180 at home.   He presented to the ED where EKG showed V-tach versus SVT.  He was treated with 5 mg IV metoprolol, 2 g magnesium, and 150 mg amiodarone push.  Cardiology was contacted and he was started on amiodarone infusion.  He has gone in and out of wide complex tachycardia while in the ED with stable blood pressure and mentation.    Patient reports he has been very depressed and stressed about his medical problems and this tends to make his  heart rate increase.  He takes amiodarone but only once daily instead of  "twice because he is concerned about lung effects.  He is noncompliant with Lasix because it "tears up his insides".  He reports compliance with his metoprolol.  He continues to smoke cigars a few puffs a day.  He no longer uses alcohol.  He was recently treated for a UTI which he states is resolved and he had a urinalysis last week showing clearance.  He denies current chest pain, fever, chills, cough, dizziness, syncope, shortness of breath, vision changes.         Review of patient's allergies indicates:   Allergen Reactions    Bactrim [sulfamethoxazole-trimethoprim] Shortness Of Breath    Statins-hmg-coa reductase inhibitors Other (See Comments)     Statin intolerance. Muscle weakness    Wellbutrin [bupropion hcl] Other (See Comments)     Abdominal pain        Past Medical History:   Diagnosis Date    Coronary artery disease     Hypertension     MI (myocardial infarction)     Thyroid disease      Past Surgical History:   Procedure Laterality Date    CARDIAC PACEMAKER PLACEMENT       Social History     Tobacco Use    Smoking status: Every Day     Packs/day: 1.00     Years: 40.00     Pack years: 40.00     Types: Cigarettes    Smokeless tobacco: Never   Substance Use Topics    Alcohol use: Yes     Comment: 12 pack beer daily    Drug use: No        REVIEW OF SYSTEMS  As per mentioned in HPI  Denies chest pain, shortness breath, lightheadedness, dizziness, edema    OBJECTIVE     VITAL SIGNS (Most Recent)  Temp: 98.2 °F (36.8 °C) (04/19/23 1550)  Pulse: (!) 59 (04/19/23 1550)  Resp: 20 (04/19/23 1550)  BP: 125/74 (04/19/23 1550)  SpO2: 97 % (04/19/23 1550)    VENTILATION STATUS  Resp: 20 (04/19/23 1550)  SpO2: 97 % (04/19/23 1550)           I & O (Last 24H):  Intake/Output Summary (Last 24 hours) at 4/19/2023 1815  Last data filed at 4/19/2023 1813  Gross per 24 hour   Intake 240 ml   Output 1100 ml   Net -860 ml       WEIGHTS  Wt Readings from Last 3 Encounters:   04/19/23 0411 97.2 kg (214 lb 3.2 oz)   04/18/23 1900 " 96.9 kg (213 lb 10 oz)   04/16/23 1900 99 kg (218 lb 4.1 oz)   04/16/23 1332 95.3 kg (210 lb)   04/15/23 0530 97 kg (213 lb 13.5 oz)   04/15/23 0114 95.3 kg (210 lb)   04/03/23 0954 96.6 kg (213 lb)       PHYSICAL EXAM  GENERAL: well built elderly male in no apparent distress alert and oriented.   HEENT: Normocephalic. Pupils normal and conjunctivae normal.   NECK: No JVD. No bruit..   THYROID: Thyroid not examined  CARDIAC: Regular rate and rhythm. S1 is normal.S2 is normal.  2/6 systolic murmur  CHEST ANATOMY: normal.   LUNGS:  Fine crackles lower lobes  ABDOMEN: Soft Normal bowel sounds. Nontender  URINARY: No farrar catheter   EXTREMITIES: No cyanosis, clubbing or edema noted at this time.  PERIPHERAL VASCULAR SYSTEM: Good palpable distal pulses.   CENTRAL NERVOUS SYSTEM: No focal motor or sensory deficits noted.   SKIN: Skin without lesions, moist, well perfused.   MUSCLE STRENGTH & TONE: No noteable weakness, atrophy or abnormal movement.     HOME MEDICATIONS:  No current facility-administered medications on file prior to encounter.     Current Outpatient Medications on File Prior to Encounter   Medication Sig Dispense Refill    metoprolol succinate (TOPROL-XL) 50 MG 24 hr tablet Take 1 tablet (50 mg total) by mouth once daily. 90 tablet 3    amiodarone (PACERONE) 200 MG Tab Take 1 tablet (200 mg total) by mouth 3 (three) times daily. 90 tablet 11    aspirin 81 MG Chew Take 1 tablet (81 mg total) by mouth once daily.  0    butalbital-acetaminophen-caffeine -40 mg (FIORICET, ESGIC) -40 mg per tablet Take 1 tablet by mouth every 4 (four) hours as needed for Headaches. 20 tablet 0    furosemide (LASIX) 40 MG tablet Take 1 tablet (40 mg total) by mouth once daily. 90 tablet 3    lisinopriL (PRINIVIL,ZESTRIL) 20 MG tablet Take 1 tablet (20 mg total) by mouth once daily. 90 tablet 3    LORazepam (ATIVAN) 0.5 MG tablet Take 1 tablet (0.5 mg total) by mouth every 8 (eight) hours as needed for Anxiety. 30  tablet 0    nitroGLYCERIN (NITROSTAT) 0.4 MG SL tablet Place 1 tablet (0.4 mg total) under the tongue every 5 (five) minutes as needed for Chest pain. 25 tablet 6    pantoprazole (PROTONIX) 40 MG tablet Take 40 mg by mouth once daily.      potassium chloride (KLOR-CON) 10 MEQ TbSR Take 1 tablet (10 mEq total) by mouth every other day. 45 tablet 3    spironolactone (ALDACTONE) 25 MG tablet Take 1 tablet (25 mg total) by mouth once daily. 90 tablet 3    tamsulosin (FLOMAX) 0.4 mg Cap Take 1 capsule by mouth once daily.         SCHEDULED MEDS:   aspirin  81 mg Oral Daily    chlorhexidine  15 mL Mouth/Throat BID    enoxaparin  40 mg Subcutaneous Daily    furosemide (LASIX) injection  40 mg Intravenous Daily    lisinopriL  5 mg Oral Daily    magnesium oxide  400 mg Oral Daily    [START ON 4/20/2023] metoprolol succinate  25 mg Oral Daily    mupirocin   Nasal BID    pantoprazole  40 mg Oral Before breakfast    potassium chloride  10 mEq Oral Every other day    spironolactone  25 mg Oral Daily       CONTINUOUS INFUSIONS:   amiodarone in dextrose 5% 0.5 mg/min (04/19/23 8514)         PRN MEDS:acetaminophen, butalbital-acetaminophen-caffeine -40 mg, LORazepam, magnesium oxide, magnesium oxide, melatonin, nitroGLYCERIN, ondansetron, polyethylene glycol, potassium bicarbonate, potassium bicarbonate, potassium bicarbonate, sodium chloride 0.9%    LABS AND DIAGNOSTICS     CBC LAST 3 DAYS  Recent Labs   Lab 04/17/23  0449 04/18/23  0526 04/19/23  0424   WBC 5.95 7.42 7.01   RBC 4.15* 4.28* 4.19*   HGB 12.8* 12.9* 13.0*   HCT 38.5* 40.4 39.4*   MCV 93 94 94   MCH 30.8 30.1 31.0   MCHC 33.2 31.9* 33.0   RDW 15.2* 15.4* 15.1*    202 193   MPV 11.0 11.1 11.6   GRAN 59.5  3.5 60.3  4.5 62.2  4.4   LYMPH 26.2  1.6 25.1  1.9 22.1  1.6   MONO 8.7  0.5 9.0  0.7 8.6  0.6   BASO 0.06 0.06 0.06   NRBC 0 0 0       COAGULATION LAST 3 DAYS  Recent Labs   Lab 04/15/23  0135 04/18/23  0527   INR 1.2 1.0   APTT  --  30.2        CHEMISTRY LAST 3 DAYS  Recent Labs   Lab 04/17/23  0449 04/18/23  0526 04/19/23  0423    137 136   K 3.8 4.1 3.9    106 109   CO2 22* 25 22*   ANIONGAP 6* 6* 5*   BUN 13 15 13   CREATININE 0.7 0.7 0.7   GLU 95 100 93   CALCIUM 8.6* 9.2 8.9   MG 2.2 2.1 2.0   ALBUMIN 3.8 4.0 4.0   PROT 7.3 7.8 7.6   ALKPHOS 76 74 73   ALT 23 22 20   AST 23 18 19   BILITOT 0.6 0.5 0.5       CARDIAC PROFILE LAST 3 DAYS  Recent Labs   Lab 04/15/23  0135 04/15/23  0518 04/15/23  0902 04/16/23  1450 04/16/23  1631   *  --   --  433*  --    TROPONINIHS 16.6*   < > 14.5 11.3 11.6    < > = values in this interval not displayed.       ENDOCRINE LAST 3 DAYS  Recent Labs   Lab 04/16/23  1450   TSH 8.440*       LAST ARTERIAL BLOOD GAS  ABG  No results for input(s): PH, PO2, PCO2, HCO3, BE in the last 168 hours.    LAST 7 DAYS MICROBIOLOGY   Microbiology Results (last 7 days)       ** No results found for the last 168 hours. **            MOST RECENT IMAGING  X-Ray Chest AP Portable  CLINICAL HISTORY:  67 years (1955) Male Ventricular tachycardia Ventricular tachycardia    TECHNIQUE:  Portable AP radiograph the chest.    COMPARISON:  Most recent radiograph from April 16, 2023    FINDINGS:  There is vascular congestion with increased interstitial markings findings indicating mild pulmonary edema.  There is blunting of both costophrenic angles consistent with trace pleural effusions and adjacent atelectasis. No pneumothorax is identified. The heart is mildly enlarged. The median sternotomy wires and the left sided AICD pacemaker are unchanged. Atheromatous calcifications are seen at the aortic arch. Osseous structures appear unchanged. The visualized upper abdomen is unremarkable.    IMPRESSION:  Cardiomegaly and findings of mild interstitial pulmonary edema at the lung bases.    .    Electronically signed by:  Moe Huang MD  4/19/2023 11:10 AM CDT Workstation: [id number]      ECHOCARDIOGRAM RESULTS (last  5)  Results for orders placed during the hospital encounter of 02/21/23    Echo    Interpretation Summary  · The left ventricle is mildly enlarged with moderate concentric hypertrophy evidence of old apical scar seen on apical four-chamber view  · The estimated ejection fraction is 34%.  · Grade III left ventricular diastolic dysfunction. Mean left atrial pressure 11  · There are segmental left ventricular wall motion abnormalities.  · Atrial fibrillation not observed.  · Normal right ventricular size with mildly reduced right ventricular systolic function.  · Mild-to-moderate aortic regurgitation.  · Severe mitral regurgitation.  · Intermediate central venous pressure (8 mmHg).  · The estimated PA systolic pressure is 30 mmHg. Mild pulmonary hypertension is present    Patient has sinus rhythm      CURRENT/PREVIOUS VISIT EKG  Results for orders placed or performed during the hospital encounter of 04/16/23   EKG 12-lead    Collection Time: 04/19/23 12:18 AM    Narrative    Test Reason : R07.9,    Vent. Rate : 074 BPM     Atrial Rate : 074 BPM     P-R Int : 188 ms          QRS Dur : 110 ms      QT Int : 424 ms       P-R-T Axes : 075 -02 107 degrees     QTc Int : 470 ms    Sinus rhythm with Premature atrial complexes  Septal infarct (cited on or before 18-DEC-2016)  Abnormal ECG  When compared with ECG of 16-APR-2023 13:34,  Previous ECG has undetermined rhythm, needs review  Non-specific change in ST segment in Lateral leads  Nonspecific T wave abnormality has replaced inverted T waves in Lateral  leads    Referred By: [email or web address]   SELF           Confirmed By:            ASSESSMENT/PLAN:     There are no active hospital problems to display for this patient.      ASSESSMENT & PLAN:     Ventricular tachycardia s/p ICD discharge  HFrEF s/p ICD  CAD s/p CABG  H/o anterior MI 2001, LIMA to LAD 7/2004(reportedly 2 vessel bypass, need OR report to confirm), no dz in Lcx and RCA at that time  Severe MR, re-eval once on  GDMT      RECOMMENDATIONS:    Multiple nonsustained episodes of ventricular tachycardia overnight.  Patient is on amiodarone drip.  We will attempt to transition to patient to p.o. amiodarone tomorrow if no VT overnight.  Nuclear stress test negative for reversible ischemia.   IV Lasix 40 mg given today.  Chest x-ray shows interstitial pulmonary edema at the bases.  Strict I&Os.  1.5 L fluid restriction.  2 g low-sodium heart healthy diet.    Patient is continuing to refuse medications.  Encouraged patient to take medications as prescribed. Recommend compliance with medication regimen.   Continue aspirin 81 mg daily. Continue lisinopril 5 mg daily.  Continue metoprolol 25mg daily. Continue spironolactone 25 mg daily.  Hold antihypertensives for SBP <100.   Recent ECHO shows severe MR, moderate AR, EF 34% with grade III diastolic dysfunction. Strict I's and O's. 1.5L fluid restriction. 2g low sodium diet.   Continue to check and replace potassium and magnesium. Goal for potassium is 4.0, and goal for magnesium is 2.0.    We will continue to follow.      Shirlene Oconnell NP  Department of Cardiology  Date of Service: 04/19/2023

## 2023-04-19 NOTE — NURSING
Report given to CHANTEL Almanza. Transferred to room 2519 with tele monitor and personal belongings.  No distress noted at time of transfer.

## 2023-04-19 NOTE — PROGRESS NOTES
I was notified by RN that patient is having runs of NSVT and sustained vtach that is asymptomatic.  Per chart review, previously thought to be SVT versus Vtach.  I have ordered an amiodarone gtt if he is agreeable and am checking K and Mg.  Both were at goal on AM labs.  Back in a NSR at this time.
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
## 2023-04-20 LAB
ALBUMIN SERPL BCP-MCNC: 3.8 G/DL (ref 3.5–5.2)
ALP SERPL-CCNC: 69 U/L (ref 55–135)
ALT SERPL W/O P-5'-P-CCNC: 20 U/L (ref 10–44)
ANION GAP SERPL CALC-SCNC: 10 MMOL/L (ref 8–16)
AST SERPL-CCNC: 19 U/L (ref 10–40)
BASOPHILS # BLD AUTO: 0.05 K/UL (ref 0–0.2)
BASOPHILS NFR BLD: 0.8 % (ref 0–1.9)
BILIRUB SERPL-MCNC: 0.6 MG/DL (ref 0.1–1)
BUN SERPL-MCNC: 14 MG/DL (ref 8–23)
CALCIUM SERPL-MCNC: 9.2 MG/DL (ref 8.7–10.5)
CHLORIDE SERPL-SCNC: 105 MMOL/L (ref 95–110)
CO2 SERPL-SCNC: 24 MMOL/L (ref 23–29)
CREAT SERPL-MCNC: 0.7 MG/DL (ref 0.5–1.4)
DIFFERENTIAL METHOD: ABNORMAL
EOSINOPHIL # BLD AUTO: 0.4 K/UL (ref 0–0.5)
EOSINOPHIL NFR BLD: 5.5 % (ref 0–8)
ERYTHROCYTE [DISTWIDTH] IN BLOOD BY AUTOMATED COUNT: 15.1 % (ref 11.5–14.5)
EST. GFR  (NO RACE VARIABLE): >60 ML/MIN/1.73 M^2
GLUCOSE SERPL-MCNC: 92 MG/DL (ref 70–110)
HCT VFR BLD AUTO: 40.1 % (ref 40–54)
HGB BLD-MCNC: 12.9 G/DL (ref 14–18)
IMM GRANULOCYTES # BLD AUTO: 0.02 K/UL (ref 0–0.04)
IMM GRANULOCYTES NFR BLD AUTO: 0.3 % (ref 0–0.5)
LYMPHOCYTES # BLD AUTO: 1.2 K/UL (ref 1–4.8)
LYMPHOCYTES NFR BLD: 17.7 % (ref 18–48)
MAGNESIUM SERPL-MCNC: 1.9 MG/DL (ref 1.6–2.6)
MCH RBC QN AUTO: 30.6 PG (ref 27–31)
MCHC RBC AUTO-ENTMCNC: 32.2 G/DL (ref 32–36)
MCV RBC AUTO: 95 FL (ref 82–98)
MONOCYTES # BLD AUTO: 0.7 K/UL (ref 0.3–1)
MONOCYTES NFR BLD: 10.1 % (ref 4–15)
NEUTROPHILS # BLD AUTO: 4.3 K/UL (ref 1.8–7.7)
NEUTROPHILS NFR BLD: 65.6 % (ref 38–73)
NRBC BLD-RTO: 0 /100 WBC
PLATELET # BLD AUTO: 169 K/UL (ref 150–450)
PMV BLD AUTO: 11 FL (ref 9.2–12.9)
POTASSIUM SERPL-SCNC: 4.1 MMOL/L (ref 3.5–5.1)
PROT SERPL-MCNC: 7.4 G/DL (ref 6–8.4)
RBC # BLD AUTO: 4.21 M/UL (ref 4.6–6.2)
SODIUM SERPL-SCNC: 139 MMOL/L (ref 136–145)
WBC # BLD AUTO: 6.56 K/UL (ref 3.9–12.7)

## 2023-04-20 PROCEDURE — 99233 SBSQ HOSP IP/OBS HIGH 50: CPT | Mod: ,,, | Performed by: INTERNAL MEDICINE

## 2023-04-20 PROCEDURE — 94761 N-INVAS EAR/PLS OXIMETRY MLT: CPT

## 2023-04-20 PROCEDURE — 25000003 PHARM REV CODE 250: Performed by: INTERNAL MEDICINE

## 2023-04-20 PROCEDURE — 36415 COLL VENOUS BLD VENIPUNCTURE: CPT | Performed by: INTERNAL MEDICINE

## 2023-04-20 PROCEDURE — 99900031 HC PATIENT EDUCATION (STAT)

## 2023-04-20 PROCEDURE — 85025 COMPLETE CBC W/AUTO DIFF WBC: CPT | Performed by: INTERNAL MEDICINE

## 2023-04-20 PROCEDURE — 83735 ASSAY OF MAGNESIUM: CPT | Performed by: INTERNAL MEDICINE

## 2023-04-20 PROCEDURE — 63600175 PHARM REV CODE 636 W HCPCS: Performed by: INTERNAL MEDICINE

## 2023-04-20 PROCEDURE — 99900035 HC TECH TIME PER 15 MIN (STAT)

## 2023-04-20 PROCEDURE — 21400001 HC TELEMETRY ROOM

## 2023-04-20 PROCEDURE — 99233 PR SUBSEQUENT HOSPITAL CARE,LEVL III: ICD-10-PCS | Mod: ,,, | Performed by: INTERNAL MEDICINE

## 2023-04-20 PROCEDURE — 80053 COMPREHEN METABOLIC PANEL: CPT | Performed by: INTERNAL MEDICINE

## 2023-04-20 PROCEDURE — 25000003 PHARM REV CODE 250

## 2023-04-20 RX ORDER — AMIODARONE HYDROCHLORIDE 200 MG/1
200 TABLET ORAL 3 TIMES DAILY
Status: DISCONTINUED | OUTPATIENT
Start: 2023-04-20 | End: 2023-04-22

## 2023-04-20 RX ADMIN — POTASSIUM CHLORIDE 10 MEQ: 750 CAPSULE, EXTENDED RELEASE ORAL at 09:04

## 2023-04-20 RX ADMIN — LORAZEPAM 0.5 MG: 0.5 TABLET ORAL at 03:04

## 2023-04-20 RX ADMIN — FUROSEMIDE 40 MG: 10 INJECTION, SOLUTION INTRAVENOUS at 09:04

## 2023-04-20 RX ADMIN — SPIRONOLACTONE 25 MG: 25 TABLET ORAL at 09:04

## 2023-04-20 RX ADMIN — MUPIROCIN 1 G: 20 OINTMENT TOPICAL at 09:04

## 2023-04-20 RX ADMIN — AMIODARONE HYDROCHLORIDE 200 MG: 200 TABLET ORAL at 03:04

## 2023-04-20 RX ADMIN — ACETAMINOPHEN 650 MG: 325 TABLET ORAL at 09:04

## 2023-04-20 RX ADMIN — METOPROLOL SUCCINATE 25 MG: 25 TABLET, EXTENDED RELEASE ORAL at 09:04

## 2023-04-20 RX ADMIN — LORAZEPAM 0.5 MG: 0.5 TABLET ORAL at 09:04

## 2023-04-20 RX ADMIN — SERTRALINE HYDROCHLORIDE 50 MG: 50 TABLET ORAL at 09:04

## 2023-04-20 RX ADMIN — MELATONIN 9 MG: at 09:04

## 2023-04-20 RX ADMIN — BUTALBITAL, ACETAMINOPHEN, AND CAFFEINE 1 TABLET: 50; 325; 40 TABLET, COATED ORAL at 09:04

## 2023-04-20 RX ADMIN — ASPIRIN 81 MG 81 MG: 81 TABLET ORAL at 09:04

## 2023-04-20 RX ADMIN — PANTOPRAZOLE SODIUM 40 MG: 40 TABLET, DELAYED RELEASE ORAL at 06:04

## 2023-04-20 RX ADMIN — Medication 400 MG: at 09:04

## 2023-04-20 RX ADMIN — ACETAMINOPHEN 650 MG: 325 TABLET ORAL at 03:04

## 2023-04-20 RX ADMIN — AMIODARONE HYDROCHLORIDE 200 MG: 200 TABLET ORAL at 09:04

## 2023-04-20 RX ADMIN — LORAZEPAM 0.5 MG: 0.5 TABLET ORAL at 06:04

## 2023-04-20 RX ADMIN — CHLORHEXIDINE GLUCONATE 15 ML: 1.2 RINSE ORAL at 09:04

## 2023-04-20 RX ADMIN — ACETAMINOPHEN 650 MG: 325 TABLET ORAL at 06:04

## 2023-04-20 NOTE — PROGRESS NOTES
"Critical access hospital Medicine  Progress Note    Patient Name: Brandon Rivas  MRN: [id number]  Patient Class: IP- Inpatient  Admission Date: 4/16/2023  Attending Physician: Jair Leon MD  Primary Care Provider: James Garcia MD  DOS: 04/20/2023    Subjective:     Chief Complaint:   Chief Complaint   Patient presents with    Shortness of Breath     Pt is complaining of feeling like his heart was racing and he is SOB. Started 15 minutes ago        HPI:   67-year-old man with CAD status post stenting, hyperlipidemia, hypertension, chronic combined CHF status post AICD, tobacco use, presenting with chest pain and palpitations.  Patient reports last week AICD firing.  He has had intermittent palpitations with chest pain since, and they worsened today.  He measured his heart rate at 180 at home.   He presented to the ED where EKG showed V-tach versus SVT.  He was treated with 5 mg IV metoprolol, 2 g magnesium, and 150 mg amiodarone push.  Cardiology was contacted and he was started on amiodarone infusion.  He has gone in and out of wide complex tachycardia while in the ED with stable blood pressure and mentation.    Patient reports he has been very depressed and stressed about his medical problems and this tends to make his  heart rate increase.  He takes amiodarone but only once daily instead of twice because he is concerned about lung effects.  He is noncompliant with Lasix because it "tears up his insides".  He reports compliance with his metoprolol.  He continues to smoke cigars a few puffs a day.  He no longer uses alcohol.  He was recently treated for a UTI which he states is resolved and he had a urinalysis last week showing clearance.  He denies current chest pain, fever, chills, cough, dizziness, syncope, shortness of breath, vision changes.    The patient was given IV amiodarone infusion for ventricular tachycardia which improved without recurrence.  The patient had " interrogation of AICD which did not reveal any arrhythmias since last interrogation.  The patient's symptoms improved and clinically the patient improved.  The patient was transitioned to oral amiodarone although the patient was very resistant to taking amiodarone 3 times daily.  He agreed to take amiodarone and subsequently was discharged home.  During hospitalization he was intermittently refusing medications.  Psychiatry consultation was ordered but patient was discharged prior to being seen by Psychiatry.  After about 1 hour the patient return to the emergency room with palpitations.  He was found to have ventricular tachycardia.  Cardiology consultation was obtained.  He was started on IV amiodarone infusion and re- admitted to the hospital.    4/17:  Patient seen and examined.  No chest pain.  No shortness of breath.  Afebrile.  Blood pressure stable.  The patient has been intermittently refusing IV amiodarone infusion today.  Cardiology to relatively planning angiogram tomorrow.    Interval history:  4/18: Patient seen and examined.  Patient reports persistent intermittent palpitations.  No chest pain or shortness of breath.  He has persistent generalized weakness.  He has been tolerating IV amiodarone infusion today without significant adverse effect.  Patient tolerated stress test well today.  He is very anxious about recurrent episodes of V-tach.  He knows that his anxiety is driving his symptoms.  He met with Psychiatry but is resistant to starting SSRI.    4/19: Patient seen and examined.  Patient reports palpitations better today with medical treatment, currently resolved.  No chest pain or shortness of breath.  Generalized weakness minimal.  Patient placed back on IV amiodarone overnight due to ventricular tachycardia episode.  Patient discussed plan with Cardiology today and agreeable to comply with medications including Lasix and amiodarone.  Patient met with Psychiatry and is now agreeable to  starting SSRI was Zoloft.  Tolerating diet without nausea or vomiting.  No headache or syncope.    4/20: Patient seen and examined. On cell phone talking comfortably.  No acute issues    Review of Systems:  3 point review of systems reviewed and negative except as per interval history above    Vitals:    04/20/23 0753 04/20/23 1055 04/20/23 1107 04/20/23 1130   BP: 126/81   116/65   BP Location: Right arm   Right arm   Patient Position: Lying   Lying   Pulse: (!) 52 (!) 55  (!) 53   Resp: 18 18  18   Temp: 97.2 °F (36.2 °C)   97.8 °F (36.6 °C)   TempSrc: Oral   Oral   SpO2: 95% 95%  97%   Weight:   93.4 kg (205 lb 14.6 oz)    Height:             Physical exam   GENERAL:  Nontoxic nondiaphoretic, appears more comfortable today  HEENT:  Moist mucous membranes, poor dentition  EYES:  No conjunctival discharge or scleral icterus  LUNGS:  Comfortable work of breathing, lungs are clear bilaterally  CARDIAC:  2+ radial pulses, regular rate and rhythm, AICD left chest  ABDOMEN:  Positive bowel sounds.  Soft, Nontender and nondistended.   SKIN:  Dry and warm with no jaundice  NEUROLOGIC:  Awake, alert and oriented, fluent speech, follows commands appropriately  PSYCH:  Mood is anxious, insight fair, affect normal    LABS:  Labs reviewed    Chest x-ray 4/19:  Interstitial pulmonary edema    Nuclear stress testing negative for reversible ischemia    Assessment/Plan:     Recurrent V-tach  Still having intermittent episodes.  Admit to ICU.  Continue  Amiodarone drip, Cardiology consult  Possibly triggered by medication noncompliance and stress      Moderate episode of recurrent major depressive disorder  Discussed initiating a low-dose SSRI with him at length.  He is very hesitant about medications, will consider it  Continue his anxiety medication as needed in the meantime      Chronic combined systolic and diastolic heart failure  Grade 3 diastolic dysfunction with EF 34% on last echo.  He has pulmonary edema on exam but  refuses any Lasix.      PVD (peripheral vascular disease)        Tobacco dependence  Smokes a few puffs of cigar daily.  Refuses nicotine patch    Coronary artery disease        Hypertension  Blood pressure elevated.  Resume home oral medications and monitor        Plan update today:  Continue care on telemetry.  Appreciate consultants-cardiology & psychiatry   Continue IV amiodarone infusion.  Transition to oral amiodarone.  Patient agreeable to compliance.  Nuclear stress testing performed and negative. AICD interrogated.  Will need EP referral at discharge  Continue diuretics Lasix and Aldactone.  Monitor urine output and fluid status.  Patient agreeable to compliance.  Continue medical management including aspirin and ACE inhibitor, titrate regimen as needed  Appreciate input from Psychiatry.  Continue Zoloft.  Continue PRN Ativan.  Continue supportive care measures   Continue home medications for chronic issues as able   Mobilize as able   Advance diet as tolerated   Serial labs, replace electrolytes as needed  GI prophylaxis with PPI  VTE prophylaxis with Lovenox  High risk secondary to severe exacerbation of chronic illness; acute illness with risk to life from ventricular tachycardia the setting of systolic CHF  Possible discharge next 24-48 hours if remains stable      Jair Leon MD  Department of Hospital Medicine  [facility]

## 2023-04-20 NOTE — PROGRESS NOTES
"[facility] Medicine  Progress Note    Patient Name: Brandon Rivas  MRN: [id number]  Patient Class: IP- Inpatient  Admission Date: 4/16/2023  Attending Physician: Jair Leon MD  Primary Care Provider: James Garcia MD  DOS: 04/19/2023    Subjective:     Chief Complaint:   Chief Complaint   Patient presents with    Shortness of Breath     Pt is complaining of feeling like his heart was racing and he is SOB. Started 15 minutes ago        HPI:   67-year-old man with CAD status post stenting, hyperlipidemia, hypertension, chronic combined CHF status post AICD, tobacco use, presenting with chest pain and palpitations.  Patient reports last week AICD firing.  He has had intermittent palpitations with chest pain since, and they worsened today.  He measured his heart rate at 180 at home.   He presented to the ED where EKG showed V-tach versus SVT.  He was treated with 5 mg IV metoprolol, 2 g magnesium, and 150 mg amiodarone push.  Cardiology was contacted and he was started on amiodarone infusion.  He has gone in and out of wide complex tachycardia while in the ED with stable blood pressure and mentation.    Patient reports he has been very depressed and stressed about his medical problems and this tends to make his  heart rate increase.  He takes amiodarone but only once daily instead of twice because he is concerned about lung effects.  He is noncompliant with Lasix because it "tears up his insides".  He reports compliance with his metoprolol.  He continues to smoke cigars a few puffs a day.  He no longer uses alcohol.  He was recently treated for a UTI which he states is resolved and he had a urinalysis last week showing clearance.  He denies current chest pain, fever, chills, cough, dizziness, syncope, shortness of breath, vision changes.    The patient was given IV amiodarone infusion for ventricular tachycardia which improved without recurrence.  The patient had " interrogation of AICD which did not reveal any arrhythmias since last interrogation.  The patient's symptoms improved and clinically the patient improved.  The patient was transitioned to oral amiodarone although the patient was very resistant to taking amiodarone 3 times daily.  He agreed to take amiodarone and subsequently was discharged home.  During hospitalization he was intermittently refusing medications.  Psychiatry consultation was ordered but patient was discharged prior to being seen by Psychiatry.  After about 1 hour the patient return to the emergency room with palpitations.  He was found to have ventricular tachycardia.  Cardiology consultation was obtained.  He was started on IV amiodarone infusion and re- admitted to the hospital.    4/17:  Patient seen and examined.  No chest pain.  No shortness of breath.  Afebrile.  Blood pressure stable.  The patient has been intermittently refusing IV amiodarone infusion today.  Cardiology to relatively planning angiogram tomorrow.    Interval history:  4/18: Patient seen and examined.  Patient reports persistent intermittent palpitations.  No chest pain or shortness of breath.  He has persistent generalized weakness.  He has been tolerating IV amiodarone infusion today without significant adverse effect.  Patient tolerated stress test well today.  He is very anxious about recurrent episodes of V-tach.  He knows that his anxiety is driving his symptoms.  He met with Psychiatry but is resistant to starting SSRI.    4/19: Patient seen and examined.  Patient reports palpitations better today with medical treatment, currently resolved.  No chest pain or shortness of breath.  Generalized weakness minimal.  Patient placed back on IV amiodarone overnight due to ventricular tachycardia episode.  Patient discussed plan with Cardiology today and agreeable to comply with medications including Lasix and amiodarone.  Patient met with Psychiatry and is now agreeable to  starting SSRI was Zoloft.  Tolerating diet without nausea or vomiting.  No headache or syncope.    Review of Systems:  3 point review of systems reviewed and negative except as per interval history above    Vitals:    04/19/23 0742 04/19/23 0942 04/19/23 1152 04/19/23 1550   BP: 126/76  129/83 125/74   BP Location: Right arm  Right arm Left arm   Patient Position: Lying  Sitting Sitting   Pulse: (!) 54 61 60 (!) 59   Resp: 18  20 20   Temp: 97.7 °F (36.5 °C)  97.3 °F (36.3 °C) 98.2 °F (36.8 °C)   TempSrc: Oral  Oral Oral   SpO2: 97%  97% 97%   Weight:       Height:             Physical exam   GENERAL:  Nontoxic nondiaphoretic, appears more comfortable today  HEENT:  Moist mucous membranes, poor dentition  EYES:  No conjunctival discharge or scleral icterus  LUNGS:  Comfortable work of breathing, lungs are clear bilaterally  CARDIAC:  2+ radial pulses, regular rate and rhythm, AICD left chest  ABDOMEN:  Positive bowel sounds.  Soft, Nontender and nondistended.   SKIN:  Dry and warm with no jaundice  NEUROLOGIC:  Awake, alert and oriented, fluent speech, follows commands appropriately  PSYCH:  Mood is anxious, insight fair, affect normal    LABS:  Creatinine 0.7   Potassium 3.9   Hemoglobin 11    Chest x-ray 4/19:  Interstitial pulmonary edema    Nuclear stress testing negative for reversible ischemia    Assessment/Plan:     Recurrent V-tach  Still having intermittent episodes.  Admit to ICU.  Continue  Amiodarone drip, Cardiology consult  Possibly triggered by medication noncompliance and stress      Moderate episode of recurrent major depressive disorder  Discussed initiating a low-dose SSRI with him at length.  He is very hesitant about medications, will consider it  Continue his anxiety medication as needed in the meantime      Chronic combined systolic and diastolic heart failure  Grade 3 diastolic dysfunction with EF 34% on last echo.  He has pulmonary edema on exam but refuses any Lasix.      PVD (peripheral  vascular disease)        Tobacco dependence  Smokes a few puffs of cigar daily.  Refuses nicotine patch    Coronary artery disease        Hypertension  Blood pressure elevated.  Resume home oral medications and monitor        Plan update today:  Continue care on telemetry.  Appreciate consultants-cardiology & psychiatry   Continue IV amiodarone infusion.  Transition to oral amiodarone as able.   Nuclear stress testing performed and negative. AICD interrogated.  Will need EP referral at discharge  Continue diuretics Lasix and Aldactone.  Monitor urine output and fluid status.    Continue medical management including aspirin and ACE inhibitor, titrate regimen as needed  Appreciate input from Psychiatry.  Start Zoloft today.  Continue PRN Ativan.  Continue supportive care measures   Continue home medications for chronic issues as able   Mobilize as able   Advance diet as tolerated   Serial labs, replace electrolytes as needed  GI prophylaxis with PPI  VTE prophylaxis with Lovenox  High risk secondary to severe exacerbation of chronic illness; acute illness with risk to life from ventricular tachycardia the setting of systolic CHF  Possible discharge next 24-48 hours if remains stable      Jair Leon MD  Department of Hospital Medicine  [facility] hospital

## 2023-04-20 NOTE — PROGRESS NOTES
[facility]  Department of Cardiology  Consult Note      PATIENT NAME: Brandon Rivas  MRN: [id number]  TODAY'S DATE: 04/20/2023  ADMIT DATE: 4/16/2023                          CONSULT REQUESTED BY: Jair Leon MD    SUBJECTIVE     PRINCIPAL PROBLEM: <principal problem not specified>      REASON FOR CONSULT:   Ventricular Tachycardia    INTERVAL HISTORY    4/20/23  Patient is resting comfortably.  NAD.  VSS. A&O x 4.  Hemodynamically stable.  No VT overnight.  Patient was transitioned to oral amiodarone today and taken off of amiodarone drip.-1.6 L yesterday and-1.1 L today.  Patient states his breathing is improving.  He denies shortness of breath and dizziness.      04/19/2023    Patient is resting comfortably in bed during examination.  No acute distress.  Chest x-ray shows interstitial pulmonary edema at the lung bases.  Patient has been refusing to take his lisinopril and diuretics today.  However patient is agreeable to starting his lisinopril and IV Lasix this afternoon.  Discussed importance of medication compliance with patient.  Patient had multiple episodes of nonsustained VT overnight.  Patient is started back on amiodarone drip.    4/18/23  Patient is exam during nuclear stress test patient complained of dizziness, chest discomfort and shortness of breath during stress portion of test.  Symptoms resolved quickly.  Per RN patient has been refusing his p.o. medications, Lasix, spironolactone, and lisinopril.  No acute changes noted on EKG.  Patient was sinus rhythm overnight with no events on telemetry.  No further VT.      4/17/23    Patient was discharged yesterday afternoon at approximately 1:00 a.m. later re-presented to the ED with complaints of persistent shortness of breath, palpitations, impending doom and he was found to be in nonsustained VT.  Symptoms lasted approximately 5-10 minutes.  Patient had VT overnight as well.  Patient has refused amiodarone drip this morning  but did receive amiodarone at 1 milligram/hour overnight.  Patient states amiodarone gives him dizziness and feelings of shortness of breath.  No acute distress during examination.  Sinus rhythm on the monitor.  98% on room air.  Patient appears anxious.    4/16/23    Patient is resting comfortably in bed during examination, sinus rhythm on the monitor heart rate 56.  Blood pressure 118/67.  2 L nasal cannula patient was still on amiodarone drip at 0.5 milligrams/hour.  No acute complaints at this time.  Mild dizziness.  No events on telemetry.      HPI:    Patient is a 67-year-old male with CAD s/p stent, hyperlipidemia, hypertension, chronic combined CHF s/p AICD who presented to the ED with chest pain and palpitations.  AICD fired last week with increased palpitations and chest discomfort since.  Heart rate at home was 180.  Patient was found to be in VT versus SVT and was treated with 5 mg of IV metoprolol, 2 g magnesium, and 150 mg amiodarone push.  Amiodarone infusion was initiated.  Patient is anxious during examination.  He was stress about amiodarone as he is concerned it will cause lung problems.  Questionable compliance with home medications.    During examination he was anxious, in sinus rhythm, and on amiodarone drip.  No edema.  No acute distress.            FROM H&P   Chest Pain       Chest pain x 1 day. States heart rate was in 180's.  Took 4 ntg at home.          HPI: 67-year-old man with CAD status post stenting, hyperlipidemia, hypertension, chronic combined CHF status post AICD, tobacco use, presenting with chest pain and palpitations.  Patient reports last week AICD firing.  He has had intermittent palpitations with chest pain since, and they worsened today.  He measured his heart rate at 180 at home.   He presented to the ED where EKG showed V-tach versus SVT.  He was treated with 5 mg IV metoprolol, 2 g magnesium, and 150 mg amiodarone push.  Cardiology was contacted and he was started on  "amiodarone infusion.  He has gone in and out of wide complex tachycardia while in the ED with stable blood pressure and mentation.    Patient reports he has been very depressed and stressed about his medical problems and this tends to make his  heart rate increase.  He takes amiodarone but only once daily instead of twice because he is concerned about lung effects.  He is noncompliant with Lasix because it "tears up his insides".  He reports compliance with his metoprolol.  He continues to smoke cigars a few puffs a day.  He no longer uses alcohol.  He was recently treated for a UTI which he states is resolved and he had a urinalysis last week showing clearance.  He denies current chest pain, fever, chills, cough, dizziness, syncope, shortness of breath, vision changes.         Review of patient's allergies indicates:   Allergen Reactions    Bactrim [sulfamethoxazole-trimethoprim] Shortness Of Breath    Statins-hmg-coa reductase inhibitors Other (See Comments)     Statin intolerance. Muscle weakness    Wellbutrin [bupropion hcl] Other (See Comments)     Abdominal pain        Past Medical History:   Diagnosis Date    Coronary artery disease     Hypertension     MI (myocardial infarction)     Thyroid disease      Past Surgical History:   Procedure Laterality Date    CARDIAC PACEMAKER PLACEMENT       Social History     Tobacco Use    Smoking status: Every Day     Packs/day: 1.00     Years: 40.00     Pack years: 40.00     Types: Cigarettes    Smokeless tobacco: Never   Substance Use Topics    Alcohol use: Yes     Comment: 12 pack beer daily    Drug use: No        REVIEW OF SYSTEMS  As per mentioned in HPI  Denies chest pain, shortness breath, lightheadedness, dizziness, edema    OBJECTIVE     VITAL SIGNS (Most Recent)  Temp: 98.1 °F (36.7 °C) (04/20/23 1557)  Pulse: (!) 54 (04/20/23 1557)  Resp: 18 (04/20/23 1557)  BP: 122/74 (04/20/23 1557)  SpO2: 95 % (04/20/23 1557)    VENTILATION STATUS  Resp: 18 (04/20/23 " 1557)  SpO2: 95 % (04/20/23 1557)           I & O (Last 24H):  Intake/Output Summary (Last 24 hours) at 4/20/2023 1636  Last data filed at 4/20/2023 1105  Gross per 24 hour   Intake 120 ml   Output 2975 ml   Net -2855 ml       WEIGHTS  Wt Readings from Last 3 Encounters:   04/20/23 1107 93.4 kg (205 lb 14.6 oz)   04/20/23 0300 95.4 kg (210 lb 6.4 oz)   04/19/23 0411 97.2 kg (214 lb 3.2 oz)   04/18/23 1900 96.9 kg (213 lb 10 oz)   04/16/23 1900 99 kg (218 lb 4.1 oz)   04/16/23 1332 95.3 kg (210 lb)   04/15/23 0530 97 kg (213 lb 13.5 oz)   04/15/23 0114 95.3 kg (210 lb)   04/03/23 0954 96.6 kg (213 lb)       PHYSICAL EXAM  GENERAL: well built elderly male in no apparent distress alert and oriented.   HEENT: Normocephalic. Pupils normal and conjunctivae normal.   NECK: No JVD. No bruit..   THYROID: Thyroid not examined  CARDIAC: Regular rate and rhythm. S1 is normal.S2 is normal.  2/6 systolic murmur  CHEST ANATOMY: normal.   LUNGS:  Diminished lower lobes  ABDOMEN: Soft Normal bowel sounds. Nontender  URINARY: No farrar catheter   EXTREMITIES: No cyanosis, clubbing or edema noted at this time.  PERIPHERAL VASCULAR SYSTEM: Good palpable distal pulses.   CENTRAL NERVOUS SYSTEM: No focal motor or sensory deficits noted.   SKIN: Skin without lesions, moist, well perfused.   MUSCLE STRENGTH & TONE: No noteable weakness, atrophy or abnormal movement.     HOME MEDICATIONS:  No current facility-administered medications on file prior to encounter.     Current Outpatient Medications on File Prior to Encounter   Medication Sig Dispense Refill    metoprolol succinate (TOPROL-XL) 50 MG 24 hr tablet Take 1 tablet (50 mg total) by mouth once daily. 90 tablet 3    amiodarone (PACERONE) 200 MG Tab Take 1 tablet (200 mg total) by mouth 3 (three) times daily. 90 tablet 11    aspirin 81 MG Chew Take 1 tablet (81 mg total) by mouth once daily.  0    butalbital-acetaminophen-caffeine -40 mg (FIORICET, ESGIC) -40 mg per tablet  Take 1 tablet by mouth every 4 (four) hours as needed for Headaches. 20 tablet 0    furosemide (LASIX) 40 MG tablet Take 1 tablet (40 mg total) by mouth once daily. 90 tablet 3    lisinopriL (PRINIVIL,ZESTRIL) 20 MG tablet Take 1 tablet (20 mg total) by mouth once daily. 90 tablet 3    LORazepam (ATIVAN) 0.5 MG tablet Take 1 tablet (0.5 mg total) by mouth every 8 (eight) hours as needed for Anxiety. 30 tablet 0    nitroGLYCERIN (NITROSTAT) 0.4 MG SL tablet Place 1 tablet (0.4 mg total) under the tongue every 5 (five) minutes as needed for Chest pain. 25 tablet 6    pantoprazole (PROTONIX) 40 MG tablet Take 40 mg by mouth once daily.      potassium chloride (KLOR-CON) 10 MEQ TbSR Take 1 tablet (10 mEq total) by mouth every other day. 45 tablet 3    spironolactone (ALDACTONE) 25 MG tablet Take 1 tablet (25 mg total) by mouth once daily. 90 tablet 3    tamsulosin (FLOMAX) 0.4 mg Cap Take 1 capsule by mouth once daily.         SCHEDULED MEDS:   amiodarone  200 mg Oral TID    aspirin  81 mg Oral Daily    chlorhexidine  15 mL Mouth/Throat BID    enoxaparin  40 mg Subcutaneous Daily    furosemide (LASIX) injection  40 mg Intravenous Daily    lisinopriL  5 mg Oral Daily    magnesium oxide  400 mg Oral Daily    metoprolol succinate  25 mg Oral Daily    mupirocin   Nasal BID    pantoprazole  40 mg Oral Before breakfast    potassium chloride  10 mEq Oral Every other day    sertraline  50 mg Oral QHS    spironolactone  25 mg Oral Daily       CONTINUOUS INFUSIONS:          PRN MEDS:acetaminophen, butalbital-acetaminophen-caffeine -40 mg, LORazepam, magnesium oxide, magnesium oxide, melatonin, nitroGLYCERIN, ondansetron, polyethylene glycol, potassium bicarbonate, potassium bicarbonate, potassium bicarbonate, sodium chloride 0.9%    LABS AND DIAGNOSTICS     CBC LAST 3 DAYS  Recent Labs   Lab 04/18/23  0526 04/19/23  0424 04/20/23  0433   WBC 7.42 7.01 6.56   RBC 4.28* 4.19* 4.21*   HGB 12.9* 13.0* 12.9*   HCT 40.4 39.4*  40.1   MCV 94 94 95   MCH 30.1 31.0 30.6   MCHC 31.9* 33.0 32.2   RDW 15.4* 15.1* 15.1*    193 169   MPV 11.1 11.6 11.0   GRAN 60.3  4.5 62.2  4.4 65.6  4.3   LYMPH 25.1  1.9 22.1  1.6 17.7*  1.2   MONO 9.0  0.7 8.6  0.6 10.1  0.7   BASO 0.06 0.06 0.05   NRBC 0 0 0       COAGULATION LAST 3 DAYS  Recent Labs   Lab 04/15/23  0135 04/18/23  0527   INR 1.2 1.0   APTT  --  30.2       CHEMISTRY LAST 3 DAYS  Recent Labs   Lab 04/18/23  0526 04/19/23  0423 04/19/23  1757 04/20/23  0432    136 138 139   K 4.1 3.9 3.9 4.1    109 98 105   CO2 25 22* 24 24   ANIONGAP 6* 5* 16 10   BUN 15 13 14 14   CREATININE 0.7 0.7 0.8 0.7    93 96 92   CALCIUM 9.2 8.9 9.6 9.2   MG 2.1 2.0  --  1.9   ALBUMIN 4.0 4.0  --  3.8   PROT 7.8 7.6  --  7.4   ALKPHOS 74 73  --  69   ALT 22 20  --  20   AST 18 19  --  19   BILITOT 0.5 0.5  --  0.6       CARDIAC PROFILE LAST 3 DAYS  Recent Labs   Lab 04/15/23  0135 04/15/23  0518 04/15/23  0902 04/16/23  1450 04/16/23  1631   *  --   --  433*  --    TROPONINIHS 16.6*   < > 14.5 11.3 11.6    < > = values in this interval not displayed.       ENDOCRINE LAST 3 DAYS  Recent Labs   Lab 04/16/23  1450   TSH 8.440*       LAST ARTERIAL BLOOD GAS  ABG  No results for input(s): PH, PO2, PCO2, HCO3, BE in the last 168 hours.    LAST 7 DAYS MICROBIOLOGY   Microbiology Results (last 7 days)       ** No results found for the last 168 hours. **            MOST RECENT IMAGING  X-Ray Chest AP Portable  CLINICAL HISTORY:  67 years (1955) Male Ventricular tachycardia Ventricular tachycardia    TECHNIQUE:  Portable AP radiograph the chest.    COMPARISON:  Most recent radiograph from April 16, 2023    FINDINGS:  There is vascular congestion with increased interstitial markings findings indicating mild pulmonary edema.  There is blunting of both costophrenic angles consistent with trace pleural effusions and adjacent atelectasis. No pneumothorax is identified. The heart is  mildly enlarged. The median sternotomy wires and the left sided AICD pacemaker are unchanged. Atheromatous calcifications are seen at the aortic arch. Osseous structures appear unchanged. The visualized upper abdomen is unremarkable.    IMPRESSION:  Cardiomegaly and findings of mild interstitial pulmonary edema at the lung bases.    .    Electronically signed by:  Moe Huang MD  4/19/2023 11:10 AM CDT Workstation: [id number]      ECHOCARDIOGRAM RESULTS (last 5)  Results for orders placed during the hospital encounter of 02/21/23    Echo    Interpretation Summary  · The left ventricle is mildly enlarged with moderate concentric hypertrophy evidence of old apical scar seen on apical four-chamber view  · The estimated ejection fraction is 34%.  · Grade III left ventricular diastolic dysfunction. Mean left atrial pressure 11  · There are segmental left ventricular wall motion abnormalities.  · Atrial fibrillation not observed.  · Normal right ventricular size with mildly reduced right ventricular systolic function.  · Mild-to-moderate aortic regurgitation.  · Severe mitral regurgitation.  · Intermediate central venous pressure (8 mmHg).  · The estimated PA systolic pressure is 30 mmHg. Mild pulmonary hypertension is present    Patient has sinus rhythm      CURRENT/PREVIOUS VISIT EKG  Results for orders placed or performed during the hospital encounter of 04/16/23   EKG 12-lead    Collection Time: 04/19/23 12:18 AM    Narrative    Test Reason : R07.9,    Vent. Rate : 074 BPM     Atrial Rate : 074 BPM     P-R Int : 188 ms          QRS Dur : 110 ms      QT Int : 424 ms       P-R-T Axes : 075 -02 107 degrees     QTc Int : 470 ms    Sinus rhythm with Premature atrial complexes  Septal infarct (cited on or before 18-DEC-2016)  Abnormal ECG  When compared with ECG of 16-APR-2023 13:34,  Previous ECG has undetermined rhythm, needs review  Non-specific change in ST segment in Lateral leads  Nonspecific T wave abnormality  has replaced inverted T waves in Lateral  leads    Referred By: [email or web address]   SELF           Confirmed By:            ASSESSMENT/PLAN:     There are no active hospital problems to display for this patient.      ASSESSMENT & PLAN:     Ventricular tachycardia s/p ICD discharge  HFrEF s/p ICD  CAD s/p CABG  H/o anterior MI 2001, LIMA to LAD 7/2004(reportedly 2 vessel bypass, need OR report to confirm), no dz in Lcx and RCA at that time  Severe MR, re-eval once on GDMT      RECOMMENDATIONS:    No further VT overnight.  Patient transitioned to oral amiodarone.  Nuclear stress test negative for reversible ischemia this admission.   Encouraged patient to take medications as prescribed. Recommend compliance with medication regimen.   Continue aspirin 81 mg daily. Continue lisinopril 5 mg daily.  Continue metoprolol 25mg daily. Continue spironolactone 25 mg daily.  Hold antihypertensives for SBP <100.   We will give IV Lasix 40 mg today and repeat BNP and chest x-ray next a.m.. Recent ECHO shows severe MR, moderate AR, EF 34% with grade III diastolic dysfunction. Strict I's and O's. 1.5L fluid restriction. 2g low sodium diet.   Continue to check and replace potassium and magnesium. Goal for potassium is 4.0, and goal for magnesium is 2.0.    We will continue to follow.      Shirlene Oconnell NP  Department of Cardiology  Date of Service: 04/20/2023

## 2023-04-20 NOTE — PLAN OF CARE
04/20/23 1055   Patient Assessment/Suction   Level of Consciousness (AVPU) alert   Respiratory Effort Normal;Unlabored   PRE-TX-O2   Device (Oxygen Therapy) room air   SpO2 95 %   Pulse Oximetry Type Intermittent   $ Pulse Oximetry - Multiple Charge Pulse Oximetry - Multiple   Pulse (!) 55   Resp 18   Education   $ Education 15 min   Respiratory Evaluation   $ Care Plan Tech Time 15 min

## 2023-04-20 NOTE — PLAN OF CARE
Problem: Adult Inpatient Plan of Care  Goal: Patient-Specific Goal (Individualized)  Outcome: Ongoing, Progressing     Recommendations  1.) Continue cardiac diet restrictions.   2.) Suggest MVI for general health.   3.)  to aid in food preferences.     Goals:   1.) Pt to consume/tolerate >75% of meals.  Nutrition Goal Status: new

## 2023-04-20 NOTE — PROGRESS NOTES
"[facility]  Adult Nutrition   Progress Note (Initial Assessment)     SUMMARY     Recommendations  1.) Continue cardiac diet restrictions.   2.) Suggest MVI for general health.   3.)  to aid in food preferences.    Goals:   1.) Pt to consume/tolerate >75% of meals.  Nutrition Goal Status: new    Dietitian Rounds Brief  Pt presenting with chest pain and palpitations. Pt c/o stress and depression which interferes with PO intakes and medical issues. He states his appetite is fair, consuming 2 meals/day. Pt with good PO intakes since admit per flowsheets. No chew/swallowing issues. No GI distress. LBM 4/19. Skin: intact. Wt reviewed. Noted 4% wt loss x1 month, likely related to diuresis. NFPE completed with no s/s of wasting. No malnutrition identified.    Diet order:   Current Diet Order: cardiac      Evaluation of Received Nutrient/Fluid Intake  Energy Calories Required: meeting needs  Protein Required: meeting needs  Fluid Required: meeting needs  Tolerance: tolerating     % Intake of Estimated Energy Needs: 75 - 100 %  % Meal Intake: 75 - 100 %      Intake/Output Summary (Last 24 hours) at 4/20/2023 1623  Last data filed at 4/20/2023 1105  Gross per 24 hour   Intake 120 ml   Output 2975 ml   Net -2855 ml        Anthropometrics  Temp: 98.1 °F (36.7 °C)  Height Method: Stated  Height: 6' 1" (185.4 cm)  Height (inches): 73 in  Weight Method: Bed Scale  Weight: 93.4 kg (205 lb 14.6 oz)  Weight (lb): 205.91 lb  Ideal Body Weight (IBW), Male: 184 lb  % Ideal Body Weight, Male (lb): 118.62 %  BMI (Calculated): 27.2  BMI Grade: 25 - 29.9 - overweight       Estimated/Assessed Needs  Weight Used For Calorie Calculations: 93.4 kg (205 lb 14.6 oz)  Energy Calorie Requirements (kcal): 3430-3688  Energy Need Method: Kcal/kg (20-25)  Protein Requirements: 75-94 (0.8-1.0)  Weight Used For Protein Calculations: 93.4 kg (205 lb 14.6 oz)  Fluid Requirements (mL): 3264-7339     RDA Method (mL): 1868       Reason " for Assessment  Reason For Assessment: length of stay  Diagnosis: cardiac disease  Relevant Medical History: CAD status post stenting, hyperlipidemia, hypertension, chronic combined CHF status post AICD, tobacco use    Nutrition/Diet History  Food Allergies: NKFA  Factors Affecting Nutritional Intake: None identified at this time    Nutrition Risk Screen  Nutrition Risk Screen: no indicators present     MST Score: 0  Have you recently lost weight without trying?: No  Weight loss score: 0  Have you been eating poorly because of a decreased appetite?: No  Appetite score: 0       Weight History:  Wt Readings from Last 5 Encounters:   04/20/23 93.4 kg (205 lb 14.6 oz)   04/15/23 97 kg (213 lb 13.5 oz)   04/03/23 96.6 kg (213 lb)   03/07/23 94.8 kg (209 lb)   02/24/23 94.3 kg (208 lb)        Lab/Procedures/Meds: Pertinent Labs/Meds Reviewed    Medications:Pertinent Medications Reviewed  Scheduled Meds:   amiodarone  200 mg Oral TID    aspirin  81 mg Oral Daily    chlorhexidine  15 mL Mouth/Throat BID    enoxaparin  40 mg Subcutaneous Daily    furosemide (LASIX) injection  40 mg Intravenous Daily    lisinopriL  5 mg Oral Daily    magnesium oxide  400 mg Oral Daily    metoprolol succinate  25 mg Oral Daily    mupirocin   Nasal BID    pantoprazole  40 mg Oral Before breakfast    potassium chloride  10 mEq Oral Every other day    sertraline  50 mg Oral QHS    spironolactone  25 mg Oral Daily     Continuous Infusions:  PRN Meds:.acetaminophen, butalbital-acetaminophen-caffeine -40 mg, LORazepam, magnesium oxide, magnesium oxide, melatonin, nitroGLYCERIN, ondansetron, polyethylene glycol, potassium bicarbonate, potassium bicarbonate, potassium bicarbonate, sodium chloride 0.9%    Labs: Pertinent Labs Reviewed  Clinical Chemistry:  Recent Labs   Lab 04/20/23  0432      K 4.1      CO2 24   GLU 92   BUN 14   CREATININE 0.7   CALCIUM 9.2   PROT 7.4   ALBUMIN 3.8   BILITOT 0.6   ALKPHOS 69   AST 19   ALT 20    ANIONGAP 10   MG 1.9     CBC:   Recent Labs   Lab 04/20/23  0433   WBC 6.56   RBC 4.21*   HGB 12.9*   HCT 40.1      MCV 95   MCH 30.6   MCHC 32.2     Cardiac Profile:  Recent Labs   Lab 04/15/23  0135 04/16/23  1450   * 433*     Thyroid & Parathyroid:  Recent Labs   Lab 04/16/23  1450   TSH 8.440*   FREET4 0.83       Monitor and Evaluation  Food and Nutrient Intake: energy intake, food and beverage intake  Food and Nutrient Adminstration: diet order  Knowledge/Beliefs/Attitudes: food and nutrition knowledge/skill  Physical Activity and Function: nutrition-related ADLs and IADLs  Anthropometric Measurements: weight, weight change  Biochemical Data, Medical Tests and Procedures: electrolyte and renal panel, gastrointestinal profile, glucose/endocrine profile  Nutrition-Focused Physical Findings: overall appearance     Nutrition Risk  Level of Risk/Frequency of Follow-up: low     Nutrition Follow-Up  RD Follow-up?: Yes      Nicolle Manriquez RD 04/20/2023 4:23 PM

## 2023-04-21 ENCOUNTER — DOCUMENT SCAN (OUTPATIENT)
Dept: HOME HEALTH SERVICES | Facility: HOSPITAL | Age: 68
End: 2023-04-21
Payer: MEDICARE

## 2023-04-21 LAB
ALBUMIN SERPL BCP-MCNC: 3.8 G/DL (ref 3.5–5.2)
ALP SERPL-CCNC: 74 U/L (ref 55–135)
ALT SERPL W/O P-5'-P-CCNC: 17 U/L (ref 10–44)
ANION GAP SERPL CALC-SCNC: 5 MMOL/L (ref 8–16)
AST SERPL-CCNC: 14 U/L (ref 10–40)
BASOPHILS # BLD AUTO: 0.06 K/UL (ref 0–0.2)
BASOPHILS NFR BLD: 0.9 % (ref 0–1.9)
BILIRUB SERPL-MCNC: 0.9 MG/DL (ref 0.1–1)
BNP SERPL-MCNC: 588 PG/ML (ref 0–99)
BUN SERPL-MCNC: 17 MG/DL (ref 8–23)
CALCIUM SERPL-MCNC: 8.8 MG/DL (ref 8.7–10.5)
CHLORIDE SERPL-SCNC: 106 MMOL/L (ref 95–110)
CO2 SERPL-SCNC: 25 MMOL/L (ref 23–29)
CREAT SERPL-MCNC: 0.8 MG/DL (ref 0.5–1.4)
DIFFERENTIAL METHOD: ABNORMAL
EOSINOPHIL # BLD AUTO: 0.3 K/UL (ref 0–0.5)
EOSINOPHIL NFR BLD: 4.3 % (ref 0–8)
ERYTHROCYTE [DISTWIDTH] IN BLOOD BY AUTOMATED COUNT: 15.1 % (ref 11.5–14.5)
EST. GFR  (NO RACE VARIABLE): >60 ML/MIN/1.73 M^2
GLUCOSE SERPL-MCNC: 95 MG/DL (ref 70–110)
HCT VFR BLD AUTO: 37.8 % (ref 40–54)
HGB BLD-MCNC: 12.5 G/DL (ref 14–18)
IMM GRANULOCYTES # BLD AUTO: 0.02 K/UL (ref 0–0.04)
IMM GRANULOCYTES NFR BLD AUTO: 0.3 % (ref 0–0.5)
LYMPHOCYTES # BLD AUTO: 1.2 K/UL (ref 1–4.8)
LYMPHOCYTES NFR BLD: 17.3 % (ref 18–48)
MAGNESIUM SERPL-MCNC: 2 MG/DL (ref 1.6–2.6)
MCH RBC QN AUTO: 30.6 PG (ref 27–31)
MCHC RBC AUTO-ENTMCNC: 33.1 G/DL (ref 32–36)
MCV RBC AUTO: 93 FL (ref 82–98)
MONOCYTES # BLD AUTO: 0.7 K/UL (ref 0.3–1)
MONOCYTES NFR BLD: 10.8 % (ref 4–15)
NEUTROPHILS # BLD AUTO: 4.5 K/UL (ref 1.8–7.7)
NEUTROPHILS NFR BLD: 66.4 % (ref 38–73)
NRBC BLD-RTO: 0 /100 WBC
PLATELET # BLD AUTO: 178 K/UL (ref 150–450)
PMV BLD AUTO: 11 FL (ref 9.2–12.9)
POTASSIUM SERPL-SCNC: 3.9 MMOL/L (ref 3.5–5.1)
PROT SERPL-MCNC: 7.1 G/DL (ref 6–8.4)
RBC # BLD AUTO: 4.08 M/UL (ref 4.6–6.2)
SODIUM SERPL-SCNC: 136 MMOL/L (ref 136–145)
WBC # BLD AUTO: 6.75 K/UL (ref 3.9–12.7)

## 2023-04-21 PROCEDURE — 21400001 HC TELEMETRY ROOM

## 2023-04-21 PROCEDURE — 85025 COMPLETE CBC W/AUTO DIFF WBC: CPT | Performed by: INTERNAL MEDICINE

## 2023-04-21 PROCEDURE — 63600175 PHARM REV CODE 636 W HCPCS: Performed by: INTERNAL MEDICINE

## 2023-04-21 PROCEDURE — 25000003 PHARM REV CODE 250

## 2023-04-21 PROCEDURE — 99233 SBSQ HOSP IP/OBS HIGH 50: CPT | Mod: ,,, | Performed by: INTERNAL MEDICINE

## 2023-04-21 PROCEDURE — 83880 ASSAY OF NATRIURETIC PEPTIDE: CPT

## 2023-04-21 PROCEDURE — 25000003 PHARM REV CODE 250: Performed by: INTERNAL MEDICINE

## 2023-04-21 PROCEDURE — 83735 ASSAY OF MAGNESIUM: CPT | Performed by: INTERNAL MEDICINE

## 2023-04-21 PROCEDURE — 99233 PR SUBSEQUENT HOSPITAL CARE,LEVL III: ICD-10-PCS | Mod: ,,, | Performed by: INTERNAL MEDICINE

## 2023-04-21 PROCEDURE — 80053 COMPREHEN METABOLIC PANEL: CPT | Performed by: INTERNAL MEDICINE

## 2023-04-21 PROCEDURE — 36415 COLL VENOUS BLD VENIPUNCTURE: CPT | Performed by: INTERNAL MEDICINE

## 2023-04-21 PROCEDURE — 63600175 PHARM REV CODE 636 W HCPCS

## 2023-04-21 RX ORDER — FUROSEMIDE 10 MG/ML
40 INJECTION INTRAMUSCULAR; INTRAVENOUS
Status: DISCONTINUED | OUTPATIENT
Start: 2023-04-21 | End: 2023-04-22

## 2023-04-21 RX ADMIN — LORAZEPAM 0.5 MG: 0.5 TABLET ORAL at 09:04

## 2023-04-21 RX ADMIN — ACETAMINOPHEN 650 MG: 325 TABLET ORAL at 09:04

## 2023-04-21 RX ADMIN — ASPIRIN 81 MG 81 MG: 81 TABLET ORAL at 09:04

## 2023-04-21 RX ADMIN — METOPROLOL SUCCINATE 25 MG: 25 TABLET, EXTENDED RELEASE ORAL at 03:04

## 2023-04-21 RX ADMIN — PANTOPRAZOLE SODIUM 40 MG: 40 TABLET, DELAYED RELEASE ORAL at 07:04

## 2023-04-21 RX ADMIN — MUPIROCIN 1 G: 20 OINTMENT TOPICAL at 09:04

## 2023-04-21 RX ADMIN — LORAZEPAM 0.5 MG: 0.5 TABLET ORAL at 03:04

## 2023-04-21 RX ADMIN — SPIRONOLACTONE 25 MG: 25 TABLET ORAL at 09:04

## 2023-04-21 RX ADMIN — ACETAMINOPHEN 650 MG: 325 TABLET ORAL at 03:04

## 2023-04-21 RX ADMIN — MELATONIN 9 MG: at 09:04

## 2023-04-21 RX ADMIN — Medication 400 MG: at 09:04

## 2023-04-21 RX ADMIN — CHLORHEXIDINE GLUCONATE 15 ML: 1.2 RINSE ORAL at 09:04

## 2023-04-21 RX ADMIN — ACETAMINOPHEN 650 MG: 325 TABLET ORAL at 07:04

## 2023-04-21 RX ADMIN — FUROSEMIDE 40 MG: 10 INJECTION, SOLUTION INTRAVENOUS at 09:04

## 2023-04-21 RX ADMIN — SERTRALINE HYDROCHLORIDE 50 MG: 50 TABLET ORAL at 09:04

## 2023-04-21 RX ADMIN — AMIODARONE HYDROCHLORIDE 200 MG: 200 TABLET ORAL at 03:04

## 2023-04-21 RX ADMIN — FUROSEMIDE 40 MG: 10 INJECTION, SOLUTION INTRAVENOUS at 06:04

## 2023-04-21 RX ADMIN — AMIODARONE HYDROCHLORIDE 200 MG: 200 TABLET ORAL at 09:04

## 2023-04-21 NOTE — PROGRESS NOTES
[facility]  Department of Cardiology  Consult Note      PATIENT NAME: Brandon Rivas  MRN: [id number]  TODAY'S DATE: 04/21/2023  ADMIT DATE: 4/16/2023                          CONSULT REQUESTED BY: Jair Leon MD    SUBJECTIVE     PRINCIPAL PROBLEM: <principal problem not specified>      REASON FOR CONSULT:   Ventricular Tachycardia    INTERVAL HISTORY  04/21/2023  Patient is resting comfortably in bed during examination.  No acute distress.  Vital signs stable.  Patient is in sinus rhythm sinus Perez overnight.  Hemodynamically stable.  No events overnight.  No further ventricular tachycardia.  Chest x-ray shows minimal improvement and BNP is worsened.  Patient denies shortness of breath at this time but becomes winded with prolonged conversation during exam.    4/20/23  Patient is resting comfortably.  NAD.  VSS. A&O x 4.  Hemodynamically stable.  No VT overnight.  Patient was transitioned to oral amiodarone today and taken off of amiodarone drip.-1.6 L yesterday and-1.1 L today.  Patient states his breathing is improving.  He denies shortness of breath and dizziness.      04/19/2023    Patient is resting comfortably in bed during examination.  No acute distress.  Chest x-ray shows interstitial pulmonary edema at the lung bases.  Patient has been refusing to take his lisinopril and diuretics today.  However patient is agreeable to starting his lisinopril and IV Lasix this afternoon.  Discussed importance of medication compliance with patient.  Patient had multiple episodes of nonsustained VT overnight.  Patient is started back on amiodarone drip.    4/18/23  Patient is exam during nuclear stress test patient complained of dizziness, chest discomfort and shortness of breath during stress portion of test.  Symptoms resolved quickly.  Per RN patient has been refusing his p.o. medications, Lasix, spironolactone, and lisinopril.  No acute changes noted on EKG.  Patient was sinus rhythm  overnight with no events on telemetry.  No further VT.      4/17/23    Patient was discharged yesterday afternoon at approximately 1:00 a.m. later re-presented to the ED with complaints of persistent shortness of breath, palpitations, impending doom and he was found to be in nonsustained VT.  Symptoms lasted approximately 5-10 minutes.  Patient had VT overnight as well.  Patient has refused amiodarone drip this morning but did receive amiodarone at 1 milligram/hour overnight.  Patient states amiodarone gives him dizziness and feelings of shortness of breath.  No acute distress during examination.  Sinus rhythm on the monitor.  98% on room air.  Patient appears anxious.    4/16/23    Patient is resting comfortably in bed during examination, sinus rhythm on the monitor heart rate 56.  Blood pressure 118/67.  2 L nasal cannula patient was still on amiodarone drip at 0.5 milligrams/hour.  No acute complaints at this time.  Mild dizziness.  No events on telemetry.      HPI:    Patient is a 67-year-old male with CAD s/p stent, hyperlipidemia, hypertension, chronic combined CHF s/p AICD who presented to the ED with chest pain and palpitations.  AICD fired last week with increased palpitations and chest discomfort since.  Heart rate at home was 180.  Patient was found to be in VT versus SVT and was treated with 5 mg of IV metoprolol, 2 g magnesium, and 150 mg amiodarone push.  Amiodarone infusion was initiated.  Patient is anxious during examination.  He was stress about amiodarone as he is concerned it will cause lung problems.  Questionable compliance with home medications.    During examination he was anxious, in sinus rhythm, and on amiodarone drip.  No edema.  No acute distress.            FROM H&P   Chest Pain       Chest pain x 1 day. States heart rate was in 180's.  Took 4 ntg at home.          HPI: 67-year-old man with CAD status post stenting, hyperlipidemia, hypertension, chronic combined CHF status post AICD,  "tobacco use, presenting with chest pain and palpitations.  Patient reports last week AICD firing.  He has had intermittent palpitations with chest pain since, and they worsened today.  He measured his heart rate at 180 at home.   He presented to the ED where EKG showed V-tach versus SVT.  He was treated with 5 mg IV metoprolol, 2 g magnesium, and 150 mg amiodarone push.  Cardiology was contacted and he was started on amiodarone infusion.  He has gone in and out of wide complex tachycardia while in the ED with stable blood pressure and mentation.    Patient reports he has been very depressed and stressed about his medical problems and this tends to make his  heart rate increase.  He takes amiodarone but only once daily instead of twice because he is concerned about lung effects.  He is noncompliant with Lasix because it "tears up his insides".  He reports compliance with his metoprolol.  He continues to smoke cigars a few puffs a day.  He no longer uses alcohol.  He was recently treated for a UTI which he states is resolved and he had a urinalysis last week showing clearance.  He denies current chest pain, fever, chills, cough, dizziness, syncope, shortness of breath, vision changes.         Review of patient's allergies indicates:   Allergen Reactions    Bactrim [sulfamethoxazole-trimethoprim] Shortness Of Breath    Statins-hmg-coa reductase inhibitors Other (See Comments)     Statin intolerance. Muscle weakness    Wellbutrin [bupropion hcl] Other (See Comments)     Abdominal pain        Past Medical History:   Diagnosis Date    Coronary artery disease     Hypertension     MI (myocardial infarction)     Thyroid disease      Past Surgical History:   Procedure Laterality Date    CARDIAC PACEMAKER PLACEMENT       Social History     Tobacco Use    Smoking status: Every Day     Packs/day: 1.00     Years: 40.00     Pack years: 40.00     Types: Cigarettes    Smokeless tobacco: Never   Substance Use Topics    Alcohol use: " Yes     Comment: 12 pack beer daily    Drug use: No        REVIEW OF SYSTEMS  As per mentioned in HPI    OBJECTIVE     VITAL SIGNS (Most Recent)  Temp: 97.7 °F (36.5 °C) (04/21/23 1557)  Pulse: 62 (04/21/23 1557)  Resp: 18 (04/21/23 1557)  BP: 121/64 (04/21/23 1557)  SpO2: 96 % (04/21/23 1557)    VENTILATION STATUS  Resp: 18 (04/21/23 1557)  SpO2: 96 % (04/21/23 1557)           I & O (Last 24H):  Intake/Output Summary (Last 24 hours) at 4/21/2023 1636  Last data filed at 4/21/2023 1310  Gross per 24 hour   Intake 960 ml   Output --   Net 960 ml       WEIGHTS  Wt Readings from Last 3 Encounters:   04/21/23 0410 94.8 kg (209 lb 1 oz)   04/20/23 1107 93.4 kg (205 lb 14.6 oz)   04/20/23 0300 95.4 kg (210 lb 6.4 oz)   04/19/23 0411 97.2 kg (214 lb 3.2 oz)   04/18/23 1900 96.9 kg (213 lb 10 oz)   04/16/23 1900 99 kg (218 lb 4.1 oz)   04/16/23 1332 95.3 kg (210 lb)   04/15/23 0530 97 kg (213 lb 13.5 oz)   04/15/23 0114 95.3 kg (210 lb)   04/03/23 0954 96.6 kg (213 lb)       PHYSICAL EXAM  GENERAL: well built elderly male in no apparent distress alert and oriented.   HEENT: Normocephalic. Pupils normal and conjunctivae normal.   NECK: No JVD. No bruit..   THYROID: Thyroid not examined  CARDIAC: Regular rate and rhythm. S1 is normal.S2 is normal.  2/6 systolic murmur  CHEST ANATOMY: normal.   LUNGS:  Diminished lower lobes  ABDOMEN: Soft Normal bowel sounds. Nontender  URINARY: No farrar catheter   EXTREMITIES: No cyanosis, clubbing or edema noted at this time.  PERIPHERAL VASCULAR SYSTEM: Good palpable distal pulses.   CENTRAL NERVOUS SYSTEM: No focal motor or sensory deficits noted.   SKIN: Skin without lesions, moist, well perfused.   MUSCLE STRENGTH & TONE: No noteable weakness, atrophy or abnormal movement.     HOME MEDICATIONS:  No current facility-administered medications on file prior to encounter.     Current Outpatient Medications on File Prior to Encounter   Medication Sig Dispense Refill    metoprolol succinate  (TOPROL-XL) 50 MG 24 hr tablet Take 1 tablet (50 mg total) by mouth once daily. 90 tablet 3    amiodarone (PACERONE) 200 MG Tab Take 1 tablet (200 mg total) by mouth 3 (three) times daily. 90 tablet 11    aspirin 81 MG Chew Take 1 tablet (81 mg total) by mouth once daily.  0    butalbital-acetaminophen-caffeine -40 mg (FIORICET, ESGIC) -40 mg per tablet Take 1 tablet by mouth every 4 (four) hours as needed for Headaches. 20 tablet 0    furosemide (LASIX) 40 MG tablet Take 1 tablet (40 mg total) by mouth once daily. 90 tablet 3    lisinopriL (PRINIVIL,ZESTRIL) 20 MG tablet Take 1 tablet (20 mg total) by mouth once daily. 90 tablet 3    LORazepam (ATIVAN) 0.5 MG tablet Take 1 tablet (0.5 mg total) by mouth every 8 (eight) hours as needed for Anxiety. 30 tablet 0    nitroGLYCERIN (NITROSTAT) 0.4 MG SL tablet Place 1 tablet (0.4 mg total) under the tongue every 5 (five) minutes as needed for Chest pain. 25 tablet 6    pantoprazole (PROTONIX) 40 MG tablet Take 40 mg by mouth once daily.      potassium chloride (KLOR-CON) 10 MEQ TbSR Take 1 tablet (10 mEq total) by mouth every other day. 45 tablet 3    spironolactone (ALDACTONE) 25 MG tablet Take 1 tablet (25 mg total) by mouth once daily. 90 tablet 3    tamsulosin (FLOMAX) 0.4 mg Cap Take 1 capsule by mouth once daily.         SCHEDULED MEDS:   amiodarone  200 mg Oral TID    aspirin  81 mg Oral Daily    chlorhexidine  15 mL Mouth/Throat BID    enoxaparin  40 mg Subcutaneous Daily    furosemide (LASIX) injection  40 mg Intravenous Q12H    lisinopriL  5 mg Oral Daily    magnesium oxide  400 mg Oral Daily    metoprolol succinate  25 mg Oral Daily    mupirocin   Nasal BID    pantoprazole  40 mg Oral Before breakfast    potassium chloride  10 mEq Oral Every other day    sertraline  50 mg Oral QHS    spironolactone  25 mg Oral Daily       CONTINUOUS INFUSIONS:          PRN MEDS:acetaminophen, butalbital-acetaminophen-caffeine -40 mg, LORazepam, magnesium  oxide, magnesium oxide, melatonin, nitroGLYCERIN, ondansetron, polyethylene glycol, potassium bicarbonate, potassium bicarbonate, potassium bicarbonate, sodium chloride 0.9%    LABS AND DIAGNOSTICS     CBC LAST 3 DAYS  Recent Labs   Lab 04/19/23  0424 04/20/23  0433 04/21/23  0423   WBC 7.01 6.56 6.75   RBC 4.19* 4.21* 4.08*   HGB 13.0* 12.9* 12.5*   HCT 39.4* 40.1 37.8*   MCV 94 95 93   MCH 31.0 30.6 30.6   MCHC 33.0 32.2 33.1   RDW 15.1* 15.1* 15.1*    169 178   MPV 11.6 11.0 11.0   GRAN 62.2  4.4 65.6  4.3 66.4  4.5   LYMPH 22.1  1.6 17.7*  1.2 17.3*  1.2   MONO 8.6  0.6 10.1  0.7 10.8  0.7   BASO 0.06 0.05 0.06   NRBC 0 0 0       COAGULATION LAST 3 DAYS  Recent Labs   Lab 04/15/23  0135 04/18/23  0527   INR 1.2 1.0   APTT  --  30.2       CHEMISTRY LAST 3 DAYS  Recent Labs   Lab 04/19/23  0423 04/19/23  1757 04/20/23  0432 04/21/23  0423    138 139 136   K 3.9 3.9 4.1 3.9    98 105 106   CO2 22* 24 24 25   ANIONGAP 5* 16 10 5*   BUN 13 14 14 17   CREATININE 0.7 0.8 0.7 0.8   GLU 93 96 92 95   CALCIUM 8.9 9.6 9.2 8.8   MG 2.0  --  1.9 2.0   ALBUMIN 4.0  --  3.8 3.8   PROT 7.6  --  7.4 7.1   ALKPHOS 73  --  69 74   ALT 20  --  20 17   AST 19  --  19 14   BILITOT 0.5  --  0.6 0.9       CARDIAC PROFILE LAST 3 DAYS  Recent Labs   Lab 04/15/23  0135 04/15/23  0518 04/15/23  0902 04/16/23  1450 04/16/23  1631 04/21/23  0423   *  --   --  433*  --  588*   TROPONINIHS 16.6*   < > 14.5 11.3 11.6  --     < > = values in this interval not displayed.       ENDOCRINE LAST 3 DAYS  Recent Labs   Lab 04/16/23  1450   TSH 8.440*       LAST ARTERIAL BLOOD GAS  ABG  No results for input(s): PH, PO2, PCO2, HCO3, BE in the last 168 hours.    LAST 7 DAYS MICROBIOLOGY   Microbiology Results (last 7 days)       ** No results found for the last 168 hours. **            MOST RECENT IMAGING  X-Ray Chest AP Portable  Reason: pulmonary edema    FINDINGS:    Portable chest with comparison chest x-ray April  19, 2023 show unchanged cardiomediastinal silhouette. Median sternotomy wires and left AICD again noted.  There is very mild decrease of bilateral perihilar interstitial lung opacities. There is interval development of patchy hazy opacities of the peripheral right lung base. Pulmonary vasculature is normal. No acute osseous abnormality.    IMPRESSION:    1.  Very mild decrease of bilateral perihilar interstitial lung opacities.  2.  Interval development of small patchy hazy opacities in the peripheral right lung base. Findings could reflect atelectasis or infiltrate.    Electronically signed by:  Jair Abdi DO  4/21/2023 7:43 AM CDT Workstation: [id number]      ECHOCARDIOGRAM RESULTS (last 5)  Results for orders placed during the hospital encounter of 02/21/23    Echo    Interpretation Summary  · The left ventricle is mildly enlarged with moderate concentric hypertrophy evidence of old apical scar seen on apical four-chamber view  · The estimated ejection fraction is 34%.  · Grade III left ventricular diastolic dysfunction. Mean left atrial pressure 11  · There are segmental left ventricular wall motion abnormalities.  · Atrial fibrillation not observed.  · Normal right ventricular size with mildly reduced right ventricular systolic function.  · Mild-to-moderate aortic regurgitation.  · Severe mitral regurgitation.  · Intermediate central venous pressure (8 mmHg).  · The estimated PA systolic pressure is 30 mmHg. Mild pulmonary hypertension is present    Patient has sinus rhythm      CURRENT/PREVIOUS VISIT EKG  Results for orders placed or performed during the hospital encounter of 04/16/23   EKG 12-lead    Collection Time: 04/19/23 12:18 AM    Narrative    Test Reason : R07.9,    Vent. Rate : 074 BPM     Atrial Rate : 074 BPM     P-R Int : 188 ms          QRS Dur : 110 ms      QT Int : 424 ms       P-R-T Axes : 075 -02 107 degrees     QTc Int : 470 ms    Sinus rhythm with Premature atrial complexes  Septal  infarct (cited on or before 18-DEC-2016)  Abnormal ECG  When compared with ECG of 16-APR-2023 13:34,  Previous ECG has undetermined rhythm, needs review  Non-specific change in ST segment in Lateral leads  Nonspecific T wave abnormality has replaced inverted T waves in Lateral  leads  Confirmed by Sohan HERNÁNDEZ, Erlin MANZANARES ([id number]) on 4/20/2023 8:56:26 PM    Referred By: [email or web address]   SELF           Confirmed By:Erlin Parry MD           ASSESSMENT/PLAN:     There are no active hospital problems to display for this patient.      ASSESSMENT & PLAN:     Ventricular tachycardia s/p ICD discharge  HFrEF s/p ICD  CAD s/p CABG  H/o anterior MI 2001, LIMA to LAD 7/2004(reportedly 2 vessel bypass, need OR report to confirm), no dz in Lcx and RCA at that time  Severe MR, re-eval once on GDMT      RECOMMENDATIONS:    No further VT overnight.  Continue oral amiodarone.  Nuclear stress test negative for reversible ischemia this admission.   Encouraged patient to take medications as prescribed. Recommend compliance with medication regimen.   Continue aspirin 81 mg daily. Continue lisinopril 5 mg daily.  Continue metoprolol 25mg daily. Continue spironolactone 25 mg daily.  Hold antihypertensives for SBP <100.    this a.m. Very mild decrease of bilateral interstitial opacities and interval development small opacities in right lung base.  We will increase IV Lasix 40 mg to b.i.d. we will repeat chest x-ray next a.m..  Continue to check and replace potassium and magnesium. Goal for potassium is 4.0, and goal for magnesium is 2.0.    Recent ECHO shows severe MR, moderate AR, EF 34% with grade III diastolic dysfunction. Strict I's and O's. 1.5L fluid restriction. 2g low sodium diet.   Continue to check and replace potassium and magnesium. Goal for potassium is 4.0, and goal for magnesium is 2.0.    We will continue to follow.      Shirlene Oconnell NP  Department of Cardiology  Date of Service: 04/21/2023

## 2023-04-22 ENCOUNTER — HOSPITAL ENCOUNTER (INPATIENT)
Facility: HOSPITAL | Age: 68
LOS: 4 days | Discharge: HOME HEALTH | DRG: 274 | End: 2023-04-26
Attending: INTERNAL MEDICINE | Admitting: INTERNAL MEDICINE
Payer: MEDICARE

## 2023-04-22 VITALS
HEIGHT: 73 IN | RESPIRATION RATE: 20 BRPM | WEIGHT: 203.56 LBS | DIASTOLIC BLOOD PRESSURE: 70 MMHG | TEMPERATURE: 98 F | BODY MASS INDEX: 26.98 KG/M2 | HEART RATE: 130 BPM | SYSTOLIC BLOOD PRESSURE: 100 MMHG | OXYGEN SATURATION: 95 %

## 2023-04-22 DIAGNOSIS — R07.9 CHEST PAIN: ICD-10-CM

## 2023-04-22 DIAGNOSIS — I47.20 VENTRICULAR TACHYCARDIA: ICD-10-CM

## 2023-04-22 DIAGNOSIS — Z86.79 STATUS POST ABLATION OPERATION FOR ARRHYTHMIA: ICD-10-CM

## 2023-04-22 DIAGNOSIS — I47.20 VT (VENTRICULAR TACHYCARDIA): ICD-10-CM

## 2023-04-22 DIAGNOSIS — I47.29: ICD-10-CM

## 2023-04-22 DIAGNOSIS — Z98.890 STATUS POST ABLATION OPERATION FOR ARRHYTHMIA: ICD-10-CM

## 2023-04-22 DIAGNOSIS — I47.20 V-TACH: ICD-10-CM

## 2023-04-22 DIAGNOSIS — I50.42 CHRONIC COMBINED SYSTOLIC AND DIASTOLIC HEART FAILURE: Primary | ICD-10-CM

## 2023-04-22 DIAGNOSIS — I47.29 NSVT (NONSUSTAINED VENTRICULAR TACHYCARDIA): ICD-10-CM

## 2023-04-22 LAB
ALBUMIN SERPL BCP-MCNC: 3.9 G/DL (ref 3.5–5.2)
ALP SERPL-CCNC: 79 U/L (ref 55–135)
ALT SERPL W/O P-5'-P-CCNC: 15 U/L (ref 10–44)
ANION GAP SERPL CALC-SCNC: 6 MMOL/L (ref 8–16)
AST SERPL-CCNC: 14 U/L (ref 10–40)
BASOPHILS # BLD AUTO: 0.06 K/UL (ref 0–0.2)
BASOPHILS NFR BLD: 0.7 % (ref 0–1.9)
BILIRUB SERPL-MCNC: 1 MG/DL (ref 0.1–1)
BUN SERPL-MCNC: 15 MG/DL (ref 8–23)
CALCIUM SERPL-MCNC: 9 MG/DL (ref 8.7–10.5)
CHLORIDE SERPL-SCNC: 104 MMOL/L (ref 95–110)
CO2 SERPL-SCNC: 26 MMOL/L (ref 23–29)
CREAT SERPL-MCNC: 0.9 MG/DL (ref 0.5–1.4)
DIFFERENTIAL METHOD: ABNORMAL
EOSINOPHIL # BLD AUTO: 0.3 K/UL (ref 0–0.5)
EOSINOPHIL NFR BLD: 3.2 % (ref 0–8)
ERYTHROCYTE [DISTWIDTH] IN BLOOD BY AUTOMATED COUNT: 14.8 % (ref 11.5–14.5)
EST. GFR  (NO RACE VARIABLE): >60 ML/MIN/1.73 M^2
GLUCOSE SERPL-MCNC: 93 MG/DL (ref 70–110)
HCT VFR BLD AUTO: 40.9 % (ref 40–54)
HGB BLD-MCNC: 13.2 G/DL (ref 14–18)
IMM GRANULOCYTES # BLD AUTO: 0.02 K/UL (ref 0–0.04)
IMM GRANULOCYTES NFR BLD AUTO: 0.2 % (ref 0–0.5)
LYMPHOCYTES # BLD AUTO: 1.3 K/UL (ref 1–4.8)
LYMPHOCYTES NFR BLD: 15.7 % (ref 18–48)
MAGNESIUM SERPL-MCNC: 2 MG/DL (ref 1.6–2.6)
MCH RBC QN AUTO: 30.2 PG (ref 27–31)
MCHC RBC AUTO-ENTMCNC: 32.3 G/DL (ref 32–36)
MCV RBC AUTO: 94 FL (ref 82–98)
MONOCYTES # BLD AUTO: 1.2 K/UL (ref 0.3–1)
MONOCYTES NFR BLD: 13.7 % (ref 4–15)
NEUTROPHILS # BLD AUTO: 5.6 K/UL (ref 1.8–7.7)
NEUTROPHILS NFR BLD: 66.5 % (ref 38–73)
NRBC BLD-RTO: 0 /100 WBC
PLATELET # BLD AUTO: 186 K/UL (ref 150–450)
PMV BLD AUTO: 11.5 FL (ref 9.2–12.9)
POTASSIUM SERPL-SCNC: 3.7 MMOL/L (ref 3.5–5.1)
PROT SERPL-MCNC: 7.6 G/DL (ref 6–8.4)
RBC # BLD AUTO: 4.37 M/UL (ref 4.6–6.2)
SODIUM SERPL-SCNC: 136 MMOL/L (ref 136–145)
WBC # BLD AUTO: 8.48 K/UL (ref 3.9–12.7)

## 2023-04-22 PROCEDURE — 80053 COMPREHEN METABOLIC PANEL: CPT | Performed by: INTERNAL MEDICINE

## 2023-04-22 PROCEDURE — 93010 EKG 12-LEAD: ICD-10-PCS | Mod: ,,, | Performed by: SPECIALIST

## 2023-04-22 PROCEDURE — 99233 SBSQ HOSP IP/OBS HIGH 50: CPT | Mod: ,,, | Performed by: INTERNAL MEDICINE

## 2023-04-22 PROCEDURE — 63600175 PHARM REV CODE 636 W HCPCS

## 2023-04-22 PROCEDURE — 93005 ELECTROCARDIOGRAM TRACING: CPT | Performed by: SPECIALIST

## 2023-04-22 PROCEDURE — 63600175 PHARM REV CODE 636 W HCPCS: Performed by: NURSE PRACTITIONER

## 2023-04-22 PROCEDURE — 99900035 HC TECH TIME PER 15 MIN (STAT)

## 2023-04-22 PROCEDURE — 36415 COLL VENOUS BLD VENIPUNCTURE: CPT | Performed by: INTERNAL MEDICINE

## 2023-04-22 PROCEDURE — 99233 PR SUBSEQUENT HOSPITAL CARE,LEVL III: ICD-10-PCS | Mod: ,,, | Performed by: INTERNAL MEDICINE

## 2023-04-22 PROCEDURE — 85025 COMPLETE CBC W/AUTO DIFF WBC: CPT | Performed by: INTERNAL MEDICINE

## 2023-04-22 PROCEDURE — 25000003 PHARM REV CODE 250: Performed by: INTERNAL MEDICINE

## 2023-04-22 PROCEDURE — 63600175 PHARM REV CODE 636 W HCPCS: Performed by: INTERNAL MEDICINE

## 2023-04-22 PROCEDURE — 20000000 HC ICU ROOM

## 2023-04-22 PROCEDURE — 27000221 HC OXYGEN, UP TO 24 HOURS

## 2023-04-22 PROCEDURE — 63600175 PHARM REV CODE 636 W HCPCS: Performed by: STUDENT IN AN ORGANIZED HEALTH CARE EDUCATION/TRAINING PROGRAM

## 2023-04-22 PROCEDURE — 94761 N-INVAS EAR/PLS OXIMETRY MLT: CPT

## 2023-04-22 PROCEDURE — 25000003 PHARM REV CODE 250

## 2023-04-22 PROCEDURE — 93010 ELECTROCARDIOGRAM REPORT: CPT | Mod: ,,, | Performed by: SPECIALIST

## 2023-04-22 PROCEDURE — 93005 ELECTROCARDIOGRAM TRACING: CPT

## 2023-04-22 PROCEDURE — 93010 ELECTROCARDIOGRAM REPORT: CPT | Mod: 76,,, | Performed by: INTERNAL MEDICINE

## 2023-04-22 PROCEDURE — 93010 EKG 12-LEAD: ICD-10-PCS | Mod: 77,,, | Performed by: INTERNAL MEDICINE

## 2023-04-22 PROCEDURE — 83735 ASSAY OF MAGNESIUM: CPT | Performed by: INTERNAL MEDICINE

## 2023-04-22 RX ORDER — MUPIROCIN 20 MG/G
OINTMENT TOPICAL 2 TIMES DAILY
Status: DISCONTINUED | OUTPATIENT
Start: 2023-04-23 | End: 2023-04-26 | Stop reason: HOSPADM

## 2023-04-22 RX ORDER — POTASSIUM CHLORIDE 750 MG/1
10 CAPSULE, EXTENDED RELEASE ORAL EVERY OTHER DAY
Status: DISCONTINUED | OUTPATIENT
Start: 2023-04-24 | End: 2023-04-26 | Stop reason: HOSPADM

## 2023-04-22 RX ORDER — LIDOCAINE HYDROCHLORIDE ANHYDROUS AND DEXTROSE MONOHYDRATE .8; 5 G/100ML; G/100ML
1 INJECTION, SOLUTION INTRAVENOUS CONTINUOUS
Status: DISCONTINUED | OUTPATIENT
Start: 2023-04-22 | End: 2023-04-24

## 2023-04-22 RX ORDER — METOPROLOL SUCCINATE 25 MG/1
25 TABLET, EXTENDED RELEASE ORAL DAILY
Status: DISCONTINUED | OUTPATIENT
Start: 2023-04-23 | End: 2023-04-26 | Stop reason: HOSPADM

## 2023-04-22 RX ORDER — LISINOPRIL 5 MG/1
5 TABLET ORAL DAILY
Status: CANCELLED | OUTPATIENT
Start: 2023-04-23

## 2023-04-22 RX ORDER — POLYETHYLENE GLYCOL 3350 17 G/17G
17 POWDER, FOR SOLUTION ORAL 2 TIMES DAILY PRN
Status: DISCONTINUED | OUTPATIENT
Start: 2023-04-22 | End: 2023-04-26 | Stop reason: HOSPADM

## 2023-04-22 RX ORDER — LORAZEPAM 0.5 MG/1
0.5 TABLET ORAL EVERY 6 HOURS PRN
Status: DISCONTINUED | OUTPATIENT
Start: 2023-04-22 | End: 2023-04-26 | Stop reason: HOSPADM

## 2023-04-22 RX ORDER — SERTRALINE HYDROCHLORIDE 50 MG/1
50 TABLET, FILM COATED ORAL NIGHTLY
Status: CANCELLED | OUTPATIENT
Start: 2023-04-22

## 2023-04-22 RX ORDER — AMIODARONE HYDROCHLORIDE 200 MG/1
200 TABLET ORAL 3 TIMES DAILY
Status: CANCELLED | OUTPATIENT
Start: 2023-04-22

## 2023-04-22 RX ORDER — PANTOPRAZOLE SODIUM 40 MG/1
40 TABLET, DELAYED RELEASE ORAL
Status: DISCONTINUED | OUTPATIENT
Start: 2023-04-23 | End: 2023-04-26 | Stop reason: HOSPADM

## 2023-04-22 RX ORDER — ONDANSETRON 2 MG/ML
4 INJECTION INTRAMUSCULAR; INTRAVENOUS EVERY 8 HOURS PRN
Status: CANCELLED | OUTPATIENT
Start: 2023-04-22

## 2023-04-22 RX ORDER — ENOXAPARIN SODIUM 100 MG/ML
40 INJECTION SUBCUTANEOUS EVERY 24 HOURS
Status: DISCONTINUED | OUTPATIENT
Start: 2023-04-22 | End: 2023-04-26 | Stop reason: HOSPADM

## 2023-04-22 RX ORDER — POTASSIUM CHLORIDE 750 MG/1
10 CAPSULE, EXTENDED RELEASE ORAL EVERY OTHER DAY
Status: CANCELLED | OUTPATIENT
Start: 2023-04-24

## 2023-04-22 RX ORDER — PANTOPRAZOLE SODIUM 40 MG/1
40 TABLET, DELAYED RELEASE ORAL
Status: CANCELLED | OUTPATIENT
Start: 2023-04-23

## 2023-04-22 RX ORDER — LANOLIN ALCOHOL/MO/W.PET/CERES
400 CREAM (GRAM) TOPICAL DAILY
Status: CANCELLED | OUTPATIENT
Start: 2023-04-23

## 2023-04-22 RX ORDER — LANOLIN ALCOHOL/MO/W.PET/CERES
800 CREAM (GRAM) TOPICAL
Status: CANCELLED | OUTPATIENT
Start: 2023-04-22

## 2023-04-22 RX ORDER — ACETAMINOPHEN 325 MG/1
650 TABLET ORAL EVERY 4 HOURS PRN
Status: DISCONTINUED | OUTPATIENT
Start: 2023-04-22 | End: 2023-04-26 | Stop reason: HOSPADM

## 2023-04-22 RX ORDER — BUTALBITAL, ACETAMINOPHEN AND CAFFEINE 50; 325; 40 MG/1; MG/1; MG/1
1 TABLET ORAL EVERY 4 HOURS PRN
Status: DISCONTINUED | OUTPATIENT
Start: 2023-04-22 | End: 2023-04-26 | Stop reason: HOSPADM

## 2023-04-22 RX ORDER — TALC
9 POWDER (GRAM) TOPICAL NIGHTLY PRN
Status: DISCONTINUED | OUTPATIENT
Start: 2023-04-22 | End: 2023-04-26 | Stop reason: HOSPADM

## 2023-04-22 RX ORDER — ACETAMINOPHEN 325 MG/1
650 TABLET ORAL EVERY 4 HOURS PRN
Status: CANCELLED | OUTPATIENT
Start: 2023-04-22

## 2023-04-22 RX ORDER — LIDOCAINE HYDROCHLORIDE ANHYDROUS AND DEXTROSE MONOHYDRATE .4; 5 G/100ML; G/100ML
1 INJECTION, SOLUTION INTRAVENOUS CONTINUOUS
Status: DISCONTINUED | OUTPATIENT
Start: 2023-04-22 | End: 2023-04-22

## 2023-04-22 RX ORDER — MAGNESIUM SULFATE 1 G/100ML
1 INJECTION INTRAVENOUS ONCE
Status: COMPLETED | OUTPATIENT
Start: 2023-04-22 | End: 2023-04-22

## 2023-04-22 RX ORDER — LANOLIN ALCOHOL/MO/W.PET/CERES
400 CREAM (GRAM) TOPICAL DAILY
Status: DISCONTINUED | OUTPATIENT
Start: 2023-04-23 | End: 2023-04-26 | Stop reason: HOSPADM

## 2023-04-22 RX ORDER — NITROGLYCERIN 0.4 MG/1
0.4 TABLET SUBLINGUAL EVERY 5 MIN PRN
Status: CANCELLED | OUTPATIENT
Start: 2023-04-22

## 2023-04-22 RX ORDER — ONDANSETRON 2 MG/ML
4 INJECTION INTRAMUSCULAR; INTRAVENOUS EVERY 8 HOURS PRN
Status: DISCONTINUED | OUTPATIENT
Start: 2023-04-22 | End: 2023-04-26 | Stop reason: HOSPADM

## 2023-04-22 RX ORDER — NITROGLYCERIN 0.4 MG/1
0.4 TABLET SUBLINGUAL EVERY 5 MIN PRN
Status: DISCONTINUED | OUTPATIENT
Start: 2023-04-22 | End: 2023-04-26 | Stop reason: HOSPADM

## 2023-04-22 RX ORDER — AMIODARONE HYDROCHLORIDE 200 MG/1
200 TABLET ORAL 3 TIMES DAILY
Status: DISCONTINUED | OUTPATIENT
Start: 2023-04-22 | End: 2023-04-22 | Stop reason: HOSPADM

## 2023-04-22 RX ORDER — SPIRONOLACTONE 25 MG/1
25 TABLET ORAL DAILY
Status: CANCELLED | OUTPATIENT
Start: 2023-04-23

## 2023-04-22 RX ORDER — LISINOPRIL 5 MG/1
5 TABLET ORAL DAILY
Status: DISCONTINUED | OUTPATIENT
Start: 2023-04-23 | End: 2023-04-26 | Stop reason: HOSPADM

## 2023-04-22 RX ORDER — NAPROXEN SODIUM 220 MG/1
81 TABLET, FILM COATED ORAL DAILY
Status: DISCONTINUED | OUTPATIENT
Start: 2023-04-23 | End: 2023-04-26 | Stop reason: HOSPADM

## 2023-04-22 RX ORDER — FUROSEMIDE 10 MG/ML
40 INJECTION INTRAMUSCULAR; INTRAVENOUS DAILY
Status: DISCONTINUED | OUTPATIENT
Start: 2023-04-23 | End: 2023-04-26

## 2023-04-22 RX ORDER — SERTRALINE HYDROCHLORIDE 50 MG/1
50 TABLET, FILM COATED ORAL NIGHTLY
Status: DISCONTINUED | OUTPATIENT
Start: 2023-04-22 | End: 2023-04-26 | Stop reason: HOSPADM

## 2023-04-22 RX ORDER — TALC
9 POWDER (GRAM) TOPICAL NIGHTLY PRN
Status: CANCELLED | OUTPATIENT
Start: 2023-04-22

## 2023-04-22 RX ORDER — BUTALBITAL, ACETAMINOPHEN AND CAFFEINE 50; 325; 40 MG/1; MG/1; MG/1
1 TABLET ORAL EVERY 4 HOURS PRN
Status: CANCELLED | OUTPATIENT
Start: 2023-04-22

## 2023-04-22 RX ORDER — LIDOCAINE HYDROCHLORIDE ANHYDROUS AND DEXTROSE MONOHYDRATE .4; 5 G/100ML; G/100ML
1 INJECTION, SOLUTION INTRAVENOUS CONTINUOUS
Status: CANCELLED | OUTPATIENT
Start: 2023-04-22

## 2023-04-22 RX ORDER — METOPROLOL SUCCINATE 25 MG/1
25 TABLET, EXTENDED RELEASE ORAL DAILY
Status: CANCELLED | OUTPATIENT
Start: 2023-04-23

## 2023-04-22 RX ORDER — SODIUM CHLORIDE 0.9 % (FLUSH) 0.9 %
2 SYRINGE (ML) INJECTION
Status: CANCELLED | OUTPATIENT
Start: 2023-04-22

## 2023-04-22 RX ORDER — NAPROXEN SODIUM 220 MG/1
81 TABLET, FILM COATED ORAL DAILY
Status: CANCELLED | OUTPATIENT
Start: 2023-04-23

## 2023-04-22 RX ORDER — ENOXAPARIN SODIUM 100 MG/ML
40 INJECTION SUBCUTANEOUS EVERY 24 HOURS
Status: CANCELLED | OUTPATIENT
Start: 2023-04-22

## 2023-04-22 RX ORDER — AMIODARONE HYDROCHLORIDE 200 MG/1
200 TABLET ORAL 3 TIMES DAILY
Status: DISCONTINUED | OUTPATIENT
Start: 2023-04-22 | End: 2023-04-22

## 2023-04-22 RX ORDER — LIDOCAINE HYDROCHLORIDE ANHYDROUS AND DEXTROSE MONOHYDRATE .4; 5 G/100ML; G/100ML
1 INJECTION, SOLUTION INTRAVENOUS CONTINUOUS
Status: DISCONTINUED | OUTPATIENT
Start: 2023-04-22 | End: 2023-04-22 | Stop reason: HOSPADM

## 2023-04-22 RX ORDER — LANOLIN ALCOHOL/MO/W.PET/CERES
800 CREAM (GRAM) TOPICAL
Status: DISCONTINUED | OUTPATIENT
Start: 2023-04-22 | End: 2023-04-26 | Stop reason: HOSPADM

## 2023-04-22 RX ORDER — SODIUM CHLORIDE 0.9 % (FLUSH) 0.9 %
2 SYRINGE (ML) INJECTION
Status: DISCONTINUED | OUTPATIENT
Start: 2023-04-22 | End: 2023-04-26 | Stop reason: HOSPADM

## 2023-04-22 RX ORDER — SPIRONOLACTONE 25 MG/1
25 TABLET ORAL DAILY
Status: DISCONTINUED | OUTPATIENT
Start: 2023-04-23 | End: 2023-04-26 | Stop reason: HOSPADM

## 2023-04-22 RX ORDER — FUROSEMIDE 10 MG/ML
40 INJECTION INTRAMUSCULAR; INTRAVENOUS DAILY
Status: CANCELLED | OUTPATIENT
Start: 2023-04-23

## 2023-04-22 RX ORDER — LORAZEPAM 0.5 MG/1
0.5 TABLET ORAL EVERY 6 HOURS PRN
Status: CANCELLED | OUTPATIENT
Start: 2023-04-22

## 2023-04-22 RX ORDER — PHENYLEPHRINE HCL IN 0.9% NACL 1 MG/10 ML
SYRINGE (ML) INTRAVENOUS
Status: COMPLETED
Start: 2023-04-22 | End: 2023-04-22

## 2023-04-22 RX ORDER — FUROSEMIDE 10 MG/ML
40 INJECTION INTRAMUSCULAR; INTRAVENOUS DAILY
Status: DISCONTINUED | OUTPATIENT
Start: 2023-04-23 | End: 2023-04-22 | Stop reason: HOSPADM

## 2023-04-22 RX ORDER — POLYETHYLENE GLYCOL 3350 17 G/17G
17 POWDER, FOR SOLUTION ORAL 2 TIMES DAILY PRN
Status: CANCELLED | OUTPATIENT
Start: 2023-04-22

## 2023-04-22 RX ADMIN — AMIODARONE HYDROCHLORIDE 150 MG: 1.5 INJECTION, SOLUTION INTRAVENOUS at 10:04

## 2023-04-22 RX ADMIN — LIDOCAINE HYDROCHLORIDE 1 MG/MIN: 8 INJECTION, SOLUTION INTRAVENOUS at 02:04

## 2023-04-22 RX ADMIN — FUROSEMIDE 40 MG: 10 INJECTION, SOLUTION INTRAVENOUS at 05:04

## 2023-04-22 RX ADMIN — METOPROLOL SUCCINATE 25 MG: 25 TABLET, EXTENDED RELEASE ORAL at 09:04

## 2023-04-22 RX ADMIN — MAGNESIUM SULFATE HEPTAHYDRATE 1 G: 1 INJECTION, SOLUTION INTRAVENOUS at 11:04

## 2023-04-22 RX ADMIN — LORAZEPAM 0.5 MG: 0.5 TABLET ORAL at 09:04

## 2023-04-22 RX ADMIN — AMIODARONE HYDROCHLORIDE 0.5 MG/MIN: 1.8 INJECTION, SOLUTION INTRAVENOUS at 09:04

## 2023-04-22 RX ADMIN — AMIODARONE HYDROCHLORIDE 1 MG/MIN: 1.8 INJECTION, SOLUTION INTRAVENOUS at 03:04

## 2023-04-22 RX ADMIN — Medication 400 MG: at 09:04

## 2023-04-22 RX ADMIN — SPIRONOLACTONE 25 MG: 25 TABLET ORAL at 09:04

## 2023-04-22 RX ADMIN — AMIODARONE HYDROCHLORIDE 150 MG: 1.5 INJECTION, SOLUTION INTRAVENOUS at 03:04

## 2023-04-22 RX ADMIN — SERTRALINE HYDROCHLORIDE 50 MG: 50 TABLET ORAL at 09:04

## 2023-04-22 RX ADMIN — ONDANSETRON 4 MG: 2 INJECTION INTRAMUSCULAR; INTRAVENOUS at 11:04

## 2023-04-22 RX ADMIN — ASPIRIN 81 MG 81 MG: 81 TABLET ORAL at 09:04

## 2023-04-22 RX ADMIN — ENOXAPARIN SODIUM 40 MG: 40 INJECTION SUBCUTANEOUS at 05:04

## 2023-04-22 RX ADMIN — ACETAMINOPHEN 650 MG: 325 TABLET ORAL at 07:04

## 2023-04-22 RX ADMIN — ACETAMINOPHEN 650 MG: 325 TABLET ORAL at 05:04

## 2023-04-22 RX ADMIN — LORAZEPAM 0.5 MG: 0.5 TABLET ORAL at 07:04

## 2023-04-22 RX ADMIN — AMIODARONE HYDROCHLORIDE 200 MG: 200 TABLET ORAL at 02:04

## 2023-04-22 RX ADMIN — AMIODARONE HYDROCHLORIDE 200 MG: 200 TABLET ORAL at 09:04

## 2023-04-22 RX ADMIN — POTASSIUM BICARBONATE 50 MEQ: 977.5 TABLET, EFFERVESCENT ORAL at 11:04

## 2023-04-22 RX ADMIN — POTASSIUM CHLORIDE 10 MEQ: 750 CAPSULE, EXTENDED RELEASE ORAL at 09:04

## 2023-04-22 RX ADMIN — LIDOCAINE HYDROCHLORIDE ANHYDROUS AND DEXTROSE MONOHYDRATE 1 MG/MIN: .4; 5 INJECTION, SOLUTION INTRAVENOUS at 12:04

## 2023-04-22 NOTE — ASSESSMENT & PLAN NOTE
67 y.o. male with CAD s/p stent (though patient does not recall), HFrEF (35%) s/p AICD, hyperlipidemia, hypertension, tobacco use who presents for nonsustained VT.  Stress test with  large fixed hypokinetic defect in apex and anterior wall.  Started on amio drip, converted to amio 200 mg TID, however patient continued to have intermittent VT so started on lidocaine drip.  On arrival, patient in VT with HR 130s, spontaneously converted to NSR.  HDS with BP 100s systolic throughout.     - continue amiodarone, restart on drip given refractory intermittent Vt  - continue lidocaine drip  - continuous tele  - will consider intubation/cessation for VT termination if refractory  - EP consulted

## 2023-04-22 NOTE — PROGRESS NOTES
[facility]  Department of Cardiology  Consult Note      PATIENT NAME: Brandon Rivas  MRN: [id number]  TODAY'S DATE: 04/22/2023  ADMIT DATE: 4/16/2023                          CONSULT REQUESTED BY: Jesús Heller MD    SUBJECTIVE     PRINCIPAL PROBLEM: <principal problem not specified>      REASON FOR CONSULT:   Ventricular Tachycardia    04/22/2023:  Recurrent sustained monomorphic VT today. hemodynamically stable, slow VT in 130s.       INTERVAL HISTORY  04/21/2023  Patient is resting comfortably in bed during examination.  No acute distress.  Vital signs stable.  Patient is in sinus rhythm sinus Perez overnight.  Hemodynamically stable.  No events overnight.  No further ventricular tachycardia.  Chest x-ray shows minimal improvement and BNP is worsened.  Patient denies shortness of breath at this time but becomes winded with prolonged conversation during exam.    4/20/23  Patient is resting comfortably.  NAD.  VSS. A&O x 4.  Hemodynamically stable.  No VT overnight.  Patient was transitioned to oral amiodarone today and taken off of amiodarone drip.-1.6 L yesterday and-1.1 L today.  Patient states his breathing is improving.  He denies shortness of breath and dizziness.      04/19/2023    Patient is resting comfortably in bed during examination.  No acute distress.  Chest x-ray shows interstitial pulmonary edema at the lung bases.  Patient has been refusing to take his lisinopril and diuretics today.  However patient is agreeable to starting his lisinopril and IV Lasix this afternoon.  Discussed importance of medication compliance with patient.  Patient had multiple episodes of nonsustained VT overnight.  Patient is started back on amiodarone drip.    4/18/23  Patient is exam during nuclear stress test patient complained of dizziness, chest discomfort and shortness of breath during stress portion of test.  Symptoms resolved quickly.  Per RN patient has been refusing his p.o. medications, Lasix,  spironolactone, and lisinopril.  No acute changes noted on EKG.  Patient was sinus rhythm overnight with no events on telemetry.  No further VT.      4/17/23    Patient was discharged yesterday afternoon at approximately 1:00 a.m. later re-presented to the ED with complaints of persistent shortness of breath, palpitations, impending doom and he was found to be in nonsustained VT.  Symptoms lasted approximately 5-10 minutes.  Patient had VT overnight as well.  Patient has refused amiodarone drip this morning but did receive amiodarone at 1 milligram/hour overnight.  Patient states amiodarone gives him dizziness and feelings of shortness of breath.  No acute distress during examination.  Sinus rhythm on the monitor.  98% on room air.  Patient appears anxious.    4/16/23    Patient is resting comfortably in bed during examination, sinus rhythm on the monitor heart rate 56.  Blood pressure 118/67.  2 L nasal cannula patient was still on amiodarone drip at 0.5 milligrams/hour.  No acute complaints at this time.  Mild dizziness.  No events on telemetry.      HPI:    Patient is a 67-year-old male with CAD s/p stent, hyperlipidemia, hypertension, chronic combined CHF s/p AICD who presented to the ED with chest pain and palpitations.  AICD fired last week with increased palpitations and chest discomfort since.  Heart rate at home was 180.  Patient was found to be in VT versus SVT and was treated with 5 mg of IV metoprolol, 2 g magnesium, and 150 mg amiodarone push.  Amiodarone infusion was initiated.  Patient is anxious during examination.  He was stress about amiodarone as he is concerned it will cause lung problems.  Questionable compliance with home medications.    During examination he was anxious, in sinus rhythm, and on amiodarone drip.  No edema.  No acute distress.            FROM H&P   Chest Pain       Chest pain x 1 day. States heart rate was in 180's.  Took 4 ntg at home.          HPI: 67-year-old man with CAD  "status post stenting, hyperlipidemia, hypertension, chronic combined CHF status post AICD, tobacco use, presenting with chest pain and palpitations.  Patient reports last week AICD firing.  He has had intermittent palpitations with chest pain since, and they worsened today.  He measured his heart rate at 180 at home.   He presented to the ED where EKG showed V-tach versus SVT.  He was treated with 5 mg IV metoprolol, 2 g magnesium, and 150 mg amiodarone push.  Cardiology was contacted and he was started on amiodarone infusion.  He has gone in and out of wide complex tachycardia while in the ED with stable blood pressure and mentation.    Patient reports he has been very depressed and stressed about his medical problems and this tends to make his  heart rate increase.  He takes amiodarone but only once daily instead of twice because he is concerned about lung effects.  He is noncompliant with Lasix because it "tears up his insides".  He reports compliance with his metoprolol.  He continues to smoke cigars a few puffs a day.  He no longer uses alcohol.  He was recently treated for a UTI which he states is resolved and he had a urinalysis last week showing clearance.  He denies current chest pain, fever, chills, cough, dizziness, syncope, shortness of breath, vision changes.         Review of patient's allergies indicates:   Allergen Reactions    Bactrim [sulfamethoxazole-trimethoprim] Shortness Of Breath    Statins-hmg-coa reductase inhibitors Other (See Comments)     Statin intolerance. Muscle weakness    Wellbutrin [bupropion hcl] Other (See Comments)     Abdominal pain        Past Medical History:   Diagnosis Date    Coronary artery disease     Hypertension     MI (myocardial infarction)     Thyroid disease      Past Surgical History:   Procedure Laterality Date    CARDIAC PACEMAKER PLACEMENT       Social History     Tobacco Use    Smoking status: Every Day     Packs/day: 1.00     Years: 40.00     Pack years: 40.00 "     Types: Cigarettes    Smokeless tobacco: Never   Substance Use Topics    Alcohol use: Yes     Comment: 12 pack beer daily    Drug use: No        REVIEW OF SYSTEMS  As per mentioned in HPI    OBJECTIVE     VITAL SIGNS (Most Recent)  Temp: 97.9 °F (36.6 °C) (04/22/23 1137)  Pulse: (!) 130 (04/22/23 1204)  Resp: 20 (04/22/23 1137)  BP: 100/70 (04/22/23 1204)  SpO2: 95 % (04/22/23 1137)    VENTILATION STATUS  Resp: 20 (04/22/23 1137)  SpO2: 95 % (04/22/23 1137)           I & O (Last 24H):  Intake/Output Summary (Last 24 hours) at 4/22/2023 1247  Last data filed at 4/22/2023 0445  Gross per 24 hour   Intake 480 ml   Output 300 ml   Net 180 ml       WEIGHTS  Wt Readings from Last 3 Encounters:   04/22/23 0422 92.3 kg (203 lb 9 oz)   04/21/23 0410 94.8 kg (209 lb 1 oz)   04/20/23 1107 93.4 kg (205 lb 14.6 oz)   04/20/23 0300 95.4 kg (210 lb 6.4 oz)   04/19/23 0411 97.2 kg (214 lb 3.2 oz)   04/18/23 1900 96.9 kg (213 lb 10 oz)   04/16/23 1900 99 kg (218 lb 4.1 oz)   04/16/23 1332 95.3 kg (210 lb)   04/22/23 1219 92.1 kg (203 lb)   04/15/23 0530 97 kg (213 lb 13.5 oz)   04/15/23 0114 95.3 kg (210 lb)       PHYSICAL EXAM  GENERAL: well built elderly male in no apparent distress alert and oriented.   HEENT: Normocephalic. Pupils normal and conjunctivae normal.   NECK: No JVD. No bruit..   THYROID: Thyroid not examined  CARDIAC: Regular rate and rhythm. Tachy S1 is normal.S2 is normal.  2/6 systolic murmur  CHEST ANATOMY: normal.   LUNGS:  CTA d/l   ABDOMEN: Soft Normal bowel sounds. Nontender  URINARY: No farrar catheter   EXTREMITIES: No cyanosis, clubbing or edema noted at this time.  CENTRAL NERVOUS SYSTEM:  AAO x3   MUSCLE STRENGTH & TONE: No noteable weakness, atrophy or abnormal movement.     HOME MEDICATIONS:  No current facility-administered medications on file prior to encounter.     Current Outpatient Medications on File Prior to Encounter   Medication Sig Dispense Refill    metoprolol succinate (TOPROL-XL) 50 MG  24 hr tablet Take 1 tablet (50 mg total) by mouth once daily. 90 tablet 3    amiodarone (PACERONE) 200 MG Tab Take 1 tablet (200 mg total) by mouth 3 (three) times daily. 90 tablet 11    aspirin 81 MG Chew Take 1 tablet (81 mg total) by mouth once daily.  0    butalbital-acetaminophen-caffeine -40 mg (FIORICET, ESGIC) -40 mg per tablet Take 1 tablet by mouth every 4 (four) hours as needed for Headaches. 20 tablet 0    furosemide (LASIX) 40 MG tablet Take 1 tablet (40 mg total) by mouth once daily. 90 tablet 3    lisinopriL (PRINIVIL,ZESTRIL) 20 MG tablet Take 1 tablet (20 mg total) by mouth once daily. 90 tablet 3    LORazepam (ATIVAN) 0.5 MG tablet Take 1 tablet (0.5 mg total) by mouth every 8 (eight) hours as needed for Anxiety. 30 tablet 0    nitroGLYCERIN (NITROSTAT) 0.4 MG SL tablet Place 1 tablet (0.4 mg total) under the tongue every 5 (five) minutes as needed for Chest pain. 25 tablet 6    pantoprazole (PROTONIX) 40 MG tablet Take 40 mg by mouth once daily.      potassium chloride (KLOR-CON) 10 MEQ TbSR Take 1 tablet (10 mEq total) by mouth every other day. 45 tablet 3    spironolactone (ALDACTONE) 25 MG tablet Take 1 tablet (25 mg total) by mouth once daily. 90 tablet 3    tamsulosin (FLOMAX) 0.4 mg Cap Take 1 capsule by mouth once daily.         SCHEDULED MEDS:   aspirin  81 mg Oral Daily    enoxaparin  40 mg Subcutaneous Daily    [START ON 4/23/2023] furosemide (LASIX) injection  40 mg Intravenous Daily    lisinopriL  5 mg Oral Daily    magnesium oxide  400 mg Oral Daily    magnesium sulfate IVPB  1 g Intravenous Once    metoprolol succinate  25 mg Oral Daily    pantoprazole  40 mg Oral Before breakfast    potassium chloride  10 mEq Oral Every other day    sertraline  50 mg Oral QHS    spironolactone  25 mg Oral Daily       CONTINUOUS INFUSIONS:   LIDOcaine             PRN MEDS:acetaminophen, butalbital-acetaminophen-caffeine -40 mg, LORazepam, magnesium oxide, magnesium oxide, melatonin,  nitroGLYCERIN, ondansetron, polyethylene glycol, potassium bicarbonate, potassium bicarbonate, potassium bicarbonate, sodium chloride 0.9%    LABS AND DIAGNOSTICS     CBC LAST 3 DAYS  Recent Labs   Lab 04/20/23  0433 04/21/23  0423 04/22/23  0518   WBC 6.56 6.75 8.48   RBC 4.21* 4.08* 4.37*   HGB 12.9* 12.5* 13.2*   HCT 40.1 37.8* 40.9   MCV 95 93 94   MCH 30.6 30.6 30.2   MCHC 32.2 33.1 32.3   RDW 15.1* 15.1* 14.8*    178 186   MPV 11.0 11.0 11.5   GRAN 65.6  4.3 66.4  4.5 66.5  5.6   LYMPH 17.7*  1.2 17.3*  1.2 15.7*  1.3   MONO 10.1  0.7 10.8  0.7 13.7  1.2*   BASO 0.05 0.06 0.06   NRBC 0 0 0       COAGULATION LAST 3 DAYS  Recent Labs   Lab 04/18/23  0527   INR 1.0   APTT 30.2       CHEMISTRY LAST 3 DAYS  Recent Labs   Lab 04/20/23 0432 04/21/23  0423 04/22/23  0518    136 136   K 4.1 3.9 3.7    106 104   CO2 24 25 26   ANIONGAP 10 5* 6*   BUN 14 17 15   CREATININE 0.7 0.8 0.9   GLU 92 95 93   CALCIUM 9.2 8.8 9.0   MG 1.9 2.0 2.0   ALBUMIN 3.8 3.8 3.9   PROT 7.4 7.1 7.6   ALKPHOS 69 74 79   ALT 20 17 15   AST 19 14 14   BILITOT 0.6 0.9 1.0       CARDIAC PROFILE LAST 3 DAYS  Recent Labs   Lab 04/16/23  1450 04/16/23  1631 04/21/23  0423   *  --  588*   TROPONINIHS 11.3 11.6  --        ENDOCRINE LAST 3 DAYS  Recent Labs   Lab 04/16/23  1450   TSH 8.440*       LAST ARTERIAL BLOOD GAS  ABG  No results for input(s): PH, PO2, PCO2, HCO3, BE in the last 168 hours.    LAST 7 DAYS MICROBIOLOGY   Microbiology Results (last 7 days)       ** No results found for the last 168 hours. **            MOST RECENT IMAGING  X-Ray Chest 1 View  CLINICAL HISTORY:  67 years (1955) Male chf chf, Chronic combined systolic and diastolic heart failure    TECHNIQUE:  Portable AP radiograph the chest.    COMPARISON:  Radiograph from April 21, 2023.    FINDINGS:  The lungs are clear. Costophrenic angles are seen without effusion. No pneumothorax is identified. The heart is top normal in size. The  median sternotomy wires and the left sided AICD pacemaker are unchanged. The aorta appears tortuous, a finding usually associated with either atherosclerosis or systemic hypertension.  Osseous structures appear within normal limits. The visualized upper abdomen is unremarkable.    IMPRESSION:  No acute cardiac or pulmonary process.    .    Electronically signed by:  Moe Huang MD  4/22/2023 8:47 AM CDT Workstation: [id number]      ECHOCARDIOGRAM RESULTS (last 5)  Results for orders placed during the hospital encounter of 02/21/23    Echo    Interpretation Summary  · The left ventricle is mildly enlarged with moderate concentric hypertrophy evidence of old apical scar seen on apical four-chamber view  · The estimated ejection fraction is 34%.  · Grade III left ventricular diastolic dysfunction. Mean left atrial pressure 11  · There are segmental left ventricular wall motion abnormalities.  · Atrial fibrillation not observed.  · Normal right ventricular size with mildly reduced right ventricular systolic function.  · Mild-to-moderate aortic regurgitation.  · Severe mitral regurgitation.  · Intermediate central venous pressure (8 mmHg).  · The estimated PA systolic pressure is 30 mmHg. Mild pulmonary hypertension is present    Patient has sinus rhythm      CURRENT/PREVIOUS VISIT EKG  Results for orders placed or performed during the hospital encounter of 04/16/23   EKG 12-lead    Collection Time: 04/22/23  9:16 AM    Narrative    Test Reason : I47.20,R94.31,    Vent. Rate : 138 BPM     Atrial Rate : 000 BPM     P-R Int : 000 ms          QRS Dur : 168 ms      QT Int : 434 ms       P-R-T Axes : 000 267 076 degrees     QTc Int : 657 ms     Suspect arm lead reversal, interpretation assumes no reversal  Wide QRS tachycardia  Right bundle branch block  Anterolateral infarct ,age undetermined  Abnormal ECG  When compared with ECG of 19-APR-2023 00:18,  Wide QRS tachycardia has replaced Sinus rhythm  Vent. rate has  increased BY  64 BPM    Referred By: [email or web address]   SELF           Confirmed By:            ASSESSMENT/PLAN:     There are no active hospital problems to display for this patient.      ASSESSMENT & PLAN:   -Recurrent monomorphic sustained VT:  Possibly scar mediated. Hemodynamically stable, slow VT in 130s  -Ventricular tachycardia s/p ICD discharge  -HFrEF s/p ICD  -CAD s/p CABG  -Severe MR, re-eval once on GDMT    -continue amiodarone p.o.  -start IV lidocaine infusion  -continue beta-blocker  -nuclear stress test -no ischemia.  Anterior and apical scar  -advised transfer to [facility] for EP evaluation and possible ablation.  Patient was approved for transfer.   -GDMT for HFrEF.  Can switch Lasix to p.o..   -keep potassium > 4 magnesium >2      Blaire Hanley MD  [facility]  Department of Cardiology  Date of Service: 04/22/2023

## 2023-04-22 NOTE — SUBJECTIVE & OBJECTIVE
Past Medical History:   Diagnosis Date    Coronary artery disease     Hypertension     MI (myocardial infarction)     Thyroid disease        Past Surgical History:   Procedure Laterality Date    CARDIAC PACEMAKER PLACEMENT         Review of patient's allergies indicates:   Allergen Reactions    Bactrim [sulfamethoxazole-trimethoprim] Shortness Of Breath    Statins-hmg-coa reductase inhibitors Other (See Comments)     Statin intolerance. Muscle weakness    Wellbutrin [bupropion hcl] Other (See Comments)     Abdominal pain        Current Facility-Administered Medications on File Prior to Encounter   Medication    [] chlorhexidine 0.12 % solution 15 mL    [COMPLETED] magnesium sulfate in dextrose IVPB (premix) 1 g    [] mupirocin 2 % ointment    [DISCONTINUED] acetaminophen tablet 650 mg    [DISCONTINUED] amiodarone 360 mg/200 mL (1.8 mg/mL) infusion    [DISCONTINUED] amiodarone tablet 200 mg    [DISCONTINUED] amiodarone tablet 200 mg    [DISCONTINUED] aspirin chewable tablet 81 mg    [DISCONTINUED] butalbital-acetaminophen-caffeine -40 mg per tablet 1 tablet    [DISCONTINUED] enoxaparin injection 40 mg    [DISCONTINUED] furosemide injection 40 mg    [DISCONTINUED] furosemide injection 40 mg    [DISCONTINUED] LIDOcaine 4 mg/mL in dextrose 5% infusion    [DISCONTINUED] lisinopriL tablet 5 mg    [DISCONTINUED] LORazepam tablet 0.5 mg    [DISCONTINUED] magnesium oxide tablet 400 mg    [DISCONTINUED] magnesium oxide tablet 800 mg    [DISCONTINUED] magnesium oxide tablet 800 mg    [DISCONTINUED] melatonin tablet 9 mg    [DISCONTINUED] metoprolol succinate (TOPROL-XL) 24 hr tablet 25 mg    [DISCONTINUED] nitroGLYCERIN SL tablet 0.4 mg    [DISCONTINUED] ondansetron injection 4 mg    [DISCONTINUED] pantoprazole EC tablet 40 mg    [DISCONTINUED] polyethylene glycol packet 17 g    [DISCONTINUED] potassium bicarbonate disintegrating tablet 35 mEq    [DISCONTINUED] potassium bicarbonate disintegrating tablet 50  mEq    [DISCONTINUED] potassium bicarbonate disintegrating tablet 60 mEq    [DISCONTINUED] potassium chloride CR capsule 10 mEq    [DISCONTINUED] sertraline tablet 50 mg    [DISCONTINUED] sodium chloride 0.9% flush 2 mL    [DISCONTINUED] spironolactone tablet 25 mg     Current Outpatient Medications on File Prior to Encounter   Medication Sig    amiodarone (PACERONE) 200 MG Tab Take 1 tablet (200 mg total) by mouth 3 (three) times daily.    aspirin 81 MG Chew Take 1 tablet (81 mg total) by mouth once daily.    butalbital-acetaminophen-caffeine -40 mg (FIORICET, ESGIC) -40 mg per tablet Take 1 tablet by mouth every 4 (four) hours as needed for Headaches.    furosemide (LASIX) 40 MG tablet Take 1 tablet (40 mg total) by mouth once daily.    lisinopriL (PRINIVIL,ZESTRIL) 20 MG tablet Take 1 tablet (20 mg total) by mouth once daily.    LORazepam (ATIVAN) 0.5 MG tablet Take 1 tablet (0.5 mg total) by mouth every 8 (eight) hours as needed for Anxiety.    metoprolol succinate (TOPROL-XL) 50 MG 24 hr tablet Take 1 tablet (50 mg total) by mouth once daily.    nitroGLYCERIN (NITROSTAT) 0.4 MG SL tablet Place 1 tablet (0.4 mg total) under the tongue every 5 (five) minutes as needed for Chest pain.    pantoprazole (PROTONIX) 40 MG tablet Take 40 mg by mouth once daily.    potassium chloride (KLOR-CON) 10 MEQ TbSR Take 1 tablet (10 mEq total) by mouth every other day.    spironolactone (ALDACTONE) 25 MG tablet Take 1 tablet (25 mg total) by mouth once daily.    tamsulosin (FLOMAX) 0.4 mg Cap Take 1 capsule by mouth once daily.     Family History       Problem Relation (Age of Onset)    Fibromyalgia Mother    Heart disease Father    Hyperlipidemia Father          Tobacco Use    Smoking status: Every Day     Packs/day: 1.00     Years: 40.00     Pack years: 40.00     Types: Cigarettes    Smokeless tobacco: Never   Substance and Sexual Activity    Alcohol use: Yes     Comment: 12 pack beer daily    Drug use: No    Sexual  activity: Not on file     Review of Systems   Constitutional: Positive for malaise/fatigue. Negative for decreased appetite, weight gain and weight loss.   Cardiovascular:  Positive for irregular heartbeat and palpitations. Negative for orthopnea and paroxysmal nocturnal dyspnea.   Respiratory:  Positive for shortness of breath. Negative for cough.    Gastrointestinal:  Negative for bloating, diarrhea, nausea and vomiting.   Genitourinary:  Negative for dysuria and hematuria.   Neurological:  Positive for weakness. Negative for focal weakness.   Objective:     Vital Signs (Most Recent):  Temp: 98 °F (36.7 °C) (04/22/23 1419)  Pulse: (!) 135 (04/22/23 1419)  BP: 109/72 (04/22/23 1419)  SpO2: 96 % (04/22/23 1419)   Vital Signs (24h Range):  Temp:  [97.5 °F (36.4 °C)-98 °F (36.7 °C)] 98 °F (36.7 °C)  Pulse:  [] 135  Resp:  [18-20] 20  SpO2:  [95 %-96 %] 96 %  BP: ()/(53-72) 109/72     Weight: 92.1 kg (203 lb 0.7 oz)  Body mass index is 26.79 kg/m².    SpO2: 96 %       No intake or output data in the 24 hours ending 04/22/23 1433    Lines/Drains/Airways       Peripheral Intravenous Line  Duration                  Peripheral IV - Single Lumen 04/20/23 2200 20 G Distal;Left;Posterior Forearm 1 day         Peripheral IV - Single Lumen 04/22/23 1245 20 G Left Forearm <1 day                    Physical Exam  Constitutional:       General: He is not in acute distress.     Appearance: Normal appearance.   HENT:      Mouth/Throat:      Comments: Poor dentition  Eyes:      Extraocular Movements: Extraocular movements intact.      Pupils: Pupils are equal, round, and reactive to light.   Cardiovascular:      Rate and Rhythm: Normal rate and regular rhythm.      Pulses: Normal pulses.      Heart sounds: Normal heart sounds.   Pulmonary:      Effort: Pulmonary effort is normal.      Breath sounds: Normal breath sounds.   Abdominal:      General: Abdomen is flat. Bowel sounds are normal. There is no distension.       Palpations: Abdomen is soft.      Tenderness: There is no abdominal tenderness. There is no guarding.      Hernia: No hernia is present.   Musculoskeletal:      Right lower leg: No edema.      Left lower leg: No edema.   Skin:     Capillary Refill: Capillary refill takes less than 2 seconds.      Findings: No lesion.   Neurological:      General: No focal deficit present.      Mental Status: He is alert and oriented to person, place, and time.   Psychiatric:         Mood and Affect: Mood is anxious.       Significant Labs: CMP   Recent Labs   Lab 04/21/23  0423 04/22/23  0518    136   K 3.9 3.7    104   CO2 25 26   GLU 95 93   BUN 17 15   CREATININE 0.8 0.9   CALCIUM 8.8 9.0   PROT 7.1 7.6   ALBUMIN 3.8 3.9   BILITOT 0.9 1.0   ALKPHOS 74 79   AST 14 14   ALT 17 15   ANIONGAP 5* 6*    and CBC   Recent Labs   Lab 04/21/23  0423 04/22/23  0518   WBC 6.75 8.48   HGB 12.5* 13.2*   HCT 37.8* 40.9    186       Significant Imaging:  reviewed

## 2023-04-22 NOTE — Clinical Note
Nutrition Assessment    Type and Reason for Visit: Initial, Positive Nutrition Screen, Consult(Trigger: wound, decreased appetite; Consult: Emerson Score)    Nutrition Recommendations: Modify current diet, Start ONS(Carb Control 4 Diet for improved blood glucose control to promote improved wound healing. Start wound healing ONS BID)    Nutrition Assessment: Pt compromised from a nutrition standpoint as evidenced by the presence of wounds. At risk for further decline due to increased nutrient demands for healing and decreased appetite and po intake since admission. Will start wound healing ONS and modify diet to promote improved blood glucose control and wound healing. Malnutrition Assessment:  · Malnutrition Status: At risk for malnutrition  · Context: Chronic illness  · Findings of the 6 clinical characteristics of malnutrition (Minimum of 2 out of 6 clinical characteristics is required to make the diagnosis of moderate or severe Protein Calorie Malnutrition based on AND/ASPEN Guidelines):  1. Energy Intake-Greater than 75% of estimated energy requirement, Greater than or equal to 3 months    2. Weight Loss-No significant weight loss,    3. Fat Loss-No significant subcutaneous fat loss,    4. Muscle Loss-No significant muscle mass loss,    5. Fluid Accumulation-Mild fluid accumulation, Extremities  6.  Strength-Not measured    Nutrition Risk Level: Moderate    Nutrient Needs:  · Estimated Daily Total Kcal: 2471-5687 (15-18 kcals/kg)  · Estimated Daily Protein (g): 63-72 (1.3-1.5 g/kg)  · Estimated Daily Total Fluid (ml/day): 1600 mL (1 mL/kcal)    Nutrition Diagnosis:   · Problem: Increased nutrient needs  · Etiology: related to Increased demand for energy/nutrients     Signs and symptoms:  as evidenced by Presence of wounds    Objective Information:  · Nutrition-Focused Physical Findings: BM 6/7 (Colace). 0.9% NaCl @75 mL/hr. +1 BLE edema per nsg. R BKA. · Wound Type: (Infected stump;  Arterial deep The grounding pads were placed on the patient's bilateral thighs. tissue ulcer)  · Current Nutrition Therapies:  · Oral Diet Orders: General   · Oral Diet intake: 26-50%  · Oral Nutrition Supplement (ONS) Orders: None  · Anthropometric Measures:  · Ht: 5' 2\" (157.5 cm)   · Current Body Wt: 195 lb (88.5 kg)(6/8 Bed scale)  · Admission Body Wt: 200 lb (90.7 kg)(6/7 Stated)  · Usual Body Wt: 206 lb (93.4 kg)(Stated per pt; 205 lb. 11/9/18 @[facility])  · % Weight Change:  ,  None per pt's stated usual weight  · Ideal Body Wt: 106 lb 7.7 oz (48.3 kg), % Ideal Body >100%  · Adjusted Body Wt:  , body weight adjusted for BKA  · BMI Classification: BMI 35.0 - 39.9 Obese Class II(38.6 with BKA adjustment)    Nutrition Interventions:   Modify current diet, Start ONS(Carb Control 4 Diet for improved blood glucose control to promote improved wound healing. Start wound healing ONS BID)  Continued Inpatient Monitoring, Education Not Indicated    Nutrition Evaluation:   · Evaluation: Goals set   · Goals: PO intake >75% meals/ONS. Improved wound status.     · Monitoring: Meal Intake, Supplement Intake, Weight, Pertinent Labs, Wound Healing      Electronically signed by Merlinda Childs, RD, LD on 6/8/19 at 1:44 PM

## 2023-04-22 NOTE — PROGRESS NOTES
"[facility]  Progress Note    Patient Name: Brandon Rivas  MRN: [id number]  Patient Class: IP- Inpatient  Admission Date: 4/16/2023  Attending Physician: Jair Leon MD  Primary Care Provider: James Garcia MD  DOS: 04/21/2023    Subjective:     Chief Complaint:   Chief Complaint   Patient presents with    Shortness of Breath     Pt is complaining of feeling like his heart was racing and he is SOB. Started 15 minutes ago        HPI:   67-year-old man with CAD status post stenting, hyperlipidemia, hypertension, chronic combined CHF status post AICD, tobacco use, presenting with chest pain and palpitations.  Patient reports last week AICD firing.  He has had intermittent palpitations with chest pain since, and they worsened today.  He measured his heart rate at 180 at home.   He presented to the ED where EKG showed V-tach versus SVT.  He was treated with 5 mg IV metoprolol, 2 g magnesium, and 150 mg amiodarone push.  Cardiology was contacted and he was started on amiodarone infusion.  He has gone in and out of wide complex tachycardia while in the ED with stable blood pressure and mentation.    Patient reports he has been very depressed and stressed about his medical problems and this tends to make his  heart rate increase.  He takes amiodarone but only once daily instead of twice because he is concerned about lung effects.  He is noncompliant with Lasix because it "tears up his insides".  He reports compliance with his metoprolol.  He continues to smoke cigars a few puffs a day.  He no longer uses alcohol.  He was recently treated for a UTI which he states is resolved and he had a urinalysis last week showing clearance.  He denies current chest pain, fever, chills, cough, dizziness, syncope, shortness of breath, vision changes.    The patient was given IV amiodarone infusion for ventricular tachycardia which improved without recurrence.  The patient had " interrogation of AICD which did not reveal any arrhythmias since last interrogation.  The patient's symptoms improved and clinically the patient improved.  The patient was transitioned to oral amiodarone although the patient was very resistant to taking amiodarone 3 times daily.  He agreed to take amiodarone and subsequently was discharged home.  During hospitalization he was intermittently refusing medications.  Psychiatry consultation was ordered but patient was discharged prior to being seen by Psychiatry.  After about 1 hour the patient return to the emergency room with palpitations.  He was found to have ventricular tachycardia.  Cardiology consultation was obtained.  He was started on IV amiodarone infusion and re- admitted to the hospital.    4/17:  Patient seen and examined.  No chest pain.  No shortness of breath.  Afebrile.  Blood pressure stable.  The patient has been intermittently refusing IV amiodarone infusion today.  Cardiology to relatively planning angiogram tomorrow.    Interval history:  4/18: Patient seen and examined.  Patient reports persistent intermittent palpitations.  No chest pain or shortness of breath.  He has persistent generalized weakness.  He has been tolerating IV amiodarone infusion today without significant adverse effect.  Patient tolerated stress test well today.  He is very anxious about recurrent episodes of V-tach.  He knows that his anxiety is driving his symptoms.  He met with Psychiatry but is resistant to starting SSRI.    4/19: Patient seen and examined.  Patient reports palpitations better today with medical treatment, currently resolved.  No chest pain or shortness of breath.  Generalized weakness minimal.  Patient placed back on IV amiodarone overnight due to ventricular tachycardia episode.  Patient discussed plan with Cardiology today and agreeable to comply with medications including Lasix and amiodarone.  Patient met with Psychiatry and is now agreeable to  starting SSRI was Zoloft.  Tolerating diet without nausea or vomiting.  No headache or syncope.    4/20: Patient seen and examined. On cell phone talking comfortably.  No acute issues    4/21: Patient seen and examined.  Patient denies any chest pain.  Minimal shortness of breath today.  No nausea or vomiting.  Tolerating diet.  Mobilizing okay.  Patient remains anxious.    Review of Systems:  2 point review of systems reviewed and negative except as per interval history above    Vitals:    04/20/23 0753 04/20/23 1055 04/20/23 1107 04/20/23 1130   BP: 126/81   116/65   BP Location: Right arm   Right arm   Patient Position: Lying   Lying   Pulse: (!) 52 (!) 55  (!) 53   Resp: 18 18  18   Temp: 97.2 °F (36.2 °C)   97.8 °F (36.6 °C)   TempSrc: Oral   Oral   SpO2: 95% 95%  97%   Weight:   93.4 kg (205 lb 14.6 oz)    Height:             Physical exam   GENERAL:  Nontoxic nondiaphoretic, appears more comfortable today  HEENT:  Moist mucous membranes, poor dentition  EYES:  No conjunctival discharge or scleral icterus  LUNGS:  Comfortable work of breathing, lungs are clear bilaterally  CARDIAC:  2+ radial pulses, regular rate and rhythm, AICD left chest  NEUROLOGIC:  Awake, alert and oriented, fluent speech, follows commands appropriately  PSYCH:  Mood is anxious, insight fair, affect normal    LABS:  Creatinine 0.8    Chest x-ray 4/19:  Interstitial pulmonary edema    Nuclear stress testing negative for reversible ischemia    Assessment/Plan:     Recurrent V-tach  Still having intermittent episodes.  Admit to ICU.  Continue  Amiodarone drip, Cardiology consult  Possibly triggered by medication noncompliance and stress      Moderate episode of recurrent major depressive disorder  Discussed initiating a low-dose SSRI with him at length.  He is very hesitant about medications, will consider it  Continue his anxiety medication as needed in the meantime      Chronic combined systolic and diastolic heart failure  Grade 3  diastolic dysfunction with EF 34% on last echo.  He has pulmonary edema on exam but refuses any Lasix.      PVD (peripheral vascular disease)        Tobacco dependence  Smokes a few puffs of cigar daily.  Refuses nicotine patch    Coronary artery disease        Hypertension  Blood pressure elevated.  Resume home oral medications and monitor        Plan update today:  Continue care on telemetry.  Appreciate consultants-cardiology & psychiatry   Status post IV amiodarone infusion.  Transitioned to oral amiodarone.  Patient agreeable to compliance.  Continue IV Lasix diuresis another 24 hours.  Continue Aldactone.  Monitor urine output and fluid status.  Patient agreeable to compliance.  Continue medical management including aspirin and ACE inhibitor, titrate regimen as needed  Nuclear stress testing performed and negative. AICD interrogated.  Will need EP referral at discharge  Appreciate input from Psychiatry.  Continue Zoloft.  Continue PRN Ativan.  Continue supportive care measures   Continue home medications for chronic issues as able   Mobilize as able   Advance diet as tolerated   Serial labs, replace electrolytes as needed  GI prophylaxis with PPI  VTE prophylaxis with Lovenox  Moderate risk secondary to slowly improving acute illness no moderate intensity; prescription drug management  Possible discharge next 24 hours if remains stable and cleared by Cardiology      Jair Leon MD  Department of Hospital Medicine  [facility]

## 2023-04-22 NOTE — DISCHARGE SUMMARY
[facility]  Discharge Summary      Patient Name: Brandon Rivas  MRN: [id number]  OPAL: [id number]  Patient Class: IP- Inpatient  Admission Date: 4/16/2023  Hospital Length of Stay: 5 days  Discharge Date and Time: 4/22/2023  1:19 PM  Attending Physician: No att. providers found   Discharging Provider: Jesús Heller MD  Primary Care Provider: James Garcia MD    Primary Care Team: Networked reference to record PCT       Hospital Course:   67-year-old  male with history of CAD post stenting, chronic combined CHF likely from ischemic cardiomyopathy status post AICD placement with recent admission for NSVT during which time he was switched from mexiletine to amiodarone returned with chest pain, palpitations and shortness of breath.    He was treated with amiodarone infusion and then switched to amiodarone p.o. Seen by cardiology.  He was also treated with IV diuretics for possible component of CHF exacerbation.  Overnight he continued to have several runs of VT with longest lasting for about 8 minutes.  Heart rate in the range of 130-180s.  Discussed case with Cardiology; presentation is concerning for slow monomorphic VT likely from scar in the setting of prior MI.     Also case was discussed with electrophysiology at accepting facility (Dr. Grace).  Patient will be transferred to tertiary care facility for evaluation by Cardiac electrophysiology and likely ablation.    Seen and examined on the day of discharge.  Though he has slow ongoing VT, benefits of transfer outweigh risks.  Patient is aware and is agreement with transfer process.  Patient is hemodynamically stable. Patient is being discharged on lidocaine infusion as per recommendation from Cardiology.       Goals of Care Treatment Preferences:  Code Status: Full Code      Consults:   Consults (From admission, onward)        Status Ordering Provider     Inpatient consult to Telemedicine - Psychiatry   Once        Provider:  (Not yet assigned)    Completed HINA CONNELL          No new Assessment & Plan notes have been filed under this hospital service since the last note was generated.  Service: Hospital Medicine    Final Active Diagnoses:    Diagnosis Date Noted POA    PRINCIPAL PROBLEM:  NSVT (nonsustained ventricular tachycardia) [I47.29] 02/24/2023 Yes    ASCVD (arteriosclerotic cardiovascular disease) [I25.10] 12/18/2016 Yes      Problems Resolved During this Admission:       Discharged Condition: critical    Disposition: Short Term Hospital    Follow Up:    Patient Instructions:   No discharge procedures on file.    Significant Diagnostic Studies: Labs:   CMP   Recent Labs   Lab 04/21/23 0423 04/22/23 0518    136   K 3.9 3.7    104   CO2 25 26   GLU 95 93   BUN 17 15   CREATININE 0.8 0.9   CALCIUM 8.8 9.0   PROT 7.1 7.6   ALBUMIN 3.8 3.9   BILITOT 0.9 1.0   ALKPHOS 74 79   AST 14 14   ALT 17 15   ANIONGAP 5* 6*    and CBC   Recent Labs   Lab 04/21/23 0423 04/22/23 0518   WBC 6.75 8.48   HGB 12.5* 13.2*   HCT 37.8* 40.9    186       Pending Diagnostic Studies:     None         Medications:  Transfer Medications (for Discharge Readmit only):   No current facility-administered medications for this encounter.     No current outpatient medications on file.     Facility-Administered Medications Ordered in Other Encounters   Medication Dose Route Frequency Provider Last Rate Last Admin    acetaminophen tablet 650 mg  650 mg Oral Q4H PRN Jesús Heller MD        amiodarone 360 mg/200 mL (1.8 mg/mL) infusion  1 mg/min Intravenous Continuous Jefferson Grayson MD 33.3 mL/hr at 04/22/23 1800 1 mg/min at 04/22/23 1800    amiodarone 360 mg/200 mL (1.8 mg/mL) infusion  0.5 mg/min Intravenous Continuous Jefferson Grayson MD        [START ON 4/23/2023] aspirin chewable tablet 81 mg  81 mg Oral Daily Jesús Heller MD        butalbital-acetaminophen-caffeine -40 mg per tablet 1 tablet  1  tablet Oral Q4H PRN Jesús Heller MD        enoxaparin injection 40 mg  40 mg Subcutaneous Daily Jesús Heller MD   40 mg at 04/22/23 1705    [START ON 4/23/2023] furosemide injection 40 mg  40 mg Intravenous Daily Jesús Heller MD        LIDOcaine 2000 mg in D5W 250 mL infusion  1 mg/min Intravenous Continuous Steven Mckeon MD 7.5 mL/hr at 04/22/23 1800 1 mg/min at 04/22/23 1800    [START ON 4/23/2023] lisinopriL tablet 5 mg  5 mg Oral Daily Jesús Heller MD        LORazepam tablet 0.5 mg  0.5 mg Oral Q6H PRN Jesús Heller MD        [START ON 4/23/2023] magnesium oxide tablet 400 mg  400 mg Oral Daily Jesús Heller MD        magnesium oxide tablet 800 mg  800 mg Oral PRN Jesús Heller MD        magnesium oxide tablet 800 mg  800 mg Oral PRN Jesús Heller MD        melatonin tablet 9 mg  9 mg Oral Nightly PRN Jesús Heller MD        [START ON 4/23/2023] metoprolol succinate (TOPROL-XL) 24 hr tablet 25 mg  25 mg Oral Daily Jesús Heller MD        nitroGLYCERIN SL tablet 0.4 mg  0.4 mg Sublingual Q5 Min PRN Jesús Heller MD        ondansetron injection 4 mg  4 mg Intravenous Q8H PRN Jesús Heller MD        [START ON 4/23/2023] pantoprazole EC tablet 40 mg  40 mg Oral Before breakfast Jesús Heller MD        polyethylene glycol packet 17 g  17 g Oral BID PRN Jesús Heller MD        potassium bicarbonate disintegrating tablet 35 mEq  35 mEq Oral PRN Jesús Heller MD        potassium bicarbonate disintegrating tablet 50 mEq  50 mEq Oral PRN Jesús Heller MD        potassium bicarbonate disintegrating tablet 60 mEq  60 mEq Oral PRN Jesús Heller MD        [START ON 4/24/2023] potassium chloride CR capsule 10 mEq  10 mEq Oral Every other day Jesús Heller MD        sertraline tablet 50 mg  50 mg Oral QHS Jesús Heller MD        sodium chloride 0.9% flush 2 mL  2 mL Intravenous PRN Jesús Heller MD        [START ON 4/23/2023]  spironolactone tablet 25 mg  25 mg Oral Daily Jesús Heller MD           Time spent on the discharge of patient: 40 minutes         Jesús Heller MD  Department of Hospital Medicine  [facility]

## 2023-04-22 NOTE — ASSESSMENT & PLAN NOTE
Lab Results   Component Value Date    TROPONINI 0.033 (H) 12/19/2016     (H) 04/21/2023     Results for orders placed during the hospital encounter of 02/21/23  Echo  Interpretation Summary  · The left ventricle is mildly enlarged with moderate concentric hypertrophy evidence of old apical scar seen on apical four-chamber view  · The estimated ejection fraction is 34%.  · Grade III left ventricular diastolic dysfunction. Mean left atrial pressure 11  · There are segmental left ventricular wall motion abnormalities.  · Atrial fibrillation not observed.  · Normal right ventricular size with mildly reduced right ventricular systolic function.  · Mild-to-moderate aortic regurgitation.  · Severe mitral regurgitation.  · Intermediate central venous pressure (8 mmHg).  · The estimated PA systolic pressure is 30 mmHg. Mild pulmonary hypertension is present    Patient has sinus rhythm  - compensated on exam  - continue lisinopril, metoprolol, spironolactone  - continue asa, statin  - continue lasix 40 mg IV qd  - Cardiac diet, fluid restriction at 1500 cc with strict I/Os and daily standing weights  - Maintain Mg >2, K >4

## 2023-04-22 NOTE — NURSING
Report called to CHANTEL Malik at [facility]. Pt transferring to room 3080 via [facility]. Lidocaine gtt infusing on departure. All belongings sent with pt.

## 2023-04-22 NOTE — NURSING
Pt noted to be in vtach on the monitor. Pt AAOx4, sitting up on side of bed when I entered the room. Pt c/o dizziness and nausea. Dr. Heller contacted and at the bedside. Morning meds given, including amio and metoprolol. Will transfer to cardio A. Will continue to monitor.

## 2023-04-22 NOTE — HOSPITAL COURSE
67-year-old  male with history of CAD post stenting, chronic combined CHF likely from ischemic cardiomyopathy status post AICD placement with recent admission for NSVT during which time he was switched from mexiletine to amiodarone returned with chest pain, palpitations and shortness of breath.    He was treated with amiodarone infusion and then switched to amiodarone p.o. Seen by cardiology.  He was also treated with IV diuretics for possible component of CHF exacerbation.  Overnight he continued to have several runs of VT with longest lasting for about 8 minutes.  Heart rate in the range of 130-180s.  Discussed case with Cardiology; presentation is concerning for slow monomorphic VT likely from scar in the setting of prior MI.     Also case was discussed with electrophysiology at accepting facility (Dr. Grace).  Patient will be transferred to tertiary care facility for evaluation by Cardiac electrophysiology and likely ablation.    Seen and examined on the day of discharge.  Though he has slow ongoing VT, benefits of transfer outweigh risks.  Patient is aware and is agreement with transfer process.  Patient is hemodynamically stable. Patient is being discharged on lidocaine infusion as per recommendation from Cardiology.

## 2023-04-22 NOTE — H&P
[facility] - Cardiac Intensive Care  Cardiology  History and Physical     Patient Name: Brandon Rivas  MRN: [id number]  Admission Date: 4/22/2023  Code Status: Full Code   Attending Provider: Steven Mckeon MD   Primary Care Physician: James Garcia MD  Principal Problem:Ventricular tachycardia    Patient information was obtained from patient, past medical records and ER records.     Subjective:     Chief Complaint:  VT     HPI:  Mr. Brandon Rivas is a 67 y.o. male with CAD s/p stent (though patient does not recall), HFrEF (35%) s/p AICD, hyperlipidemia, hypertension, tobacco use who initially presented to [facility] on 4/15/2023 for chest pain, palpitations, and shortness of breath.  Reports AICD firing a week before, and has had intermittent palpitations with chest pain since then.  Questionable compliance on lasix and amiodarone.  HDS but EKG on admit was VT vs SVT, given mag, metoprolol 5 mg IV, amio 150 mg push, started on infusion with conversion to NSR.   Interogation of AICD revealed no arrhythmias since last interrogation.  Patient was discharged the next day on amio TID, however returned several hours later with palpitations, found to be in monomorphic VT.  Patient was subsequently readmitted and started on amio drip with improvement.  Stress SPECT on 4/18 showed large fixed hypokinetic defect in apex and anterior wall, with dilated left ventricle and no reversible ischemia.  No previous cardiac cath in records.  Hospitalization complicated by refusal of medications and anxiety.  Patient continued to have nonsustained VT with HR in 130s throughout admission, transferred to [facility] for EP evaluation and possible VT ablation.      Past Medical History:   Diagnosis Date    Coronary artery disease     Hypertension     MI (myocardial infarction)     Thyroid disease        Past Surgical History:   Procedure Laterality Date    CARDIAC PACEMAKER PLACEMENT         Review of  patient's allergies indicates:   Allergen Reactions    Bactrim [sulfamethoxazole-trimethoprim] Shortness Of Breath    Statins-hmg-coa reductase inhibitors Other (See Comments)     Statin intolerance. Muscle weakness    Wellbutrin [bupropion hcl] Other (See Comments)     Abdominal pain        Current Facility-Administered Medications on File Prior to Encounter   Medication    [] chlorhexidine 0.12 % solution 15 mL    [COMPLETED] magnesium sulfate in dextrose IVPB (premix) 1 g    [] mupirocin 2 % ointment    [DISCONTINUED] acetaminophen tablet 650 mg    [DISCONTINUED] amiodarone 360 mg/200 mL (1.8 mg/mL) infusion    [DISCONTINUED] amiodarone tablet 200 mg    [DISCONTINUED] amiodarone tablet 200 mg    [DISCONTINUED] aspirin chewable tablet 81 mg    [DISCONTINUED] butalbital-acetaminophen-caffeine -40 mg per tablet 1 tablet    [DISCONTINUED] enoxaparin injection 40 mg    [DISCONTINUED] furosemide injection 40 mg    [DISCONTINUED] furosemide injection 40 mg    [DISCONTINUED] LIDOcaine 4 mg/mL in dextrose 5% infusion    [DISCONTINUED] lisinopriL tablet 5 mg    [DISCONTINUED] LORazepam tablet 0.5 mg    [DISCONTINUED] magnesium oxide tablet 400 mg    [DISCONTINUED] magnesium oxide tablet 800 mg    [DISCONTINUED] magnesium oxide tablet 800 mg    [DISCONTINUED] melatonin tablet 9 mg    [DISCONTINUED] metoprolol succinate (TOPROL-XL) 24 hr tablet 25 mg    [DISCONTINUED] nitroGLYCERIN SL tablet 0.4 mg    [DISCONTINUED] ondansetron injection 4 mg    [DISCONTINUED] pantoprazole EC tablet 40 mg    [DISCONTINUED] polyethylene glycol packet 17 g    [DISCONTINUED] potassium bicarbonate disintegrating tablet 35 mEq    [DISCONTINUED] potassium bicarbonate disintegrating tablet 50 mEq    [DISCONTINUED] potassium bicarbonate disintegrating tablet 60 mEq    [DISCONTINUED] potassium chloride CR capsule 10 mEq    [DISCONTINUED] sertraline tablet 50 mg    [DISCONTINUED] sodium chloride  0.9% flush 2 mL    [DISCONTINUED] spironolactone tablet 25 mg     Current Outpatient Medications on File Prior to Encounter   Medication Sig    amiodarone (PACERONE) 200 MG Tab Take 1 tablet (200 mg total) by mouth 3 (three) times daily.    aspirin 81 MG Chew Take 1 tablet (81 mg total) by mouth once daily.    butalbital-acetaminophen-caffeine -40 mg (FIORICET, ESGIC) -40 mg per tablet Take 1 tablet by mouth every 4 (four) hours as needed for Headaches.    furosemide (LASIX) 40 MG tablet Take 1 tablet (40 mg total) by mouth once daily.    lisinopriL (PRINIVIL,ZESTRIL) 20 MG tablet Take 1 tablet (20 mg total) by mouth once daily.    LORazepam (ATIVAN) 0.5 MG tablet Take 1 tablet (0.5 mg total) by mouth every 8 (eight) hours as needed for Anxiety.    metoprolol succinate (TOPROL-XL) 50 MG 24 hr tablet Take 1 tablet (50 mg total) by mouth once daily.    nitroGLYCERIN (NITROSTAT) 0.4 MG SL tablet Place 1 tablet (0.4 mg total) under the tongue every 5 (five) minutes as needed for Chest pain.    pantoprazole (PROTONIX) 40 MG tablet Take 40 mg by mouth once daily.    potassium chloride (KLOR-CON) 10 MEQ TbSR Take 1 tablet (10 mEq total) by mouth every other day.    spironolactone (ALDACTONE) 25 MG tablet Take 1 tablet (25 mg total) by mouth once daily.    tamsulosin (FLOMAX) 0.4 mg Cap Take 1 capsule by mouth once daily.     Family History       Problem Relation (Age of Onset)    Fibromyalgia Mother    Heart disease Father    Hyperlipidemia Father          Tobacco Use    Smoking status: Every Day     Packs/day: 1.00     Years: 40.00     Pack years: 40.00     Types: Cigarettes    Smokeless tobacco: Never   Substance and Sexual Activity    Alcohol use: Yes     Comment: 12 pack beer daily    Drug use: No    Sexual activity: Not on file     Review of Systems   Constitutional: Positive for malaise/fatigue. Negative for decreased appetite, weight gain and weight loss.   Cardiovascular:  Positive for  irregular heartbeat and palpitations. Negative for orthopnea and paroxysmal nocturnal dyspnea.   Respiratory:  Positive for shortness of breath. Negative for cough.    Gastrointestinal:  Negative for bloating, diarrhea, nausea and vomiting.   Genitourinary:  Negative for dysuria and hematuria.   Neurological:  Positive for weakness. Negative for focal weakness.   Objective:     Vital Signs (Most Recent):  Temp: 98 °F (36.7 °C) (04/22/23 1419)  Pulse: (!) 135 (04/22/23 1419)  BP: 109/72 (04/22/23 1419)  SpO2: 96 % (04/22/23 1419)   Vital Signs (24h Range):  Temp:  [97.5 °F (36.4 °C)-98 °F (36.7 °C)] 98 °F (36.7 °C)  Pulse:  [] 135  Resp:  [18-20] 20  SpO2:  [95 %-96 %] 96 %  BP: ()/(53-72) 109/72     Weight: 92.1 kg (203 lb 0.7 oz)  Body mass index is 26.79 kg/m².    SpO2: 96 %       No intake or output data in the 24 hours ending 04/22/23 1433    Lines/Drains/Airways       Peripheral Intravenous Line  Duration                  Peripheral IV - Single Lumen 04/20/23 2200 20 G Distal;Left;Posterior Forearm 1 day         Peripheral IV - Single Lumen 04/22/23 1245 20 G Left Forearm <1 day                    Physical Exam  Constitutional:       General: He is not in acute distress.     Appearance: Normal appearance.   HENT:      Mouth/Throat:      Comments: Poor dentition  Eyes:      Extraocular Movements: Extraocular movements intact.      Pupils: Pupils are equal, round, and reactive to light.   Cardiovascular:      Rate and Rhythm: Normal rate and regular rhythm.      Pulses: Normal pulses.      Heart sounds: Normal heart sounds.   Pulmonary:      Effort: Pulmonary effort is normal.      Breath sounds: Normal breath sounds.   Abdominal:      General: Abdomen is flat. Bowel sounds are normal. There is no distension.      Palpations: Abdomen is soft.      Tenderness: There is no abdominal tenderness. There is no guarding.      Hernia: No hernia is present.   Musculoskeletal:      Right lower leg: No edema.       Left lower leg: No edema.   Skin:     Capillary Refill: Capillary refill takes less than 2 seconds.      Findings: No lesion.   Neurological:      General: No focal deficit present.      Mental Status: He is alert and oriented to person, place, and time.   Psychiatric:         Mood and Affect: Mood is anxious.       Significant Labs: CMP   Recent Labs   Lab 04/21/23 0423 04/22/23 0518    136   K 3.9 3.7    104   CO2 25 26   GLU 95 93   BUN 17 15   CREATININE 0.8 0.9   CALCIUM 8.8 9.0   PROT 7.1 7.6   ALBUMIN 3.8 3.9   BILITOT 0.9 1.0   ALKPHOS 74 79   AST 14 14   ALT 17 15   ANIONGAP 5* 6*    and CBC   Recent Labs   Lab 04/21/23 0423 04/22/23 0518   WBC 6.75 8.48   HGB 12.5* 13.2*   HCT 37.8* 40.9    186       Significant Imaging:  reviewed    Assessment and Plan:     * Ventricular tachycardia  67 y.o. male with CAD s/p stent (though patient does not recall), HFrEF (35%) s/p AICD, hyperlipidemia, hypertension, tobacco use who presents for nonsustained VT.  Stress test with  large fixed hypokinetic defect in apex and anterior wall.  Started on amio drip, converted to amio 200 mg TID, however patient continued to have intermittent VT so started on lidocaine drip.  On arrival, patient in VT with HR 130s, spontaneously converted to NSR.  HDS with BP 100s systolic throughout.     - continue amiodarone, restart on drip given refractory intermittent Vt  - continue lidocaine drip  - continuous tele  - will consider intubation/cessation for VT termination if refractory  - EP consulted    Chronic combined systolic and diastolic heart failure    Lab Results   Component Value Date    TROPONINI 0.033 (H) 12/19/2016     (H) 04/21/2023     Results for orders placed during the hospital encounter of 02/21/23  Echo  Interpretation Summary  · The left ventricle is mildly enlarged with moderate concentric hypertrophy evidence of old apical scar seen on apical four-chamber view  · The estimated ejection  fraction is 34%.  · Grade III left ventricular diastolic dysfunction. Mean left atrial pressure 11  · There are segmental left ventricular wall motion abnormalities.  · Atrial fibrillation not observed.  · Normal right ventricular size with mildly reduced right ventricular systolic function.  · Mild-to-moderate aortic regurgitation.  · Severe mitral regurgitation.  · Intermediate central venous pressure (8 mmHg).  · The estimated PA systolic pressure is 30 mmHg. Mild pulmonary hypertension is present    Patient has sinus rhythm  - compensated on exam  - continue lisinopril, metoprolol, spironolactone  - continue asa, statin  - continue lasix 40 mg IV qd  - Cardiac diet, fluid restriction at 1500 cc with strict I/Os and daily standing weights  - Maintain Mg >2, K >4    Tobacco dependence  - patient deferring nicotine patch    Hyperlipidemia  - continue home statin    Hypertension  - continue home antihypertensives        VTE Risk Mitigation (From admission, onward)         Ordered     enoxaparin injection 40 mg  Daily         04/22/23 1410     IP VTE HIGH RISK PATIENT  Once         04/22/23 1410     Place sequential compression device  Until discontinued         04/22/23 1410                Jefferson Grayson MD  Cardiology   [facility] - Cardiac Intensive Care

## 2023-04-22 NOTE — HPI
Mr. Brandon Rivas is a 67 y.o. male with CAD s/p stent (though patient does not recall), HFrEF (35%) s/p AICD, hyperlipidemia, hypertension, tobacco use who initially presented to [facility] on 4/15/2023 for chest pain, palpitations, and shortness of breath.  Reports AICD firing a week before, and has had intermittent palpitations with chest pain since then.  Questionable compliance on lasix and amiodarone.  HDS but EKG on admit was VT vs SVT, given mag, metoprolol 5 mg IV, amio 150 mg push, started on infusion with conversion to NSR.   Interogation of AICD revealed no arrhythmias since last interrogation.  Patient was discharged the next day on amio TID, however returned several hours later with palpitations, found to be in monomorphic VT.  Patient was subsequently readmitted and started on amio drip with improvement.  Stress SPECT on 4/18 showed large fixed hypokinetic defect in apex and anterior wall, with dilated left ventricle and no reversible ischemia.  No previous cardiac cath in records.  Hospitalization complicated by refusal of medications and anxiety.  Patient continued to have nonsustained VT with HR in 130s throughout admission, transferred to [facility] for EP evaluation and possible VT ablation.

## 2023-04-22 NOTE — PLAN OF CARE
04/22/23 1308   Final Note   Assessment Type Final Discharge Note   Anticipated Discharge Disposition Short Term   What phone number can be called within the next 1-3 days to see how you are doing after discharge? [phone]   Post-Acute Status   Discharge Delays None known at this time

## 2023-04-23 LAB
ABO + RH BLD: NORMAL
ALBUMIN SERPL BCP-MCNC: 3.2 G/DL (ref 3.5–5.2)
ALP SERPL-CCNC: 77 U/L (ref 55–135)
ALT SERPL W/O P-5'-P-CCNC: 10 U/L (ref 10–44)
ANION GAP SERPL CALC-SCNC: 10 MMOL/L (ref 8–16)
AST SERPL-CCNC: 12 U/L (ref 10–40)
BASOPHILS # BLD AUTO: 0.04 K/UL (ref 0–0.2)
BASOPHILS NFR BLD: 0.6 % (ref 0–1.9)
BILIRUB SERPL-MCNC: 0.4 MG/DL (ref 0.1–1)
BLD GP AB SCN CELLS X3 SERPL QL: NORMAL
BUN SERPL-MCNC: 14 MG/DL (ref 8–23)
CALCIUM SERPL-MCNC: 8.2 MG/DL (ref 8.7–10.5)
CHLORIDE SERPL-SCNC: 100 MMOL/L (ref 95–110)
CO2 SERPL-SCNC: 21 MMOL/L (ref 23–29)
CREAT SERPL-MCNC: 0.8 MG/DL (ref 0.5–1.4)
DIFFERENTIAL METHOD: ABNORMAL
EOSINOPHIL # BLD AUTO: 0.1 K/UL (ref 0–0.5)
EOSINOPHIL NFR BLD: 1.4 % (ref 0–8)
ERYTHROCYTE [DISTWIDTH] IN BLOOD BY AUTOMATED COUNT: 14.8 % (ref 11.5–14.5)
EST. GFR  (NO RACE VARIABLE): >60 ML/MIN/1.73 M^2
GLUCOSE SERPL-MCNC: 236 MG/DL (ref 70–110)
HCT VFR BLD AUTO: 38.5 % (ref 40–54)
HGB BLD-MCNC: 12.6 G/DL (ref 14–18)
IMM GRANULOCYTES # BLD AUTO: 0.02 K/UL (ref 0–0.04)
IMM GRANULOCYTES NFR BLD AUTO: 0.3 % (ref 0–0.5)
LYMPHOCYTES # BLD AUTO: 1.7 K/UL (ref 1–4.8)
LYMPHOCYTES NFR BLD: 23.9 % (ref 18–48)
MAGNESIUM SERPL-MCNC: 1.9 MG/DL (ref 1.6–2.6)
MCH RBC QN AUTO: 30.7 PG (ref 27–31)
MCHC RBC AUTO-ENTMCNC: 32.7 G/DL (ref 32–36)
MCV RBC AUTO: 94 FL (ref 82–98)
MONOCYTES # BLD AUTO: 0.8 K/UL (ref 0.3–1)
MONOCYTES NFR BLD: 11.3 % (ref 4–15)
NEUTROPHILS # BLD AUTO: 4.5 K/UL (ref 1.8–7.7)
NEUTROPHILS NFR BLD: 62.5 % (ref 38–73)
NRBC BLD-RTO: 0 /100 WBC
PHOSPHATE SERPL-MCNC: 3.7 MG/DL (ref 2.7–4.5)
PLATELET # BLD AUTO: 190 K/UL (ref 150–450)
PMV BLD AUTO: 11.3 FL (ref 9.2–12.9)
POCT GLUCOSE: 98 MG/DL (ref 70–110)
POTASSIUM SERPL-SCNC: 3.7 MMOL/L (ref 3.5–5.1)
PROT SERPL-MCNC: 6.6 G/DL (ref 6–8.4)
RBC # BLD AUTO: 4.1 M/UL (ref 4.6–6.2)
SODIUM SERPL-SCNC: 131 MMOL/L (ref 136–145)
SPECIMEN OUTDATE: NORMAL
WBC # BLD AUTO: 7.2 K/UL (ref 3.9–12.7)

## 2023-04-23 PROCEDURE — 99900035 HC TECH TIME PER 15 MIN (STAT)

## 2023-04-23 PROCEDURE — 63600175 PHARM REV CODE 636 W HCPCS: Performed by: STUDENT IN AN ORGANIZED HEALTH CARE EDUCATION/TRAINING PROGRAM

## 2023-04-23 PROCEDURE — 80053 COMPREHEN METABOLIC PANEL: CPT | Performed by: INTERNAL MEDICINE

## 2023-04-23 PROCEDURE — 25000003 PHARM REV CODE 250: Performed by: INTERNAL MEDICINE

## 2023-04-23 PROCEDURE — 86900 BLOOD TYPING SEROLOGIC ABO: CPT | Performed by: STUDENT IN AN ORGANIZED HEALTH CARE EDUCATION/TRAINING PROGRAM

## 2023-04-23 PROCEDURE — 99223 PR INITIAL HOSPITAL CARE,LEVL III: ICD-10-PCS | Mod: ,,, | Performed by: INTERNAL MEDICINE

## 2023-04-23 PROCEDURE — 63600175 PHARM REV CODE 636 W HCPCS: Performed by: INTERNAL MEDICINE

## 2023-04-23 PROCEDURE — 85025 COMPLETE CBC W/AUTO DIFF WBC: CPT | Performed by: INTERNAL MEDICINE

## 2023-04-23 PROCEDURE — 84100 ASSAY OF PHOSPHORUS: CPT | Performed by: STUDENT IN AN ORGANIZED HEALTH CARE EDUCATION/TRAINING PROGRAM

## 2023-04-23 PROCEDURE — 63600175 PHARM REV CODE 636 W HCPCS

## 2023-04-23 PROCEDURE — 27000221 HC OXYGEN, UP TO 24 HOURS

## 2023-04-23 PROCEDURE — 20000000 HC ICU ROOM

## 2023-04-23 PROCEDURE — 99291 PR CRITICAL CARE, E/M 30-74 MINUTES: ICD-10-PCS | Mod: GC,,, | Performed by: INTERNAL MEDICINE

## 2023-04-23 PROCEDURE — 99291 CRITICAL CARE FIRST HOUR: CPT | Mod: GC,,, | Performed by: INTERNAL MEDICINE

## 2023-04-23 PROCEDURE — 99223 1ST HOSP IP/OBS HIGH 75: CPT | Mod: ,,, | Performed by: INTERNAL MEDICINE

## 2023-04-23 PROCEDURE — 25000003 PHARM REV CODE 250: Performed by: STUDENT IN AN ORGANIZED HEALTH CARE EDUCATION/TRAINING PROGRAM

## 2023-04-23 PROCEDURE — 83735 ASSAY OF MAGNESIUM: CPT | Performed by: INTERNAL MEDICINE

## 2023-04-23 PROCEDURE — 94761 N-INVAS EAR/PLS OXIMETRY MLT: CPT

## 2023-04-23 RX ORDER — LIDOCAINE HYDROCHLORIDE 20 MG/ML
100 INJECTION INTRAVENOUS ONCE
Status: COMPLETED | OUTPATIENT
Start: 2023-04-24 | End: 2023-04-24

## 2023-04-23 RX ORDER — PROMETHAZINE HYDROCHLORIDE 12.5 MG/1
12.5 TABLET ORAL EVERY 6 HOURS PRN
Status: DISCONTINUED | OUTPATIENT
Start: 2023-04-23 | End: 2023-04-26 | Stop reason: HOSPADM

## 2023-04-23 RX ORDER — GLUCAGON 1 MG
1 KIT INJECTION
Status: DISCONTINUED | OUTPATIENT
Start: 2023-04-23 | End: 2023-04-26 | Stop reason: HOSPADM

## 2023-04-23 RX ORDER — INSULIN ASPART 100 [IU]/ML
0-5 INJECTION, SOLUTION INTRAVENOUS; SUBCUTANEOUS
Status: DISCONTINUED | OUTPATIENT
Start: 2023-04-23 | End: 2023-04-26 | Stop reason: HOSPADM

## 2023-04-23 RX ORDER — DEXTROSE 40 %
15 GEL (GRAM) ORAL
Status: DISCONTINUED | OUTPATIENT
Start: 2023-04-23 | End: 2023-04-26 | Stop reason: HOSPADM

## 2023-04-23 RX ORDER — DEXTROSE 40 %
30 GEL (GRAM) ORAL
Status: DISCONTINUED | OUTPATIENT
Start: 2023-04-23 | End: 2023-04-26 | Stop reason: HOSPADM

## 2023-04-23 RX ORDER — LIDOCAINE HYDROCHLORIDE 20 MG/ML
INJECTION INTRAVENOUS
Status: COMPLETED
Start: 2023-04-23 | End: 2023-04-24

## 2023-04-23 RX ADMIN — LISINOPRIL 5 MG: 5 TABLET ORAL at 08:04

## 2023-04-23 RX ADMIN — ACETAMINOPHEN 650 MG: 325 TABLET ORAL at 10:04

## 2023-04-23 RX ADMIN — MUPIROCIN: 20 OINTMENT TOPICAL at 08:04

## 2023-04-23 RX ADMIN — FUROSEMIDE 40 MG: 10 INJECTION, SOLUTION INTRAMUSCULAR; INTRAVENOUS at 08:04

## 2023-04-23 RX ADMIN — LIDOCAINE HYDROCHLORIDE 2 MG/MIN: 8 INJECTION, SOLUTION INTRAVENOUS at 12:04

## 2023-04-23 RX ADMIN — LORAZEPAM 0.5 MG: 0.5 TABLET ORAL at 09:04

## 2023-04-23 RX ADMIN — PROMETHAZINE HYDROCHLORIDE 12.5 MG: 12.5 TABLET ORAL at 02:04

## 2023-04-23 RX ADMIN — AMIODARONE HYDROCHLORIDE 1 MG/MIN: 1.8 INJECTION, SOLUTION INTRAVENOUS at 03:04

## 2023-04-23 RX ADMIN — Medication 400 MG: at 08:04

## 2023-04-23 RX ADMIN — SERTRALINE HYDROCHLORIDE 50 MG: 50 TABLET ORAL at 09:04

## 2023-04-23 RX ADMIN — AMIODARONE HYDROCHLORIDE 150 MG: 1.5 INJECTION, SOLUTION INTRAVENOUS at 11:04

## 2023-04-23 RX ADMIN — PANTOPRAZOLE SODIUM 40 MG: 40 TABLET, DELAYED RELEASE ORAL at 06:04

## 2023-04-23 RX ADMIN — ASPIRIN 81 MG CHEWABLE TABLET 81 MG: 81 TABLET CHEWABLE at 08:04

## 2023-04-23 RX ADMIN — MUPIROCIN: 20 OINTMENT TOPICAL at 09:04

## 2023-04-23 RX ADMIN — AMIODARONE HYDROCHLORIDE 1 MG/MIN: 1.8 INJECTION, SOLUTION INTRAVENOUS at 09:04

## 2023-04-23 RX ADMIN — ONDANSETRON 4 MG: 2 INJECTION INTRAMUSCULAR; INTRAVENOUS at 10:04

## 2023-04-23 RX ADMIN — ENOXAPARIN SODIUM 40 MG: 40 INJECTION SUBCUTANEOUS at 05:04

## 2023-04-23 RX ADMIN — SPIRONOLACTONE 25 MG: 25 TABLET, FILM COATED ORAL at 08:04

## 2023-04-23 RX ADMIN — ACETAMINOPHEN 650 MG: 325 TABLET ORAL at 09:04

## 2023-04-23 RX ADMIN — POTASSIUM BICARBONATE 50 MEQ: 978 TABLET, EFFERVESCENT ORAL at 08:04

## 2023-04-23 RX ADMIN — METOPROLOL SUCCINATE 25 MG: 25 TABLET, EXTENDED RELEASE ORAL at 08:04

## 2023-04-23 NOTE — SUBJECTIVE & OBJECTIVE
Past Medical History:   Diagnosis Date    Coronary artery disease     Hypertension     MI (myocardial infarction)     Thyroid disease        Past Surgical History:   Procedure Laterality Date    CARDIAC PACEMAKER PLACEMENT         Review of patient's allergies indicates:   Allergen Reactions    Bactrim [sulfamethoxazole-trimethoprim] Shortness Of Breath    Statins-hmg-coa reductase inhibitors Other (See Comments)     Statin intolerance. Muscle weakness    Wellbutrin [bupropion hcl] Other (See Comments)     Abdominal pain        Current Facility-Administered Medications on File Prior to Encounter   Medication    [COMPLETED] magnesium sulfate in dextrose IVPB (premix) 1 g    [DISCONTINUED] acetaminophen tablet 650 mg    [DISCONTINUED] amiodarone 360 mg/200 mL (1.8 mg/mL) infusion    [DISCONTINUED] amiodarone tablet 200 mg    [DISCONTINUED] amiodarone tablet 200 mg    [DISCONTINUED] aspirin chewable tablet 81 mg    [DISCONTINUED] butalbital-acetaminophen-caffeine -40 mg per tablet 1 tablet    [DISCONTINUED] enoxaparin injection 40 mg    [DISCONTINUED] furosemide injection 40 mg    [DISCONTINUED] LIDOcaine 4 mg/mL in dextrose 5% infusion    [DISCONTINUED] lisinopriL tablet 5 mg    [DISCONTINUED] LORazepam tablet 0.5 mg    [DISCONTINUED] magnesium oxide tablet 400 mg    [DISCONTINUED] magnesium oxide tablet 800 mg    [DISCONTINUED] magnesium oxide tablet 800 mg    [DISCONTINUED] melatonin tablet 9 mg    [DISCONTINUED] metoprolol succinate (TOPROL-XL) 24 hr tablet 25 mg    [DISCONTINUED] nitroGLYCERIN SL tablet 0.4 mg    [DISCONTINUED] ondansetron injection 4 mg    [DISCONTINUED] pantoprazole EC tablet 40 mg    [DISCONTINUED] polyethylene glycol packet 17 g    [DISCONTINUED] potassium bicarbonate disintegrating tablet 35 mEq    [DISCONTINUED] potassium bicarbonate disintegrating tablet 50 mEq    [DISCONTINUED] potassium bicarbonate disintegrating tablet 60 mEq    [DISCONTINUED] potassium chloride CR capsule 10 mEq     [DISCONTINUED] sertraline tablet 50 mg    [DISCONTINUED] sodium chloride 0.9% flush 2 mL    [DISCONTINUED] spironolactone tablet 25 mg     Current Outpatient Medications on File Prior to Encounter   Medication Sig    amiodarone (PACERONE) 200 MG Tab Take 1 tablet (200 mg total) by mouth 3 (three) times daily.    butalbital-acetaminophen-caffeine -40 mg (FIORICET, ESGIC) -40 mg per tablet Take 1 tablet by mouth every 4 (four) hours as needed for Headaches.    furosemide (LASIX) 40 MG tablet Take 1 tablet (40 mg total) by mouth once daily.    lisinopriL (PRINIVIL,ZESTRIL) 20 MG tablet Take 1 tablet (20 mg total) by mouth once daily.    metoprolol succinate (TOPROL-XL) 50 MG 24 hr tablet Take 1 tablet (50 mg total) by mouth once daily.    nitroGLYCERIN (NITROSTAT) 0.4 MG SL tablet Place 1 tablet (0.4 mg total) under the tongue every 5 (five) minutes as needed for Chest pain.    pantoprazole (PROTONIX) 40 MG tablet Take 40 mg by mouth once daily.    potassium chloride (KLOR-CON) 10 MEQ TbSR Take 1 tablet (10 mEq total) by mouth every other day.    spironolactone (ALDACTONE) 25 MG tablet Take 1 tablet (25 mg total) by mouth once daily.    aspirin 81 MG Chew Take 1 tablet (81 mg total) by mouth once daily.    LORazepam (ATIVAN) 0.5 MG tablet Take 1 tablet (0.5 mg total) by mouth every 8 (eight) hours as needed for Anxiety.    tamsulosin (FLOMAX) 0.4 mg Cap Take 1 capsule by mouth once daily.     Family History       Problem Relation (Age of Onset)    Fibromyalgia Mother    Heart disease Father    Hyperlipidemia Father          Tobacco Use    Smoking status: Every Day     Packs/day: 1.00     Years: 40.00     Pack years: 40.00     Types: Cigarettes    Smokeless tobacco: Never   Substance and Sexual Activity    Alcohol use: Yes     Comment: 12 pack beer daily    Drug use: No    Sexual activity: Not on file     Review of Systems   Constitutional: Positive for malaise/fatigue.   Objective:     Vital Signs (Most  Recent):  Temp: 97.9 °F (36.6 °C) (04/23/23 0700)  Pulse: (!) 52 (04/23/23 0900)  Resp: 20 (04/23/23 0900)  BP: 114/70 (04/23/23 0900)  SpO2: 95 % (04/23/23 0900)   Vital Signs (24h Range):  Temp:  [97.6 °F (36.4 °C)-98 °F (36.7 °C)] 97.9 °F (36.6 °C)  Pulse:  [] 52  Resp:  [11-26] 20  SpO2:  [94 %-97 %] 95 %  BP: ()/(52-74) 114/70       Weight: 92.1 kg (203 lb 0.7 oz)  Body mass index is 26.79 kg/m².    SpO2: 95 %       Physical Exam  Constitutional:       General: He is not in acute distress.     Appearance: Normal appearance. He is not ill-appearing.   HENT:      Head: Normocephalic and atraumatic.      Nose: No congestion.      Mouth/Throat:      Mouth: Mucous membranes are moist.   Eyes:      Conjunctiva/sclera: Conjunctivae normal.   Cardiovascular:      Rate and Rhythm: Normal rate and regular rhythm.      Pulses: Normal pulses.   Pulmonary:      Effort: Pulmonary effort is normal. No respiratory distress.   Abdominal:      General: Abdomen is flat. There is no distension.      Palpations: Abdomen is soft.   Musculoskeletal:      Cervical back: Normal range of motion.      Right lower leg: No edema.      Left lower leg: No edema.   Skin:     Capillary Refill: Capillary refill takes less than 2 seconds.      Findings: No rash.   Neurological:      Mental Status: He is alert and oriented to person, place, and time.   Psychiatric:         Mood and Affect: Mood normal.       Significant Labs: All pertinent lab results from the last 24 hours have been reviewed.

## 2023-04-23 NOTE — HPI
67 y.o. male with CAD s/p stent (though patient does not recall), HFrEF (35%) s/p AICD, hyperlipidemia, hypertension, tobacco use who initially presented to [facility] on 4/15/2023 for chest pain, palpitations, and shortness of breath.  Reports AICD firing a week before, and has had intermittent palpitations with chest pain since then.  Questionable compliance on lasix and amiodarone.  HDS but EKG on admit was VT vs SVT, given mag, metoprolol 5 mg IV, amio 150 mg push, started on infusion with conversion to NSR.   Interogation of AICD revealed no arrhythmias since last interrogation.  Patient was discharged the next day on amio TID, however returned several hours later with palpitations, found to be in monomorphic VT.  Patient was subsequently readmitted and started on amio drip with improvement.  Stress SPECT on 4/18 showed large fixed hypokinetic defect in apex and anterior wall, with dilated left ventricle and no reversible ischemia.  No previous cardiac cath in records.  Hospitalization complicated by refusal of medications and anxiety.  Patient continued to have nonsustained VT with HR in 130s throughout admission, transferred to [facility] [place] evaluation and possible VT ablation.

## 2023-04-23 NOTE — PROGRESS NOTES
[facility]  Cardiology  Progress Note    Patient Name: Brandon Rivas  MRN: [id number]  Admission Date: 4/22/2023  Hospital Length of Stay: 1 days  Code Status: Full Code   Attending Physician: Steven Mckeon MD   Primary Care Physician: James Garcia MD  Expected Discharge Date:   Principal Problem:Ventricular tachycardia    Subjective:     Hospital Course:   Admitted to CCU for monomorphic VT.  Patient initially in VT with rates 120s, converted to sinus michelle (HR 50s) on admission.  Reloaded amio IV at 1mg/h.  Went back into VT to 120s, paced out of VT at bedside.  EP consulted, plan for VT ablation.       Interval History: naeon, this AM in sinus michelle with HR in 50s.  Denies chest pain, shortness of breath.  NPO at midnight for VT ablation tomorrow.    Review of Systems   Constitutional: Positive for malaise/fatigue. Negative for decreased appetite, weight gain and weight loss.   Cardiovascular:  Positive for irregular heartbeat and palpitations. Negative for orthopnea and paroxysmal nocturnal dyspnea.   Respiratory:  Positive for shortness of breath. Negative for cough.    Gastrointestinal:  Negative for bloating, diarrhea, nausea and vomiting.   Genitourinary:  Negative for dysuria and hematuria.   Neurological:  Positive for weakness. Negative for focal weakness.   Objective:     Vital Signs (Most Recent):  Temp: 97.9 °F (36.6 °C) (04/23/23 0700)  Pulse: (!) 51 (04/23/23 1000)  Resp: 20 (04/23/23 1000)  BP: (!) 107/58 (04/23/23 1000)  SpO2: 95 % (04/23/23 1000)   Vital Signs (24h Range):  Temp:  [97.6 °F (36.4 °C)-98 °F (36.7 °C)] 97.9 °F (36.6 °C)  Pulse:  [] 51  Resp:  [11-26] 20  SpO2:  [94 %-97 %] 95 %  BP: ()/(52-74) 107/58     Weight: 92.1 kg (203 lb 0.7 oz)  Body mass index is 26.79 kg/m².     SpO2: 95 %         Intake/Output Summary (Last 24 hours) at 4/23/2023 1032  Last data filed at 4/23/2023 1000  Gross per 24 hour   Intake 1726.21 ml   Output  1075 ml   Net 651.21 ml       Lines/Drains/Airways       Peripheral Intravenous Line  Duration                  Peripheral IV - Single Lumen 04/20/23 2200 20 G Distal;Left;Posterior Forearm 2 days         Peripheral IV - Single Lumen 04/22/23 1245 20 G Left Forearm <1 day                    Physical Exam  Constitutional:       General: He is not in acute distress.     Appearance: Normal appearance.   HENT:      Mouth/Throat:      Comments: Poor dentition  Eyes:      Extraocular Movements: Extraocular movements intact.      Pupils: Pupils are equal, round, and reactive to light.   Cardiovascular:      Rate and Rhythm: Normal rate and regular rhythm.      Pulses: Normal pulses.      Heart sounds: Normal heart sounds.   Pulmonary:      Effort: Pulmonary effort is normal.      Breath sounds: Normal breath sounds.   Abdominal:      General: Abdomen is flat. Bowel sounds are normal. There is no distension.      Palpations: Abdomen is soft.      Tenderness: There is no abdominal tenderness. There is no guarding.      Hernia: No hernia is present.   Musculoskeletal:      Right lower leg: No edema.      Left lower leg: No edema.   Skin:     Capillary Refill: Capillary refill takes less than 2 seconds.      Findings: No lesion.   Neurological:      General: No focal deficit present.      Mental Status: He is alert and oriented to person, place, and time.   Psychiatric:         Mood and Affect: Mood is anxious.       Significant Labs: All pertinent lab results from the last 24 hours have been reviewed.    Significant Imaging:  reviewed    Assessment and Plan:     * Ventricular tachycardia  67 y.o. male with CAD s/p stent (though patient does not recall), HFrEF (35%) s/p AICD, hyperlipidemia, hypertension, tobacco use who presents for nonsustained VT.  Stress test with  large fixed hypokinetic defect in apex and anterior wall.  Started on amio drip, converted to amio 200 mg TID, however patient continued to have intermittent VT  so started on lidocaine drip.  On arrival, patient in VT with HR 130s, spontaneously converted to NSR.  HDS with BP 100s systolic throughout.     - continue amiodarone 1mg gty, restarted on drip given refractory intermittent Vt  - continue lidocaine drip at 2mg gtt  - continuous tele  - will consider intubation/cessation for VT termination if refractory  - EP consulted  - TTE with optison to r/o LV thrombus, aneurysm ordered   - Plan for VT ablation tomorrow, NPO at [facility]    Chronic combined systolic and diastolic heart failure    Lab Results   Component Value Date    TROPONINI 0.033 (H) 12/19/2016     (H) 04/21/2023     Results for orders placed during the hospital encounter of 02/21/23  Echo  Interpretation Summary  · The left ventricle is mildly enlarged with moderate concentric hypertrophy evidence of old apical scar seen on apical four-chamber view  · The estimated ejection fraction is 34%.  · Grade III left ventricular diastolic dysfunction. Mean left atrial pressure 11  · There are segmental left ventricular wall motion abnormalities.  · Atrial fibrillation not observed.  · Normal right ventricular size with mildly reduced right ventricular systolic function.  · Mild-to-moderate aortic regurgitation.  · Severe mitral regurgitation.  · Intermediate central venous pressure (8 mmHg).  · The estimated PA systolic pressure is 30 mmHg. Mild pulmonary hypertension is present    Patient has sinus rhythm  - compensated on exam  - continue lisinopril, metoprolol, spironolactone  - continue asa, statin  - continue lasix 40 mg IV qd  - Cardiac diet, fluid restriction at 1500 cc with strict I/Os and daily standing weights  - Maintain Mg >2, K >4    Tobacco dependence  - patient deferring nicotine patch    Hyperlipidemia  - continue home statin    Hypertension  - continue home antihypertensives        VTE Risk Mitigation (From admission, onward)         Ordered     enoxaparin injection 40 mg  Daily         04/22/23  1410     IP VTE HIGH RISK PATIENT  Once         04/22/23 1410     Place sequential compression device  Until discontinued         04/22/23 1410                Jefferson Grayson MD  Cardiology  [facility]

## 2023-04-23 NOTE — ASSESSMENT & PLAN NOTE
Patient transferred for MMVT. Interrogation with episode of VT approx 1 week ago which was successfully terminated with ICD discharge. Patient appeared to have been in slow MMVT earlier tonight, found when doing bedside rounds. He was successful paced to NSR multiple times. Reprogrammed to VT therapy zone of 115 with ATP followed by shocks. Rebolused amiodarone and increased rate to 1, increased lidocaine to 2.     Recs  - continue amiodarone at 1, lidocaine at 2  - device reprogrammed for lower VT zone, formal device interrogation ordered  - continue BB, uptirate as tolerated

## 2023-04-23 NOTE — PLAN OF CARE
ICU Care Plan  [facility]    POC reviewed with Brandon Rivas. Pt verbalized understanding. Questions answered and concerns addressed. Pt progressing toward goals. See below and flowsheets for full assessment and VS info.   Temp:  [97.6 °F (36.4 °C)-97.9 °F (36.6 °C)]   Pulse:  []   Resp:  [11-26]   BP: ()/(59-70)   SpO2:  [94 %-97 %]     Neuro:  Rhea Coma Scale  Best Eye Response: 4-->(E4) spontaneous  Best Motor Response: 6-->(M6) obeys commands  Best Verbal Response: 5-->(V5) oriented  Belding Coma Scale Score: 15   Pupil PERRLA: yes  24hr Temp:  [97.6 °F (36.4 °C)-98 °F (36.7 °C)]     CV:   Rhythm: sinus bradycardia    Resp:   Device (Oxygen Therapy): nasal cannula  Flow (L/min): 2    GI/:  Diet/Nutrition Received: low saturated fat/low cholesterol, restrict fluids  Last Bowel Movement: 04/19/23  Voiding Characteristics: voids spontaneously without difficulty  I/O this shift:  In: 829.9 [P.O.:240; I.V.:489.9; IV Piggyback:100]  Out: 475 [Urine:475]    Intake/Output Summary (Last 24 hours) at 4/23/2023 0609  Last data filed at 4/23/2023 0600  Gross per 24 hour   Intake 1292.98 ml   Output 750 ml   Net 542.98 ml     Gtts/LDAs:   amiodarone in dextrose 5% 1 mg/min (04/23/23 0600)    LIDOcaine 2 mg/min (04/23/23 0600)     Lines/Drains/Airways       Peripheral Intravenous Line  Duration                  Peripheral IV - Single Lumen 04/20/23 2200 20 G Distal;Left;Posterior Forearm 2 days         Peripheral IV - Single Lumen 04/22/23 1245 20 G Left Forearm <1 day                  Recent Labs   Lab 04/23/23  0235   WBC 7.20   RBC 4.10*   HGB 12.6*   HCT 38.5*         Recent Labs   Lab 04/23/23  0235   *   K 3.7   CO2 21*      BUN 14   CREATININE 0.8   ALKPHOS 77   ALT 10   AST 12   BILITOT 0.4      Recent Labs   Lab 04/18/23  0527   INR 1.0   APTT 30.2    No results for input(s): CPK, CPKMB, TROPONINI, MB in the last 168 hours.    Electrolytes: No  replacement orders  Accuchecks: none

## 2023-04-23 NOTE — ASSESSMENT & PLAN NOTE
67 y.o. male with CAD s/p stent (though patient does not recall), HFrEF (35%) s/p AICD, hyperlipidemia, hypertension, tobacco use who presents for nonsustained VT.  Stress test with  large fixed hypokinetic defect in apex and anterior wall.  Started on amio drip, converted to amio 200 mg TID, however patient continued to have intermittent VT so started on lidocaine drip.  On arrival, patient in VT with HR 130s, spontaneously converted to NSR.  HDS with BP 100s systolic throughout.     - continue amiodarone 1mg gty, restarted on drip given refractory intermittent Vt  - continue lidocaine drip at 2mg gtt  - continuous tele  - will consider intubation/cessation for VT termination if refractory  - EP consulted  - TTE with optison to r/o LV thrombus, aneurysm ordered   - Plan for VT ablation tomorrow, NPO at [facility]

## 2023-04-23 NOTE — SUBJECTIVE & OBJECTIVE
Interval History: ortizeon, this AM in sinus michelle with HR in 50s.  Denies chest pain, shortness of breath.  NPO at midnight for VT ablation tomorrow.    Review of Systems   Constitutional: Positive for malaise/fatigue. Negative for decreased appetite, weight gain and weight loss.   Cardiovascular:  Positive for irregular heartbeat and palpitations. Negative for orthopnea and paroxysmal nocturnal dyspnea.   Respiratory:  Positive for shortness of breath. Negative for cough.    Gastrointestinal:  Negative for bloating, diarrhea, nausea and vomiting.   Genitourinary:  Negative for dysuria and hematuria.   Neurological:  Positive for weakness. Negative for focal weakness.   Objective:     Vital Signs (Most Recent):  Temp: 97.9 °F (36.6 °C) (04/23/23 0700)  Pulse: (!) 51 (04/23/23 1000)  Resp: 20 (04/23/23 1000)  BP: (!) 107/58 (04/23/23 1000)  SpO2: 95 % (04/23/23 1000)   Vital Signs (24h Range):  Temp:  [97.6 °F (36.4 °C)-98 °F (36.7 °C)] 97.9 °F (36.6 °C)  Pulse:  [] 51  Resp:  [11-26] 20  SpO2:  [94 %-97 %] 95 %  BP: ()/(52-74) 107/58     Weight: 92.1 kg (203 lb 0.7 oz)  Body mass index is 26.79 kg/m².     SpO2: 95 %         Intake/Output Summary (Last 24 hours) at 4/23/2023 1032  Last data filed at 4/23/2023 1000  Gross per 24 hour   Intake 1726.21 ml   Output 1075 ml   Net 651.21 ml       Lines/Drains/Airways       Peripheral Intravenous Line  Duration                  Peripheral IV - Single Lumen 04/20/23 2200 20 G Distal;Left;Posterior Forearm 2 days         Peripheral IV - Single Lumen 04/22/23 1245 20 G Left Forearm <1 day                    Physical Exam  Constitutional:       General: He is not in acute distress.     Appearance: Normal appearance.   HENT:      Mouth/Throat:      Comments: Poor dentition  Eyes:      Extraocular Movements: Extraocular movements intact.      Pupils: Pupils are equal, round, and reactive to light.   Cardiovascular:      Rate and Rhythm: Normal rate and regular rhythm.       Pulses: Normal pulses.      Heart sounds: Normal heart sounds.   Pulmonary:      Effort: Pulmonary effort is normal.      Breath sounds: Normal breath sounds.   Abdominal:      General: Abdomen is flat. Bowel sounds are normal. There is no distension.      Palpations: Abdomen is soft.      Tenderness: There is no abdominal tenderness. There is no guarding.      Hernia: No hernia is present.   Musculoskeletal:      Right lower leg: No edema.      Left lower leg: No edema.   Skin:     Capillary Refill: Capillary refill takes less than 2 seconds.      Findings: No lesion.   Neurological:      General: No focal deficit present.      Mental Status: He is alert and oriented to person, place, and time.   Psychiatric:         Mood and Affect: Mood is anxious.       Significant Labs: All pertinent lab results from the last 24 hours have been reviewed.    Significant Imaging:  reviewed

## 2023-04-23 NOTE — CARE UPDATE
EP update:     -Please obtain TTE with optison to r/o LV thrombus, aneurysm.   - Planning for VT ablation tomorrow with Dr. Grace.             Cynthia Chapman MD  Cardiac Electrophysiology  [facility]

## 2023-04-23 NOTE — CARE UPDATE
Patient appeared to have been in slow MMVT earlier tonight, found when doing bedside rounds. He was successful paced to NSR multiple times. Reprogrammed to VT therapy zone of 115 with ATP followed by shocks. See EP note for full details. Rebolused amiodarone and increased rate to 1, increased lidocaine to 2.

## 2023-04-23 NOTE — HOSPITAL COURSE
Admitted to CCU for monomorphic VT.  Patient initially in VT with rates 120s, converted to sinus michelle (HR 50s) on admission.  Reloaded amio IV at 1mg/h and started on a lidocaine infusion.  Went back into VT to 120s, paced out of VT at bedside.  EP consulted, patient underwent ablation on 4/24 for VT arising from apical/apical anterior LV. After the procedure patients HR remained in the 40-50's. Lidocaine was stopped and he was switched to PO amio. Metoprolol dose lowered to 12.5. Patient to follow-up with EP Dr. Grace in 3-months. He is on lisinopril, lasix, Toprol, and spironolactone for his GDMT. Patient is not being sent home with a SGLT2 due to price. Patient with significant anxiety about his health, starting him on zoloft.     Physical Exam  Constitutional:       General: He is not in acute distress.     Appearance: Normal appearance.   HENT:      Mouth/Throat:      Comments: Poor dentition  Eyes:      Extraocular Movements: Extraocular movements intact.      Pupils: Pupils are equal, round, and reactive to light.   Cardiovascular:      Rate and Rhythm: Normal rate and regular rhythm.      Pulses: Normal pulses.      Heart sounds: Normal heart sounds.   Pulmonary:      Effort: Pulmonary effort is normal.      Breath sounds: Normal breath sounds.   Abdominal:      General: Abdomen is flat. Bowel sounds are normal. There is no distension.      Palpations: Abdomen is soft.      Tenderness: There is no abdominal tenderness. There is no guarding.      Hernia: No hernia is present.   Musculoskeletal:      Right lower leg: No edema.      Left lower leg: No edema.   Skin:     Capillary Refill: Capillary refill takes less than 2 seconds.      Findings: No lesion.   Neurological:      General: No focal deficit present.      Mental Status: He is alert and oriented to person, place, and time.   Psychiatric:         Mood and Affect: Mood is anxious.

## 2023-04-23 NOTE — CONSULTS
[facility]  Cardiac Electrophysiology  Consult Note    Admission Date: 4/22/2023  Code Status: Full Code   Attending Provider: Steven Mckeon MD  Consulting Provider: Gualberto Andino MD  Principal Problem:Ventricular tachycardia    Inpatient consult to Electrophysiology  Consult performed by: Gualberto Andino MD  Consult ordered by: Gualberto Andino MD        Subjective:     Chief Complaint:  VT    HPI:    67 y.o. male with CAD s/p stent (though patient does not recall), HFrEF (35%) s/p AICD, hyperlipidemia, hypertension, tobacco use who initially presented to [facility] on 4/15/2023 for chest pain, palpitations, and shortness of breath.  Reports AICD firing a week before, and has had intermittent palpitations with chest pain since then.  Questionable compliance on lasix and amiodarone.  HDS but EKG on admit was VT vs SVT, given mag, metoprolol 5 mg IV, amio 150 mg push, started on infusion with conversion to NSR.   Interogation of AICD revealed no arrhythmias since last interrogation.  Patient was discharged the next day on amio TID, however returned several hours later with palpitations, found to be in monomorphic VT.  Patient was subsequently readmitted and started on amio drip with improvement.  Stress SPECT on 4/18 showed large fixed hypokinetic defect in apex and anterior wall, with dilated left ventricle and no reversible ischemia.  No previous cardiac cath in records.  Hospitalization complicated by refusal of medications and anxiety.  Patient continued to have nonsustained VT with HR in 130s throughout admission, [place] [facility] [place] and possible VT ablation.      Past Medical History:   Diagnosis Date    Coronary artery disease     Hypertension     MI (myocardial infarction)     Thyroid disease        Past Surgical History:   Procedure Laterality Date    CARDIAC PACEMAKER PLACEMENT         Review of patient's allergies indicates:   Allergen Reactions     Bactrim [sulfamethoxazole-trimethoprim] Shortness Of Breath    Statins-hmg-coa reductase inhibitors Other (See Comments)     Statin intolerance. Muscle weakness    Wellbutrin [bupropion hcl] Other (See Comments)     Abdominal pain        Current Facility-Administered Medications on File Prior to Encounter   Medication    [COMPLETED] magnesium sulfate in dextrose IVPB (premix) 1 g    [DISCONTINUED] acetaminophen tablet 650 mg    [DISCONTINUED] amiodarone 360 mg/200 mL (1.8 mg/mL) infusion    [DISCONTINUED] amiodarone tablet 200 mg    [DISCONTINUED] amiodarone tablet 200 mg    [DISCONTINUED] aspirin chewable tablet 81 mg    [DISCONTINUED] butalbital-acetaminophen-caffeine -40 mg per tablet 1 tablet    [DISCONTINUED] enoxaparin injection 40 mg    [DISCONTINUED] furosemide injection 40 mg    [DISCONTINUED] LIDOcaine 4 mg/mL in dextrose 5% infusion    [DISCONTINUED] lisinopriL tablet 5 mg    [DISCONTINUED] LORazepam tablet 0.5 mg    [DISCONTINUED] magnesium oxide tablet 400 mg    [DISCONTINUED] magnesium oxide tablet 800 mg    [DISCONTINUED] magnesium oxide tablet 800 mg    [DISCONTINUED] melatonin tablet 9 mg    [DISCONTINUED] metoprolol succinate (TOPROL-XL) 24 hr tablet 25 mg    [DISCONTINUED] nitroGLYCERIN SL tablet 0.4 mg    [DISCONTINUED] ondansetron injection 4 mg    [DISCONTINUED] pantoprazole EC tablet 40 mg    [DISCONTINUED] polyethylene glycol packet 17 g    [DISCONTINUED] potassium bicarbonate disintegrating tablet 35 mEq    [DISCONTINUED] potassium bicarbonate disintegrating tablet 50 mEq    [DISCONTINUED] potassium bicarbonate disintegrating tablet 60 mEq    [DISCONTINUED] potassium chloride CR capsule 10 mEq    [DISCONTINUED] sertraline tablet 50 mg    [DISCONTINUED] sodium chloride 0.9% flush 2 mL    [DISCONTINUED] spironolactone tablet 25 mg     Current Outpatient Medications on File Prior to Encounter   Medication Sig    amiodarone (PACERONE) 200 MG Tab Take 1 tablet (200 mg total) by mouth 3  (three) times daily.    butalbital-acetaminophen-caffeine -40 mg (FIORICET, ESGIC) -40 mg per tablet Take 1 tablet by mouth every 4 (four) hours as needed for Headaches.    furosemide (LASIX) 40 MG tablet Take 1 tablet (40 mg total) by mouth once daily.    lisinopriL (PRINIVIL,ZESTRIL) 20 MG tablet Take 1 tablet (20 mg total) by mouth once daily.    metoprolol succinate (TOPROL-XL) 50 MG 24 hr tablet Take 1 tablet (50 mg total) by mouth once daily.    nitroGLYCERIN (NITROSTAT) 0.4 MG SL tablet Place 1 tablet (0.4 mg total) under the tongue every 5 (five) minutes as needed for Chest pain.    pantoprazole (PROTONIX) 40 MG tablet Take 40 mg by mouth once daily.    potassium chloride (KLOR-CON) 10 MEQ TbSR Take 1 tablet (10 mEq total) by mouth every other day.    spironolactone (ALDACTONE) 25 MG tablet Take 1 tablet (25 mg total) by mouth once daily.    aspirin 81 MG Chew Take 1 tablet (81 mg total) by mouth once daily.    LORazepam (ATIVAN) 0.5 MG tablet Take 1 tablet (0.5 mg total) by mouth every 8 (eight) hours as needed for Anxiety.    tamsulosin (FLOMAX) 0.4 mg Cap Take 1 capsule by mouth once daily.     Family History       Problem Relation (Age of Onset)    Fibromyalgia Mother    Heart disease Father    Hyperlipidemia Father          Tobacco Use    Smoking status: Every Day     Packs/day: 1.00     Years: 40.00     Pack years: 40.00     Types: Cigarettes    Smokeless tobacco: Never   Substance and Sexual Activity    Alcohol use: Yes     Comment: 12 pack beer daily    Drug use: No    Sexual activity: Not on file     Review of Systems   Constitutional: Positive for malaise/fatigue.   Objective:     Vital Signs (Most Recent):  Temp: 97.9 °F (36.6 °C) (04/23/23 0700)  Pulse: (!) 52 (04/23/23 0900)  Resp: 20 (04/23/23 0900)  BP: 114/70 (04/23/23 0900)  SpO2: 95 % (04/23/23 0900)   Vital Signs (24h Range):  Temp:  [97.6 °F (36.4 °C)-98 °F (36.7 °C)] 97.9 °F (36.6 °C)  Pulse:  [] 52  Resp:  [11-26]  20  SpO2:  [94 %-97 %] 95 %  BP: ()/(52-74) 114/70       Weight: 92.1 kg (203 lb 0.7 oz)  Body mass index is 26.79 kg/m².    SpO2: 95 %       Physical Exam  Constitutional:       General: He is not in acute distress.     Appearance: Normal appearance. He is not ill-appearing.   HENT:      Head: Normocephalic and atraumatic.      Nose: No congestion.      Mouth/Throat:      Mouth: Mucous membranes are moist.   Eyes:      Conjunctiva/sclera: Conjunctivae normal.   Cardiovascular:      Rate and Rhythm: Normal rate and regular rhythm.      Pulses: Normal pulses.   Pulmonary:      Effort: Pulmonary effort is normal. No respiratory distress.   Abdominal:      General: Abdomen is flat. There is no distension.      Palpations: Abdomen is soft.   Musculoskeletal:      Cervical back: Normal range of motion.      Right lower leg: No edema.      Left lower leg: No edema.   Skin:     Capillary Refill: Capillary refill takes less than 2 seconds.      Findings: No rash.   Neurological:      Mental Status: He is alert and oriented to person, place, and time.   Psychiatric:         Mood and Affect: Mood normal.       Significant Labs: All pertinent lab results from the last 24 hours have been reviewed.                  Assessment and Plan:     * Ventricular tachycardia  Patient transferred for MMVT. Interrogation with episode of VT approx 1 week ago which was successfully terminated with ICD discharge. Patient appeared to have been in slow MMVT earlier tonight, found when doing bedside rounds. He was successful paced to NSR multiple times. Reprogrammed to VT therapy zone of 115 with ATP followed by shocks. Rebolused amiodarone and increased rate to 1, increased lidocaine to 2.     Recs  - likely scar mediated   - continue amiodarone at 1, lidocaine at 2  - device reprogrammed for lower VT zone, formal device interrogation ordered  - continue BB, uptirate as tolerated           Gualberto Andino MD  Cardiac Electrophysiology  [facility] - Cardiac Intensive [facility]

## 2023-04-23 NOTE — EICU
Intervention Initiated From:  Bedside    Florian intervened regarding:  Other Possible cardioversion    B/P 94/70, , RR 23, Sats 94%  Amiodarone Bolus in progress  Amio 1mg/min infusing  Liodocaine 2mg/min infusing      Gualberto Andino M.D. at bedside  Collette Morales R.N. at bedside

## 2023-04-24 ENCOUNTER — ANESTHESIA EVENT (OUTPATIENT)
Dept: MEDSURG UNIT | Facility: HOSPITAL | Age: 68
DRG: 274 | End: 2023-04-24
Payer: MEDICARE

## 2023-04-24 ENCOUNTER — ANESTHESIA (OUTPATIENT)
Dept: MEDSURG UNIT | Facility: HOSPITAL | Age: 68
DRG: 274 | End: 2023-04-24
Payer: MEDICARE

## 2023-04-24 LAB
ALBUMIN SERPL BCP-MCNC: 3.6 G/DL (ref 3.5–5.2)
ALP SERPL-CCNC: 89 U/L (ref 55–135)
ALT SERPL W/O P-5'-P-CCNC: 16 U/L (ref 10–44)
ANION GAP SERPL CALC-SCNC: 10 MMOL/L (ref 8–16)
ANION GAP SERPL CALC-SCNC: 10 MMOL/L (ref 8–16)
ASCENDING AORTA: 3.13 CM
AST SERPL-CCNC: 14 U/L (ref 10–40)
AV INDEX (PROSTH): 0.46
AV MEAN GRADIENT: 5 MMHG
AV PEAK GRADIENT: 10 MMHG
AV VALVE AREA: 2.63 CM2
AV VELOCITY RATIO: 0.52
BASOPHILS # BLD AUTO: 0.03 K/UL (ref 0–0.2)
BASOPHILS NFR BLD: 0.3 % (ref 0–1.9)
BILIRUB SERPL-MCNC: 0.3 MG/DL (ref 0.1–1)
BSA FOR ECHO PROCEDURE: 2.18 M2
BUN SERPL-MCNC: 18 MG/DL (ref 8–23)
BUN SERPL-MCNC: 19 MG/DL (ref 8–23)
CALCIUM SERPL-MCNC: 9.1 MG/DL (ref 8.7–10.5)
CALCIUM SERPL-MCNC: 9.2 MG/DL (ref 8.7–10.5)
CHLORIDE SERPL-SCNC: 98 MMOL/L (ref 95–110)
CHLORIDE SERPL-SCNC: 99 MMOL/L (ref 95–110)
CO2 SERPL-SCNC: 24 MMOL/L (ref 23–29)
CO2 SERPL-SCNC: 24 MMOL/L (ref 23–29)
CREAT SERPL-MCNC: 0.9 MG/DL (ref 0.5–1.4)
CREAT SERPL-MCNC: 0.9 MG/DL (ref 0.5–1.4)
CV ECHO LV RWT: 0.35 CM
DIFFERENTIAL METHOD: ABNORMAL
DOP CALC AO PEAK VEL: 1.56 M/S
DOP CALC AO VTI: 33.4 CM
DOP CALC LVOT AREA: 5.7 CM2
DOP CALC LVOT DIAMETER: 2.7 CM
DOP CALC LVOT PEAK VEL: 0.81 M/S
DOP CALC LVOT STROKE VOLUME: 87.79 CM3
DOP CALCLVOT PEAK VEL VTI: 15.34 CM
E/E' RATIO: 19.69 M/S
ECHO LV POSTERIOR WALL: 1.08 CM (ref 0.6–1.1)
EJECTION FRACTION: 35 %
EOSINOPHIL # BLD AUTO: 0.1 K/UL (ref 0–0.5)
EOSINOPHIL NFR BLD: 0.6 % (ref 0–8)
ERYTHROCYTE [DISTWIDTH] IN BLOOD BY AUTOMATED COUNT: 14.7 % (ref 11.5–14.5)
EST. GFR  (NO RACE VARIABLE): >60 ML/MIN/1.73 M^2
EST. GFR  (NO RACE VARIABLE): >60 ML/MIN/1.73 M^2
FRACTIONAL SHORTENING: 19 % (ref 28–44)
GLUCOSE SERPL-MCNC: 111 MG/DL (ref 70–110)
GLUCOSE SERPL-MCNC: 125 MG/DL (ref 70–110)
HCT VFR BLD AUTO: 40.4 % (ref 40–54)
HGB BLD-MCNC: 13 G/DL (ref 14–18)
IMM GRANULOCYTES # BLD AUTO: 0.03 K/UL (ref 0–0.04)
IMM GRANULOCYTES NFR BLD AUTO: 0.3 % (ref 0–0.5)
INTERVENTRICULAR SEPTUM: 0.84 CM (ref 0.6–1.1)
IVRT: 79.92 MSEC
LA MAJOR: 7.03 CM
LA MINOR: 6.84 CM
LA WIDTH: 5.26 CM
LEFT ATRIUM SIZE: 5.05 CM
LEFT ATRIUM VOLUME INDEX MOD: 44.8 ML/M2
LEFT ATRIUM VOLUME INDEX: 72.1 ML/M2
LEFT ATRIUM VOLUME MOD: 97.25 CM3
LEFT ATRIUM VOLUME: 156.55 CM3
LEFT INTERNAL DIMENSION IN SYSTOLE: 4.95 CM (ref 2.1–4)
LEFT VENTRICLE DIASTOLIC VOLUME INDEX: 85.78 ML/M2
LEFT VENTRICLE DIASTOLIC VOLUME: 186.14 ML
LEFT VENTRICLE MASS INDEX: 111 G/M2
LEFT VENTRICLE SYSTOLIC VOLUME INDEX: 53.3 ML/M2
LEFT VENTRICLE SYSTOLIC VOLUME: 115.71 ML
LEFT VENTRICULAR INTERNAL DIMENSION IN DIASTOLE: 6.09 CM (ref 3.5–6)
LEFT VENTRICULAR MASS: 240.26 G
LV LATERAL E/E' RATIO: 16 M/S
LV SEPTAL E/E' RATIO: 25.6 M/S
LYMPHOCYTES # BLD AUTO: 1.1 K/UL (ref 1–4.8)
LYMPHOCYTES NFR BLD: 13.1 % (ref 18–48)
MAGNESIUM SERPL-MCNC: 2 MG/DL (ref 1.6–2.6)
MAGNESIUM SERPL-MCNC: 2.1 MG/DL (ref 1.6–2.6)
MCH RBC QN AUTO: 30.2 PG (ref 27–31)
MCHC RBC AUTO-ENTMCNC: 32.2 G/DL (ref 32–36)
MCV RBC AUTO: 94 FL (ref 82–98)
MONOCYTES # BLD AUTO: 0.6 K/UL (ref 0.3–1)
MONOCYTES NFR BLD: 7.1 % (ref 4–15)
MV PEAK E VEL: 1.28 M/S
NEUTROPHILS # BLD AUTO: 6.8 K/UL (ref 1.8–7.7)
NEUTROPHILS NFR BLD: 78.6 % (ref 38–73)
NRBC BLD-RTO: 0 /100 WBC
PISA TR MAX VEL: 3.63 M/S
PLATELET # BLD AUTO: 191 K/UL (ref 150–450)
PMV BLD AUTO: 11.5 FL (ref 9.2–12.9)
POC ACTIVATED CLOTTING TIME K: 251 SEC (ref 74–137)
POC ACTIVATED CLOTTING TIME K: 287 SEC (ref 74–137)
POC ACTIVATED CLOTTING TIME K: 317 SEC (ref 74–137)
POC ACTIVATED CLOTTING TIME K: 407 SEC (ref 74–137)
POC ACTIVATED CLOTTING TIME K: 420 SEC (ref 74–137)
POC ACTIVATED CLOTTING TIME K: 450 SEC (ref 74–137)
POCT GLUCOSE: 87 MG/DL (ref 70–110)
POCT GLUCOSE: 97 MG/DL (ref 70–110)
POTASSIUM SERPL-SCNC: 4 MMOL/L (ref 3.5–5.1)
POTASSIUM SERPL-SCNC: 4.1 MMOL/L (ref 3.5–5.1)
PROT SERPL-MCNC: 7.5 G/DL (ref 6–8.4)
QEF: 33 %
RA MAJOR: 5.99 CM
RA PRESSURE: 15 MMHG
RA WIDTH: 4.67 CM
RBC # BLD AUTO: 4.31 M/UL (ref 4.6–6.2)
RIGHT VENTRICULAR END-DIASTOLIC DIMENSION: 3.73 CM
RV TISSUE DOPPLER FREE WALL SYSTOLIC VELOCITY 1 (APICAL 4 CHAMBER VIEW): 8.16 CM/S
SAMPLE: ABNORMAL
SINUS: 3.81 CM
SODIUM SERPL-SCNC: 132 MMOL/L (ref 136–145)
SODIUM SERPL-SCNC: 133 MMOL/L (ref 136–145)
STJ: 3.07 CM
TDI LATERAL: 0.08 M/S
TDI SEPTAL: 0.05 M/S
TDI: 0.07 M/S
TR MAX PG: 53 MMHG
TRICUSPID ANNULAR PLANE SYSTOLIC EXCURSION: 1.32 CM
TV REST PULMONARY ARTERY PRESSURE: 68 MMHG
WBC # BLD AUTO: 8.68 K/UL (ref 3.9–12.7)

## 2023-04-24 PROCEDURE — 63600175 PHARM REV CODE 636 W HCPCS: Performed by: NURSE ANESTHETIST, CERTIFIED REGISTERED

## 2023-04-24 PROCEDURE — 20000000 HC ICU ROOM

## 2023-04-24 PROCEDURE — 63600175 PHARM REV CODE 636 W HCPCS: Performed by: INTERNAL MEDICINE

## 2023-04-24 PROCEDURE — 27201423 OPTIME MED/SURG SUP & DEVICES STERILE SUPPLY: Performed by: INTERNAL MEDICINE

## 2023-04-24 PROCEDURE — 37000009 HC ANESTHESIA EA ADD 15 MINS: Performed by: INTERNAL MEDICINE

## 2023-04-24 PROCEDURE — 25000003 PHARM REV CODE 250: Performed by: NURSE ANESTHETIST, CERTIFIED REGISTERED

## 2023-04-24 PROCEDURE — D9220A PRA ANESTHESIA: Mod: ANES,,, | Performed by: STUDENT IN AN ORGANIZED HEALTH CARE EDUCATION/TRAINING PROGRAM

## 2023-04-24 PROCEDURE — 25500020 PHARM REV CODE 255: Performed by: INTERNAL MEDICINE

## 2023-04-24 PROCEDURE — 83735 ASSAY OF MAGNESIUM: CPT | Performed by: INTERNAL MEDICINE

## 2023-04-24 PROCEDURE — D9220A PRA ANESTHESIA: ICD-10-PCS | Mod: CRNA,,, | Performed by: NURSE ANESTHETIST, CERTIFIED REGISTERED

## 2023-04-24 PROCEDURE — 83735 ASSAY OF MAGNESIUM: CPT | Mod: 91 | Performed by: STUDENT IN AN ORGANIZED HEALTH CARE EDUCATION/TRAINING PROGRAM

## 2023-04-24 PROCEDURE — 94761 N-INVAS EAR/PLS OXIMETRY MLT: CPT

## 2023-04-24 PROCEDURE — 36620 INSERTION CATHETER ARTERY: CPT | Mod: 59,,, | Performed by: STUDENT IN AN ORGANIZED HEALTH CARE EDUCATION/TRAINING PROGRAM

## 2023-04-24 PROCEDURE — C1753 CATH, INTRAVAS ULTRASOUND: HCPCS | Performed by: INTERNAL MEDICINE

## 2023-04-24 PROCEDURE — 93654 COMPRE EP EVAL TX VT: CPT | Mod: 22,GC,, | Performed by: INTERNAL MEDICINE

## 2023-04-24 PROCEDURE — 93462 PR LEFT HEART CATH BY TRANSEPTAL PUNCTURE: ICD-10-PCS | Mod: GC,,, | Performed by: INTERNAL MEDICINE

## 2023-04-24 PROCEDURE — 99900035 HC TECH TIME PER 15 MIN (STAT)

## 2023-04-24 PROCEDURE — D9220A PRA ANESTHESIA: ICD-10-PCS | Mod: ANES,,, | Performed by: STUDENT IN AN ORGANIZED HEALTH CARE EDUCATION/TRAINING PROGRAM

## 2023-04-24 PROCEDURE — 99291 PR CRITICAL CARE, E/M 30-74 MINUTES: ICD-10-PCS | Mod: GC,,, | Performed by: INTERNAL MEDICINE

## 2023-04-24 PROCEDURE — 93662 INTRACARDIAC ECG (ICE): CPT | Performed by: INTERNAL MEDICINE

## 2023-04-24 PROCEDURE — D9220A PRA ANESTHESIA: Mod: CRNA,,, | Performed by: NURSE ANESTHETIST, CERTIFIED REGISTERED

## 2023-04-24 PROCEDURE — 80176 ASSAY OF LIDOCAINE: CPT | Performed by: STUDENT IN AN ORGANIZED HEALTH CARE EDUCATION/TRAINING PROGRAM

## 2023-04-24 PROCEDURE — 93654 COMPRE EP EVAL TX VT: CPT | Performed by: INTERNAL MEDICINE

## 2023-04-24 PROCEDURE — C1732 CATH, EP, DIAG/ABL, 3D/VECT: HCPCS | Performed by: INTERNAL MEDICINE

## 2023-04-24 PROCEDURE — 93010 EKG 12-LEAD: ICD-10-PCS | Mod: ,,, | Performed by: INTERNAL MEDICINE

## 2023-04-24 PROCEDURE — 93010 ELECTROCARDIOGRAM REPORT: CPT | Mod: ,,, | Performed by: INTERNAL MEDICINE

## 2023-04-24 PROCEDURE — 37000008 HC ANESTHESIA 1ST 15 MINUTES: Performed by: INTERNAL MEDICINE

## 2023-04-24 PROCEDURE — 25000003 PHARM REV CODE 250: Performed by: STUDENT IN AN ORGANIZED HEALTH CARE EDUCATION/TRAINING PROGRAM

## 2023-04-24 PROCEDURE — 93662 PR INTRACARD ECHO, THER/DX INTERVENT: ICD-10-PCS | Mod: 26,GC,, | Performed by: INTERNAL MEDICINE

## 2023-04-24 PROCEDURE — 93462 L HRT CATH TRNSPTL PUNCTURE: CPT | Mod: GC,,, | Performed by: INTERNAL MEDICINE

## 2023-04-24 PROCEDURE — 80048 BASIC METABOLIC PNL TOTAL CA: CPT | Mod: XB | Performed by: STUDENT IN AN ORGANIZED HEALTH CARE EDUCATION/TRAINING PROGRAM

## 2023-04-24 PROCEDURE — 27000221 HC OXYGEN, UP TO 24 HOURS

## 2023-04-24 PROCEDURE — 63600175 PHARM REV CODE 636 W HCPCS: Performed by: STUDENT IN AN ORGANIZED HEALTH CARE EDUCATION/TRAINING PROGRAM

## 2023-04-24 PROCEDURE — C1730 CATH, EP, 19 OR FEW ELECT: HCPCS | Performed by: INTERNAL MEDICINE

## 2023-04-24 PROCEDURE — 36620 ARTERIAL: ICD-10-PCS | Mod: 59,,, | Performed by: STUDENT IN AN ORGANIZED HEALTH CARE EDUCATION/TRAINING PROGRAM

## 2023-04-24 PROCEDURE — 51702 INSERT TEMP BLADDER CATH: CPT

## 2023-04-24 PROCEDURE — 93662 INTRACARDIAC ECG (ICE): CPT | Mod: 26,GC,, | Performed by: INTERNAL MEDICINE

## 2023-04-24 PROCEDURE — 80053 COMPREHEN METABOLIC PANEL: CPT | Performed by: INTERNAL MEDICINE

## 2023-04-24 PROCEDURE — 25000003 PHARM REV CODE 250: Performed by: INTERNAL MEDICINE

## 2023-04-24 PROCEDURE — 25000003 PHARM REV CODE 250

## 2023-04-24 PROCEDURE — 93462 L HRT CATH TRNSPTL PUNCTURE: CPT | Performed by: INTERNAL MEDICINE

## 2023-04-24 PROCEDURE — 99291 CRITICAL CARE FIRST HOUR: CPT | Mod: GC,,, | Performed by: INTERNAL MEDICINE

## 2023-04-24 PROCEDURE — 93005 ELECTROCARDIOGRAM TRACING: CPT

## 2023-04-24 PROCEDURE — 85025 COMPLETE CBC W/AUTO DIFF WBC: CPT | Performed by: INTERNAL MEDICINE

## 2023-04-24 PROCEDURE — C1894 INTRO/SHEATH, NON-LASER: HCPCS | Performed by: INTERNAL MEDICINE

## 2023-04-24 PROCEDURE — 93654 PR ELECTROPHYS EVAL, COMPREHEN, W/VENTRIC TACHYCARD/VENTRIC ECTOPY TRMT: ICD-10-PCS | Mod: 22,GC,, | Performed by: INTERNAL MEDICINE

## 2023-04-24 RX ORDER — PROPOFOL 10 MG/ML
VIAL (ML) INTRAVENOUS
Status: DISCONTINUED | OUTPATIENT
Start: 2023-04-24 | End: 2023-04-24

## 2023-04-24 RX ORDER — EPHEDRINE SULFATE 50 MG/ML
INJECTION, SOLUTION INTRAVENOUS
Status: DISCONTINUED | OUTPATIENT
Start: 2023-04-24 | End: 2023-04-24

## 2023-04-24 RX ORDER — PROTAMINE SULFATE 10 MG/ML
INJECTION, SOLUTION INTRAVENOUS
Status: DISCONTINUED | OUTPATIENT
Start: 2023-04-24 | End: 2023-04-24

## 2023-04-24 RX ORDER — FENTANYL CITRATE 50 UG/ML
INJECTION, SOLUTION INTRAMUSCULAR; INTRAVENOUS
Status: DISCONTINUED | OUTPATIENT
Start: 2023-04-24 | End: 2023-04-24

## 2023-04-24 RX ORDER — HEPARIN SOD,PORCINE/0.9 % NACL 1000/500ML
INTRAVENOUS SOLUTION INTRAVENOUS
Status: DISCONTINUED | OUTPATIENT
Start: 2023-04-24 | End: 2023-04-26 | Stop reason: HOSPADM

## 2023-04-24 RX ORDER — CEFAZOLIN SODIUM 1 G/3ML
INJECTION, POWDER, FOR SOLUTION INTRAMUSCULAR; INTRAVENOUS
Status: DISCONTINUED | OUTPATIENT
Start: 2023-04-24 | End: 2023-04-24

## 2023-04-24 RX ORDER — ROCURONIUM BROMIDE 10 MG/ML
INJECTION, SOLUTION INTRAVENOUS
Status: DISCONTINUED | OUTPATIENT
Start: 2023-04-24 | End: 2023-04-24

## 2023-04-24 RX ORDER — HEPARIN SODIUM 10000 [USP'U]/100ML
INJECTION, SOLUTION INTRAVENOUS CONTINUOUS PRN
Status: DISCONTINUED | OUTPATIENT
Start: 2023-04-24 | End: 2023-04-24

## 2023-04-24 RX ORDER — HEPARIN SODIUM 1000 [USP'U]/ML
INJECTION, SOLUTION INTRAVENOUS; SUBCUTANEOUS
Status: DISCONTINUED | OUTPATIENT
Start: 2023-04-24 | End: 2023-04-24

## 2023-04-24 RX ORDER — POTASSIUM CHLORIDE 20 MEQ/1
40 TABLET, EXTENDED RELEASE ORAL ONCE
Status: DISCONTINUED | OUTPATIENT
Start: 2023-04-24 | End: 2023-04-26 | Stop reason: HOSPADM

## 2023-04-24 RX ORDER — ONDANSETRON 2 MG/ML
INJECTION INTRAMUSCULAR; INTRAVENOUS
Status: DISCONTINUED | OUTPATIENT
Start: 2023-04-24 | End: 2023-04-24

## 2023-04-24 RX ORDER — LIDOCAINE HYDROCHLORIDE 20 MG/ML
INJECTION, SOLUTION INFILTRATION; PERINEURAL
Status: DISCONTINUED | OUTPATIENT
Start: 2023-04-24 | End: 2023-04-26 | Stop reason: HOSPADM

## 2023-04-24 RX ORDER — ETOMIDATE 2 MG/ML
INJECTION INTRAVENOUS
Status: DISCONTINUED | OUTPATIENT
Start: 2023-04-24 | End: 2023-04-24

## 2023-04-24 RX ADMIN — AMIODARONE HYDROCHLORIDE 1 MG/MIN: 1.8 INJECTION, SOLUTION INTRAVENOUS at 05:04

## 2023-04-24 RX ADMIN — LORAZEPAM 0.5 MG: 0.5 TABLET ORAL at 05:04

## 2023-04-24 RX ADMIN — ROCURONIUM BROMIDE 50 MG: 10 INJECTION INTRAVENOUS at 11:04

## 2023-04-24 RX ADMIN — FENTANYL CITRATE 50 MCG: 50 INJECTION, SOLUTION INTRAMUSCULAR; INTRAVENOUS at 02:04

## 2023-04-24 RX ADMIN — ETOMIDATE 10 MG: 2 INJECTION INTRAVENOUS at 11:04

## 2023-04-24 RX ADMIN — MUPIROCIN: 20 OINTMENT TOPICAL at 08:04

## 2023-04-24 RX ADMIN — FUROSEMIDE 40 MG: 10 INJECTION, SOLUTION INTRAMUSCULAR; INTRAVENOUS at 08:04

## 2023-04-24 RX ADMIN — ONDANSETRON 4 MG: 2 INJECTION INTRAMUSCULAR; INTRAVENOUS at 12:04

## 2023-04-24 RX ADMIN — PROMETHAZINE HYDROCHLORIDE 12.5 MG: 12.5 TABLET ORAL at 08:04

## 2023-04-24 RX ADMIN — ROCURONIUM BROMIDE 30 MG: 10 INJECTION INTRAVENOUS at 01:04

## 2023-04-24 RX ADMIN — AMIODARONE HYDROCHLORIDE 150 MG: 1.5 INJECTION, SOLUTION INTRAVENOUS at 12:04

## 2023-04-24 RX ADMIN — ASPIRIN 81 MG CHEWABLE TABLET 81 MG: 81 TABLET CHEWABLE at 08:04

## 2023-04-24 RX ADMIN — AMIODARONE HYDROCHLORIDE 1 MG/MIN: 1.8 INJECTION, SOLUTION INTRAVENOUS at 03:04

## 2023-04-24 RX ADMIN — POTASSIUM CHLORIDE 10 MEQ: 10 CAPSULE, COATED, EXTENDED RELEASE ORAL at 08:04

## 2023-04-24 RX ADMIN — Medication 9 MG: at 08:04

## 2023-04-24 RX ADMIN — EPHEDRINE SULFATE 5 MG: 50 INJECTION INTRAMUSCULAR; INTRAVENOUS; SUBCUTANEOUS at 12:04

## 2023-04-24 RX ADMIN — PANTOPRAZOLE SODIUM 40 MG: 40 TABLET, DELAYED RELEASE ORAL at 06:04

## 2023-04-24 RX ADMIN — HEPARIN SODIUM 5500 UNITS: 1000 INJECTION, SOLUTION INTRAVENOUS; SUBCUTANEOUS at 01:04

## 2023-04-24 RX ADMIN — FENTANYL CITRATE 25 MCG: 50 INJECTION, SOLUTION INTRAMUSCULAR; INTRAVENOUS at 11:04

## 2023-04-24 RX ADMIN — ENOXAPARIN SODIUM 40 MG: 40 INJECTION SUBCUTANEOUS at 05:04

## 2023-04-24 RX ADMIN — LISINOPRIL 5 MG: 5 TABLET ORAL at 08:04

## 2023-04-24 RX ADMIN — PROPOFOL 50 MG: 10 INJECTION, EMULSION INTRAVENOUS at 01:04

## 2023-04-24 RX ADMIN — ROCURONIUM BROMIDE 20 MG: 10 INJECTION INTRAVENOUS at 01:04

## 2023-04-24 RX ADMIN — HUMAN ALBUMIN MICROSPHERES AND PERFLUTREN 0.11 MG: 10; .22 INJECTION, SOLUTION INTRAVENOUS at 07:04

## 2023-04-24 RX ADMIN — ONDANSETRON 4 MG: 2 INJECTION INTRAMUSCULAR; INTRAVENOUS at 02:04

## 2023-04-24 RX ADMIN — METOPROLOL SUCCINATE 25 MG: 25 TABLET, EXTENDED RELEASE ORAL at 08:04

## 2023-04-24 RX ADMIN — SERTRALINE HYDROCHLORIDE 50 MG: 50 TABLET ORAL at 08:04

## 2023-04-24 RX ADMIN — SODIUM CHLORIDE 0.4 MCG/KG/MIN: 9 INJECTION, SOLUTION INTRAVENOUS at 12:04

## 2023-04-24 RX ADMIN — HEPARIN SODIUM 12 UNITS/KG/HR: 10000 INJECTION, SOLUTION INTRAVENOUS at 12:04

## 2023-04-24 RX ADMIN — PROTAMINE SULFATE 70 MG: 10 INJECTION, SOLUTION INTRAVENOUS at 02:04

## 2023-04-24 RX ADMIN — SODIUM CHLORIDE: 9 INJECTION, SOLUTION INTRAVENOUS at 11:04

## 2023-04-24 RX ADMIN — LIDOCAINE HYDROCHLORIDE 100 MG: 20 INJECTION INTRAVENOUS at 12:04

## 2023-04-24 RX ADMIN — SPIRONOLACTONE 25 MG: 25 TABLET, FILM COATED ORAL at 08:04

## 2023-04-24 RX ADMIN — ACETAMINOPHEN 650 MG: 325 TABLET ORAL at 05:04

## 2023-04-24 RX ADMIN — PROPOFOL 40 MG: 10 INJECTION, EMULSION INTRAVENOUS at 11:04

## 2023-04-24 RX ADMIN — SUGAMMADEX 200 MG: 100 INJECTION, SOLUTION INTRAVENOUS at 02:04

## 2023-04-24 RX ADMIN — HEPARIN SODIUM 5500 UNITS: 1000 INJECTION, SOLUTION INTRAVENOUS; SUBCUTANEOUS at 12:04

## 2023-04-24 RX ADMIN — ETOMIDATE 4 MG: 2 INJECTION INTRAVENOUS at 11:04

## 2023-04-24 RX ADMIN — ACETAMINOPHEN 650 MG: 325 TABLET ORAL at 08:04

## 2023-04-24 RX ADMIN — CEFAZOLIN 2 G: 330 INJECTION, POWDER, FOR SOLUTION INTRAMUSCULAR; INTRAVENOUS at 11:04

## 2023-04-24 RX ADMIN — HEPARIN SODIUM 14000 UNITS: 1000 INJECTION, SOLUTION INTRAVENOUS; SUBCUTANEOUS at 12:04

## 2023-04-24 RX ADMIN — Medication 400 MG: at 08:04

## 2023-04-24 RX ADMIN — AMIODARONE HYDROCHLORIDE 1 MG/MIN: 1.8 INJECTION, SOLUTION INTRAVENOUS at 10:04

## 2023-04-24 NOTE — PLAN OF CARE
Care Plan    POC reviewed with Brandon Rivas and family at 0300. Pt verbalized understanding. Questions and concerns addressed. No acute events overnight. Pt progressing toward goals. Will continue to monitor. See below and flowsheets for full assessment and VS info.     - NPO since midnight for procedure today  - vtach to 120 last night, amio bolus x2, lidocaine push x1  - lidocaine gtt decreased to 1 mg  - plan for VT ablation today      Neuro: GCS 15  Afebrile  Some confusion after lidocaine push    CV:   Rhythm: sinus bradycardia  BP goals:   SBP < 180  HR < 110    Ventricular tachycardia episode last night, , BP stable; impending doom, nausea. Amio bolus x2 and lidocaine push x1 given. Converted to sinus michelle. Multiple airstrips captured throughout     Resp: room air      GI/:  1.5 L fluid restriction, cardiac diet   Last Bowel Movement: 04/23/23  Voiding Characteristics: voids spontaneously without difficulty into urinal at bedside  Nausea and vomiting after increase in IV medication.   Zofran given x1    Intake/Output Summary (Last 24 hours) at 4/24/2023 0602  Last data filed at 4/24/2023 0501  Gross per 24 hour   Intake 1666.77 ml   Output 1250 ml   Net 416.77 ml       Labs/Accuchecks:  Recent Labs   Lab 04/24/23  0423   WBC 8.68   RBC 4.31*   HGB 13.0*   HCT 40.4         Recent Labs   Lab 04/24/23  0423   *   K 4.0   CO2 24   CL 99   BUN 18   CREATININE 0.9   ALKPHOS 89   ALT 16   AST 14   BILITOT 0.3      Recent Labs   Lab 04/18/23  0527   INR 1.0   APTT 30.2    No results for input(s): CPK, CPKMB, TROPONINI, MB in the last 168 hours.    Electrolytes: WNL  Accuchecks: ACHS    Gtts:   amiodarone in dextrose 5% 1 mg/min (04/24/23 0315)    LIDOcaine 1 mg/min (04/24/23 0109)       LDA/Wounds:  Lines/Drains/Airways       Peripheral Intravenous Line  Duration                  Peripheral IV - Single Lumen 04/20/23 2200 20 G Distal;Left;Posterior Forearm 3 days         Peripheral IV -  Single Lumen 04/22/23 1245 20 G Left Forearm 1 day                  Wounds: No  Wound care consulted: No

## 2023-04-24 NOTE — ANESTHESIA PREPROCEDURE EVALUATION
04/24/2023  Brandon Rivas is a 67 y.o., male.      Recent TTE   The left ventricle is mildly enlarged with eccentric hypertrophy and moderately decreased systolic function.   There are segmental left ventricular wall motion abnormalities.   The estimated ejection fraction is 35%.   Left ventricular diastolic dysfunction.   Moderate-to-severe mitral regurgitation. EROA 0.25cm^2   Mild to moderate tricuspid regurgitation.   Mild aortic regurgitation.   Moderate left atrial enlargement.   Normal right ventricular size with mildly reduced right ventricular systolic function.   Moderate right atrial enlargement.   The quantitatively derived ejection fraction is 33%.   Elevated central venous pressure (15 mmHg).   The estimated PA systolic pressure is 68 mmHg.   Suspect VT related to apical scar tissue.      Pre-op Assessment    I have reviewed the Patient Summary Reports.     I have reviewed the Nursing Notes. I have reviewed the NPO Status.   I have reviewed the Medications.     Review of Systems  Anesthesia Hx:  Denies Family Hx of Anesthesia complications.   Denies Personal Hx of Anesthesia complications.       Physical Exam  General: Well nourished    Airway:  Mallampati: II   Mouth Opening: Normal  TM Distance: Normal  Tongue: Normal  Neck ROM: Normal ROM    Dental:Any loose and/or missing teeth verified with patient   Chest/Lungs:  Clear to auscultation    Heart:  Rate: Normal  Rhythm: Regular Rhythm  Sounds: Normal    Abdomen:  Normal        Anesthesia Plan  Type of Anesthesia, risks & benefits discussed:    Anesthesia Type: Gen ETT, Gen Supraglottic Airway, Gen Natural Airway, MAC  Intra-op Monitoring Plan: Standard ASA Monitors  Post Op Pain Control Plan: multimodal analgesia and IV/PO Opioids PRN  Induction:  IV  Informed Consent: Informed consent signed with the Patient and all  parties understand the risks and agree with anesthesia plan.  All questions answered.   ASA Score: 4    Ready For Surgery From Anesthesia Perspective.     .

## 2023-04-24 NOTE — NURSING
Sustained vtach noted beginning at 2343. Cardiology called, amio bolus ordered and given. Lidocaine 100mg ordered and given.     Rate decreased from 120 to 114 after pharm interventions, provider aware.

## 2023-04-24 NOTE — SUBJECTIVE & OBJECTIVE
Interval History: Patient with slow VT last night. Was re-bolused with amio and is now in NSR in the 50's. Plan for EP ablation today. TTE with EF 35% and no LV thrombus or aneurysm.     Review of Systems   Constitutional: Positive for malaise/fatigue. Negative for decreased appetite, weight gain and weight loss.   Cardiovascular:  Negative for irregular heartbeat, orthopnea, palpitations and paroxysmal nocturnal dyspnea.   Respiratory:  Positive for shortness of breath. Negative for cough.    Gastrointestinal:  Negative for bloating, diarrhea, nausea and vomiting.   Genitourinary:  Negative for dysuria and hematuria.   Neurological:  Positive for weakness. Negative for focal weakness.   Objective:     Vital Signs (Most Recent):  Temp: 98.2 °F (36.8 °C) (04/24/23 0700)  Pulse: (!) 48 (04/24/23 1000)  Resp: (!) 22 (04/24/23 1000)  BP: 117/60 (04/24/23 1000)  SpO2: 95 % (04/24/23 1000)   Vital Signs (24h Range):  Temp:  [97.8 °F (36.6 °C)-98.2 °F (36.8 °C)] 98.2 °F (36.8 °C)  Pulse:  [] 48  Resp:  [9-34] 22  SpO2:  [92 %-97 %] 95 %  BP: ()/(56-74) 117/60     Weight: 92.1 kg (203 lb)  Body mass index is 26.78 kg/m².     SpO2: 95 %         Intake/Output Summary (Last 24 hours) at 4/24/2023 1113  Last data filed at 4/24/2023 1040  Gross per 24 hour   Intake 1507.11 ml   Output 2800 ml   Net -1292.89 ml       Lines/Drains/Airways       Peripheral Intravenous Line  Duration                  Peripheral IV - Single Lumen 04/20/23 2200 20 G Distal;Left;Posterior Forearm 3 days         Peripheral IV - Single Lumen 04/22/23 1245 20 G Left Forearm 1 day                    Physical Exam  Constitutional:       General: He is not in acute distress.     Appearance: Normal appearance.   HENT:      Mouth/Throat:      Comments: Poor dentition  Eyes:      Extraocular Movements: Extraocular movements intact.      Pupils: Pupils are equal, round, and reactive to light.   Cardiovascular:      Rate and Rhythm: Normal rate and  regular rhythm.      Pulses: Normal pulses.      Heart sounds: Normal heart sounds.   Pulmonary:      Effort: Pulmonary effort is normal.      Breath sounds: Normal breath sounds.   Abdominal:      General: Abdomen is flat. Bowel sounds are normal. There is no distension.      Palpations: Abdomen is soft.      Tenderness: There is no abdominal tenderness. There is no guarding.      Hernia: No hernia is present.   Musculoskeletal:      Right lower leg: No edema.      Left lower leg: No edema.   Skin:     Capillary Refill: Capillary refill takes less than 2 seconds.      Findings: No lesion.   Neurological:      General: No focal deficit present.      Mental Status: He is alert and oriented to person, place, and time.   Psychiatric:         Mood and Affect: Mood is anxious.       Significant Labs: All pertinent lab results from the last 24 hours have been reviewed.    Significant Imaging: All pertinent imaging results from the last 24 hours have been reviewed.

## 2023-04-24 NOTE — ASSESSMENT & PLAN NOTE
67 y.o. male with CAD s/p stent (though patient does not recall), HFrEF (35%) s/p AICD, hyperlipidemia, hypertension, tobacco use who presents for nonsustained VT.  Stress test with  large fixed hypokinetic defect in apex and anterior wall.  Started on amio drip, converted to amio 200 mg TID, however patient continued to have intermittent VT so started on lidocaine drip.  On arrival, patient in VT with HR 130s, spontaneously converted to NSR.  HDS with BP 100s systolic throughout.     - continue amiodarone 1mg gty, restarted on drip given refractory intermittent VT  - continue lidocaine drip at 1mg gtt  - continuous tele  - EP consulted, plan for VT ablation on 4/24  - TTE on 4/24 with no LV thrombus or aneurysm, EF 35%   Peripheral Block    Patient location during procedure: holding area   Block not for primary anesthetic.  Reason for block: at surgeon's request and post-op pain management   Post-op Pain Location: wrist pain   End time: 1/6/2022 11:48 AM    Staffing  Authorizing Provider: Will Stapleton MD  Performing Provider: Will Stapleton MD    Preanesthetic Checklist  Completed: patient identified, IV checked, site marked, risks and benefits discussed, surgical consent, monitors and equipment checked, pre-op evaluation and timeout performed  Peripheral Block  Patient position: sitting  Prep: ChloraPrep  Patient monitoring: heart rate, cardiac monitor, continuous pulse ox and frequent blood pressure checks  Block type: supraclavicular  Laterality: left  Injection technique: single shot  Needle  Needle type: Stimuplex   Needle gauge: 22 G  Needle length: 4 in  Needle localization: ultrasound guidance   -ultrasound image captured on disc.  Assessment  Injection assessment: negative aspiration, negative parasthesia and local visualized surrounding nerve  Paresthesia pain: none  Slow fractionated injection: yes  Pain Tolerance: comfortable throughout block and no complaints  Medications:    Medications: ropivacaine (NAROPIN) injection 0.5%, 20 mL

## 2023-04-24 NOTE — ANESTHESIA PROCEDURE NOTES
Intubation    Date/Time: 4/24/2023 11:45 AM  Performed by: Hien Titus CRNA  Authorized by: Pranay Momin MD     Intubation:     Induction:  Intravenous    Intubated:  Postinduction    Mask Ventilation:  Easy with oral airway    Attempts:  1    Attempted By:  CRNA    Method of Intubation:  Video laryngoscopy    Blade:  Ramos 4    Laryngeal View Grade: Grade IIA - cords partially seen      Difficult Airway Encountered?: No      Complications:  None    Airway Device:  Oral endotracheal tube    Airway Device Size:  7.5    Style/Cuff Inflation:  Cuffed (inflated to minimal occlusive pressure)    Inflation Amount (mL):  8    Tube secured:  23    Secured at:  The lips    Placement Verified By:  Colorimetric ETCO2 device    Complicating Factors:  None    Findings Post-Intubation:  BS equal bilateral and atraumatic/condition of teeth unchanged

## 2023-04-24 NOTE — PROGRESS NOTES
[facility] - [facility]iology  Cardiology  Progress Note    Patient Name: Brandon Rivas  MRN: [id number]  Admission Date: 4/22/2023  Hospital Length of Stay: 2 days  Code Status: Full Code   Attending Physician: Steven Mckeon MD   Primary Care Physician: James Garcia MD  Expected Discharge Date:   Principal Problem:Ventricular tachycardia    Subjective:     Hospital Course:   Admitted to CCU for monomorphic VT.  Patient initially in VT with rates 120s, converted to sinus michelle (HR 50s) on admission.  Reloaded amio IV at 1mg/h and started on a lidocaine infusion.  Went back into VT to 120s, paced out of VT at bedside.  EP consulted, plan for VT ablation on 4/24.       Interval History: Patient with slow VT last night. Was re-bolused with amio and is now in NSR in the 50's. Plan for EP ablation today. TTE with EF 35% and no LV thrombus or aneurysm.     Review of Systems   Constitutional: Positive for malaise/fatigue. Negative for decreased appetite, weight gain and weight loss.   Cardiovascular:  Negative for irregular heartbeat, orthopnea, palpitations and paroxysmal nocturnal dyspnea.   Respiratory:  Positive for shortness of breath. Negative for cough.    Gastrointestinal:  Negative for bloating, diarrhea, nausea and vomiting.   Genitourinary:  Negative for dysuria and hematuria.   Neurological:  Positive for weakness. Negative for focal weakness.   Objective:     Vital Signs (Most Recent):  Temp: 98.2 °F (36.8 °C) (04/24/23 0700)  Pulse: (!) 48 (04/24/23 1000)  Resp: (!) 22 (04/24/23 1000)  BP: 117/60 (04/24/23 1000)  SpO2: 95 % (04/24/23 1000)   Vital Signs (24h Range):  Temp:  [97.8 °F (36.6 °C)-98.2 °F (36.8 °C)] 98.2 °F (36.8 °C)  Pulse:  [] 48  Resp:  [9-34] 22  SpO2:  [92 %-97 %] 95 %  BP: ()/(56-74) 117/60     Weight: 92.1 kg (203 lb)  Body mass index is 26.78 kg/m².     SpO2: 95 %         Intake/Output Summary (Last 24 hours) at 4/24/2023 1113  Last data filed at 4/24/2023  1040  Gross per 24 hour   Intake 1507.11 ml   Output 2800 ml   Net -1292.89 ml       Lines/Drains/Airways       Peripheral Intravenous Line  Duration                  Peripheral IV - Single Lumen 04/20/23 2200 20 G Distal;Left;Posterior Forearm 3 days         Peripheral IV - Single Lumen 04/22/23 1245 20 G Left Forearm 1 day                    Physical Exam  Constitutional:       General: He is not in acute distress.     Appearance: Normal appearance.   HENT:      Mouth/Throat:      Comments: Poor dentition  Eyes:      Extraocular Movements: Extraocular movements intact.      Pupils: Pupils are equal, round, and reactive to light.   Cardiovascular:      Rate and Rhythm: Normal rate and regular rhythm.      Pulses: Normal pulses.      Heart sounds: Normal heart sounds.   Pulmonary:      Effort: Pulmonary effort is normal.      Breath sounds: Normal breath sounds.   Abdominal:      General: Abdomen is flat. Bowel sounds are normal. There is no distension.      Palpations: Abdomen is soft.      Tenderness: There is no abdominal tenderness. There is no guarding.      Hernia: No hernia is present.   Musculoskeletal:      Right lower leg: No edema.      Left lower leg: No edema.   Skin:     Capillary Refill: Capillary refill takes less than 2 seconds.      Findings: No lesion.   Neurological:      General: No focal deficit present.      Mental Status: He is alert and oriented to person, place, and time.   Psychiatric:         Mood and Affect: Mood is anxious.       Significant Labs: All pertinent lab results from the last 24 hours have been reviewed.    Significant Imaging: All pertinent imaging results from the last 24 hours have been reviewed.      Assessment and Plan:     * Ventricular tachycardia  67 y.o. male with CAD s/p stent (though patient does not recall), HFrEF (35%) s/p AICD, hyperlipidemia, hypertension, tobacco use who presents for nonsustained VT.  Stress test with  large fixed hypokinetic defect in apex  and anterior wall.  Started on amio drip, converted to amio 200 mg TID, however patient continued to have intermittent VT so started on lidocaine drip.  On arrival, patient in VT with HR 130s, spontaneously converted to NSR.  HDS with BP 100s systolic throughout.     - continue amiodarone 1mg gty, restarted on drip given refractory intermittent VT  - continue lidocaine drip at 1mg gtt  - continuous tele  - EP consulted, plan for VT ablation on 4/24  - TTE on 4/24 with no LV thrombus or aneurysm, EF 35%    Chronic combined systolic and diastolic heart failure    Lab Results   Component Value Date    TROPONINI 0.033 (H) 12/19/2016     (H) 04/21/2023     Results for orders placed during the hospital encounter of 02/21/23  Echo  Interpretation Summary  · The left ventricle is mildly enlarged with moderate concentric hypertrophy evidence of old apical scar seen on apical four-chamber view  · The estimated ejection fraction is 34%.  · Grade III left ventricular diastolic dysfunction. Mean left atrial pressure 11  · There are segmental left ventricular wall motion abnormalities.  · Atrial fibrillation not observed.  · Normal right ventricular size with mildly reduced right ventricular systolic function.  · Mild-to-moderate aortic regurgitation.  · Severe mitral regurgitation.  · Intermediate central venous pressure (8 mmHg).  · The estimated PA systolic pressure is 30 mmHg. Mild pulmonary hypertension is present    - compensated on exam  - continue lisinopril, metoprolol, spironolactone  - continue asa, statin  - continue lasix 40 mg IV qd  - Cardiac diet, fluid restriction at 1500 cc with strict I/Os and daily standing weights  - Maintain Mg >2, K >4    Tobacco dependence  - patient deferring nicotine patch    Hyperlipidemia  - continue home statin    Hypertension  - continue home antihypertensives        VTE Risk Mitigation (From admission, onward)         Ordered     enoxaparin injection 40 mg  Daily          04/22/23 1410     IP VTE HIGH RISK PATIENT  Once         04/22/23 1410     Place sequential compression device  Until discontinued         04/22/23 1410                Ramesh Rossi MD  Cardiology  [facility] - Cardiology

## 2023-04-24 NOTE — PLAN OF CARE
[facility] - Cardiac Intensive Care  Initial Discharge Assessment       Primary Care Provider: James Garcia MD    Admission Diagnosis: Ventricular tachycardia, re-entry [I47.29]    Admission Date: 4/22/2023  Expected Discharge Date:     Discharge Barriers Identified: None    Payor: MEDICARE / Plan: MEDICARE PART A & B / Product Type: Government /     Extended Emergency Contact Information  Primary Emergency Contact: Jerzy Rivas  Mobile Phone: [phone]  Relation: Brother  Secondary Emergency Contact: Josefina Rivas  Mobile Phone: [phone]  Relation: Sister   needed? No    Discharge Plan A: Home with family  Discharge Plan B: Home with family, [facility] - [place], [place] - [address]  [address] [address]  Phone: [phone] Fax: [phone]    [facility] - [place], [place] - [address]  [address] [place] [address]  Phone: [phone] Fax: [phone]      Initial Assessment (most recent)       Adult Discharge Assessment - 04/24/23 1633          Discharge Assessment    Assessment Type Discharge Planning Assessment     Confirmed/corrected address, phone number and insurance Yes     Confirmed Demographics Correct on Facesheet     Source of Information patient;family   patient only stated name and date of birth , said too tired to talk. got permission to call brother.    If unable to respond/provide information was family/caregiver contacted? Yes     Contact Name/Number Jerzy Rivas (brother) [phone]     Communicated LARY with patient/caregiver Date not available/Unable to determine     Reason For Admission Ventricular Tachycardia     People in Home sibling(s)   Lives with Brother    Facility Arrived From: [facility]     Do you expect to return to your current living situation? Yes     Do you have help at home or someone to help you manage your care at home? Yes     Who are your caregiver(s) and their  phone number(s)? Jerzy Rivas (brother) [phone]     Prior to hospitilization cognitive status: Unable to Assess     Current cognitive status: --   oriented to name and  could not state address    Equipment Currently Used at Home wheelchair     Readmission within 30 days? No     Patient currently being followed by outpatient case management? No     Do you currently have service(s) that help you manage your care at home? Yes     Name and Contact number of agency patient and brother did not remember name . brother stated to call him tomorrow because wants to resume care.     Is the pt/caregiver preference to resume services with current agency Yes     Do you take prescription medications? Yes     Do you have prescription coverage? Yes     Coverage Medicare A & B     Do you have any problems affording any of your prescribed medications? No     Is the patient taking medications as prescribed? yes     Who is going to help you get home at discharge? Jerzy Rivas (brother)      How do you get to doctors appointments? family or friend will provide     Are you on dialysis? No     Do you take coumadin? No     Discharge Plan A Home with family     Discharge Plan B Home with family;Home Health     DME Needed Upon Discharge  none     Discharge Plan discussed with: Patient;Sibling     Name(s) and Number(s) Jerzy Rivas (brother)      Discharge Barriers Identified None        OTHER    Name(s) of People in Home Jerzy Rivas (brother)                    CM went to speak with patient at bedside. Patient able to state name and birthday but stated he was too tired to answer anything else. Got permission to speak with his brother. Spoke with Brother Jerzy Rivas ([phone]) and brother stated patient lives with him in a single story house with a ramp to go up and and the threshold to enter house. Patient lives at [address] [place] [address]. The home is equiped with wide doors and  bathroom set up for Handicap use with grab bar , shower stool , raised toilet . Brother stated he has his mother's 2 rollators and a folding wheelchair if needed. Was not able to state the name of the home health helping his brother but when home can tell me . Will call in the am. Will continue to monitor for discharge needs.     Shemar Manzo RN CM   [phone]

## 2023-04-24 NOTE — BRIEF OP NOTE
: Helder Grace MD  Date of procedure: 4/24/2023  Post-operative Diagnosis: VT    Procedure Performed: Radiofrequency ablation for treatment of VT.     Description of Procedure: The patient was brought to the EP lab in the fasting state. Prepped and draped in sterile fashion. Safety timeout was performed. Sedation administered by anesthesia staff. Ultrasound guided venous access of the bilateral femoral veins was performed. ICE, and RV catheters placed via left femoral vein access. Heparin bolus and continuous infusion per protocol. A single transseptal puncture was performed using combination of fluoroscopic and ICE guidance. Map created. RFA for substrate modification for treatment of VT arising from apical/apical anterior LV. ICE confirmed no significant pericardial effusion.     EBL: <10 mL    Specimens Removed: None  Complications: no immediate    A/P  Suture removal at 4 hours, bed rest 6 hours  Asa 81mg daily for minimum 30 days  Cont amio drip. Stop dedra Jenkins MD PGY-8

## 2023-04-24 NOTE — ANESTHESIA PROCEDURE NOTES
Arterial    Diagnosis: ventricular tachycardia  Doctor requesting consult: LUDWIN HERNÁNDEZ    Patient location during procedure: done in OR  Procedure start time: 4/24/2023 1:48 PM  Timeout: 4/24/2023 1:48 PM  Procedure end time: 4/24/2023 1:48 PM    Staffing  Authorizing Provider: Pranay Momin MD  Performing Provider: Pranay Momin MD    Anesthesiologist was present at the time of the procedure.    Preanesthetic Checklist  Completed: patient identified, IV checked, risks and benefits discussed, surgical consent, monitors and equipment checked, pre-op evaluation, timeout performed and anesthesia consent givenArterial  Skin Prep: chlorhexidine gluconate  Local Infiltration: lidocaine  Orientation: left  Location: radial    Catheter Size: 20 G  Catheter placement by Ultrasound guidance. Heme positive aspiration all ports.   Vessel Caliber: medium, patent, compressibility normal  Vascular Doppler:  not done  Needle advanced into vessel with real time Ultrasound guidance.  Guidewire confirmed in vessel.  Sterile sheath used.Insertion Attempts: 1  Assessment  Dressing: secured with tape and tegaderm  Patient: Tolerated well

## 2023-04-24 NOTE — ASSESSMENT & PLAN NOTE
Lab Results   Component Value Date    TROPONINI 0.033 (H) 12/19/2016     (H) 04/21/2023     Results for orders placed during the hospital encounter of 02/21/23  Echo  Interpretation Summary  · The left ventricle is mildly enlarged with moderate concentric hypertrophy evidence of old apical scar seen on apical four-chamber view  · The estimated ejection fraction is 34%.  · Grade III left ventricular diastolic dysfunction. Mean left atrial pressure 11  · There are segmental left ventricular wall motion abnormalities.  · Atrial fibrillation not observed.  · Normal right ventricular size with mildly reduced right ventricular systolic function.  · Mild-to-moderate aortic regurgitation.  · Severe mitral regurgitation.  · Intermediate central venous pressure (8 mmHg).  · The estimated PA systolic pressure is 30 mmHg. Mild pulmonary hypertension is present    - compensated on exam  - continue lisinopril, metoprolol, spironolactone  - continue asa, statin  - continue lasix 40 mg IV qd  - Cardiac diet, fluid restriction at 1500 cc with strict I/Os and daily standing weights  - Maintain Mg >2, K >4

## 2023-04-24 NOTE — TRANSFER OF CARE
"Anesthesia Transfer of Care Note    Patient: Brandon Rivas    Procedure(s) Performed: Procedure(s) (LRB):  Ablation, Scar VT (N/A)    Patient location: ICU    Anesthesia Type: general    Transport from OR: Transported from OR on room air with adequate spontaneous ventilation    Post pain: adequate analgesia    Post assessment: no apparent anesthetic complications and tolerated procedure well    Post vital signs: stable    Level of consciousness: awake, alert and oriented    Nausea/Vomiting: no nausea/vomiting    Complications: none    Transfer of care protocol was followed      Last vitals:   Visit Vitals  /60 (BP Location: Left arm, Patient Position: Lying)   Pulse (!) 48   Temp 36.8 °C (98.2 °F) (Axillary)   Resp (!) 22   Ht 6' 1" (1.854 m)   Wt 92.1 kg (203 lb)   SpO2 95%   BMI 26.78 kg/m²     "

## 2023-04-25 LAB
ALBUMIN SERPL BCP-MCNC: 3.3 G/DL (ref 3.5–5.2)
ALP SERPL-CCNC: 89 U/L (ref 55–135)
ALT SERPL W/O P-5'-P-CCNC: 16 U/L (ref 10–44)
ANION GAP SERPL CALC-SCNC: 7 MMOL/L (ref 8–16)
AST SERPL-CCNC: 42 U/L (ref 10–40)
BASOPHILS # BLD AUTO: 0.05 K/UL (ref 0–0.2)
BASOPHILS NFR BLD: 0.7 % (ref 0–1.9)
BILIRUB SERPL-MCNC: 0.4 MG/DL (ref 0.1–1)
BUN SERPL-MCNC: 15 MG/DL (ref 8–23)
CALCIUM SERPL-MCNC: 8.9 MG/DL (ref 8.7–10.5)
CHLORIDE SERPL-SCNC: 102 MMOL/L (ref 95–110)
CO2 SERPL-SCNC: 25 MMOL/L (ref 23–29)
CREAT SERPL-MCNC: 0.7 MG/DL (ref 0.5–1.4)
DIFFERENTIAL METHOD: ABNORMAL
EOSINOPHIL # BLD AUTO: 0.1 K/UL (ref 0–0.5)
EOSINOPHIL NFR BLD: 1 % (ref 0–8)
ERYTHROCYTE [DISTWIDTH] IN BLOOD BY AUTOMATED COUNT: 14.6 % (ref 11.5–14.5)
EST. GFR  (NO RACE VARIABLE): >60 ML/MIN/1.73 M^2
GLUCOSE SERPL-MCNC: 93 MG/DL (ref 70–110)
HCT VFR BLD AUTO: 37.5 % (ref 40–54)
HGB BLD-MCNC: 12 G/DL (ref 14–18)
IMM GRANULOCYTES # BLD AUTO: 0.02 K/UL (ref 0–0.04)
IMM GRANULOCYTES NFR BLD AUTO: 0.3 % (ref 0–0.5)
LIDOCAIN SERPL-MCNC: 3.9 MCG/ML (ref 1.5–5)
LYMPHOCYTES # BLD AUTO: 1.4 K/UL (ref 1–4.8)
LYMPHOCYTES NFR BLD: 20.9 % (ref 18–48)
MAGNESIUM SERPL-MCNC: 2 MG/DL (ref 1.6–2.6)
MCH RBC QN AUTO: 30.1 PG (ref 27–31)
MCHC RBC AUTO-ENTMCNC: 32 G/DL (ref 32–36)
MCV RBC AUTO: 94 FL (ref 82–98)
MONOCYTES # BLD AUTO: 0.7 K/UL (ref 0.3–1)
MONOCYTES NFR BLD: 9.9 % (ref 4–15)
NEUTROPHILS # BLD AUTO: 4.5 K/UL (ref 1.8–7.7)
NEUTROPHILS NFR BLD: 67.2 % (ref 38–73)
NRBC BLD-RTO: 0 /100 WBC
PLATELET # BLD AUTO: 177 K/UL (ref 150–450)
PMV BLD AUTO: 11.9 FL (ref 9.2–12.9)
POCT GLUCOSE: 126 MG/DL (ref 70–110)
POCT GLUCOSE: 76 MG/DL (ref 70–110)
POCT GLUCOSE: 88 MG/DL (ref 70–110)
POTASSIUM SERPL-SCNC: 3.9 MMOL/L (ref 3.5–5.1)
PROT SERPL-MCNC: 6.7 G/DL (ref 6–8.4)
RBC # BLD AUTO: 3.99 M/UL (ref 4.6–6.2)
SODIUM SERPL-SCNC: 134 MMOL/L (ref 136–145)
WBC # BLD AUTO: 6.7 K/UL (ref 3.9–12.7)

## 2023-04-25 PROCEDURE — 25000003 PHARM REV CODE 250: Performed by: INTERNAL MEDICINE

## 2023-04-25 PROCEDURE — 83735 ASSAY OF MAGNESIUM: CPT | Performed by: INTERNAL MEDICINE

## 2023-04-25 PROCEDURE — 80053 COMPREHEN METABOLIC PANEL: CPT | Performed by: INTERNAL MEDICINE

## 2023-04-25 PROCEDURE — 25000003 PHARM REV CODE 250: Performed by: STUDENT IN AN ORGANIZED HEALTH CARE EDUCATION/TRAINING PROGRAM

## 2023-04-25 PROCEDURE — 20600001 HC STEP DOWN PRIVATE ROOM

## 2023-04-25 PROCEDURE — 63600175 PHARM REV CODE 636 W HCPCS: Performed by: INTERNAL MEDICINE

## 2023-04-25 PROCEDURE — 27000221 HC OXYGEN, UP TO 24 HOURS

## 2023-04-25 PROCEDURE — 99233 SBSQ HOSP IP/OBS HIGH 50: CPT | Mod: GC,,, | Performed by: INTERNAL MEDICINE

## 2023-04-25 PROCEDURE — 94761 N-INVAS EAR/PLS OXIMETRY MLT: CPT

## 2023-04-25 PROCEDURE — 99233 PR SUBSEQUENT HOSPITAL CARE,LEVL III: ICD-10-PCS | Mod: GC,,, | Performed by: INTERNAL MEDICINE

## 2023-04-25 PROCEDURE — 85025 COMPLETE CBC W/AUTO DIFF WBC: CPT | Performed by: INTERNAL MEDICINE

## 2023-04-25 PROCEDURE — 99900035 HC TECH TIME PER 15 MIN (STAT)

## 2023-04-25 RX ORDER — AMIODARONE HYDROCHLORIDE 200 MG/1
400 TABLET ORAL DAILY
Status: DISCONTINUED | OUTPATIENT
Start: 2023-04-25 | End: 2023-04-26 | Stop reason: HOSPADM

## 2023-04-25 RX ORDER — METOPROLOL SUCCINATE 25 MG/1
25 TABLET, EXTENDED RELEASE ORAL DAILY
Qty: 30 TABLET | Refills: 11 | Status: SHIPPED | OUTPATIENT
Start: 2023-04-26 | End: 2023-04-26 | Stop reason: SDUPTHER

## 2023-04-25 RX ORDER — AMIODARONE HYDROCHLORIDE 200 MG/1
400 TABLET ORAL DAILY
Qty: 60 TABLET | Refills: 11 | Status: SHIPPED | OUTPATIENT
Start: 2023-04-25 | End: 2024-04-24

## 2023-04-25 RX ORDER — LISINOPRIL 5 MG/1
5 TABLET ORAL DAILY
Qty: 90 TABLET | Refills: 3 | Status: SHIPPED | OUTPATIENT
Start: 2023-04-26 | End: 2024-04-25

## 2023-04-25 RX ADMIN — AMIODARONE HYDROCHLORIDE 400 MG: 200 TABLET ORAL at 10:04

## 2023-04-25 RX ADMIN — SPIRONOLACTONE 25 MG: 25 TABLET, FILM COATED ORAL at 09:04

## 2023-04-25 RX ADMIN — FUROSEMIDE 40 MG: 10 INJECTION, SOLUTION INTRAMUSCULAR; INTRAVENOUS at 09:04

## 2023-04-25 RX ADMIN — Medication 400 MG: at 09:04

## 2023-04-25 RX ADMIN — LORAZEPAM 0.5 MG: 0.5 TABLET ORAL at 01:04

## 2023-04-25 RX ADMIN — SERTRALINE HYDROCHLORIDE 50 MG: 50 TABLET ORAL at 08:04

## 2023-04-25 RX ADMIN — MUPIROCIN: 20 OINTMENT TOPICAL at 09:04

## 2023-04-25 RX ADMIN — ACETAMINOPHEN 650 MG: 325 TABLET ORAL at 04:04

## 2023-04-25 RX ADMIN — LORAZEPAM 0.5 MG: 0.5 TABLET ORAL at 10:04

## 2023-04-25 RX ADMIN — LISINOPRIL 5 MG: 5 TABLET ORAL at 09:04

## 2023-04-25 RX ADMIN — ACETAMINOPHEN 650 MG: 325 TABLET ORAL at 09:04

## 2023-04-25 RX ADMIN — ACETAMINOPHEN 650 MG: 325 TABLET ORAL at 08:04

## 2023-04-25 RX ADMIN — METOPROLOL SUCCINATE 25 MG: 25 TABLET, EXTENDED RELEASE ORAL at 09:04

## 2023-04-25 RX ADMIN — ENOXAPARIN SODIUM 40 MG: 40 INJECTION SUBCUTANEOUS at 04:04

## 2023-04-25 RX ADMIN — PANTOPRAZOLE SODIUM 40 MG: 40 TABLET, DELAYED RELEASE ORAL at 06:04

## 2023-04-25 RX ADMIN — Medication 9 MG: at 08:04

## 2023-04-25 RX ADMIN — ASPIRIN 81 MG CHEWABLE TABLET 81 MG: 81 TABLET CHEWABLE at 09:04

## 2023-04-25 NOTE — PROGRESS NOTES
[facility]  Cardiology  Progress Note    Patient Name: Brandon Rivas  MRN: [id number]  Admission Date: 4/22/2023  Hospital Length of Stay: 3 days  Code Status: Full Code   Attending Physician: Steven Mckeon MD   Primary Care Physician: James Garcia MD  Expected Discharge Date:   Principal Problem:Ventricular tachycardia    Subjective:     Hospital Course:   Admitted to CCU for monomorphic VT.  Patient initially in VT with rates 120s, converted to sinus michelle (HR 50s) on admission.  Reloaded amio IV at 1mg/h and started on a lidocaine infusion.  Went back into VT to 120s, paced out of VT at bedside.  EP consulted, patient underwent ablation on 4/24 for VT arising from apical/apical anterior LV. After the procedure patients HR remained in the 40-50's. Lidocaine was stopped and he was switched to PO amio.       Interval History: HR overnight dropping to the 30's, IV amio switched to PO. HR stable in the 40-50's. Will step down from ICU today and have PT/OT work with him tomorrow and likely discharge tomorrow afternoon.     Review of Systems   Constitutional: Positive for malaise/fatigue. Negative for decreased appetite, weight gain and weight loss.   Cardiovascular:  Negative for irregular heartbeat, orthopnea, palpitations and paroxysmal nocturnal dyspnea.   Respiratory:  Positive for shortness of breath. Negative for cough.    Gastrointestinal:  Negative for bloating, diarrhea, nausea and vomiting.   Genitourinary:  Negative for dysuria and hematuria.   Neurological:  Positive for weakness. Negative for focal weakness.   Objective:     Vital Signs (Most Recent):  Temp: 97.6 °F (36.4 °C) (04/25/23 0700)  Pulse: (!) 51 (04/25/23 1000)  Resp: (!) 26 (04/25/23 1000)  BP: 106/64 (04/25/23 0800)  SpO2: 97 % (04/25/23 1000)   Vital Signs (24h Range):  Temp:  [97.6 °F (36.4 °C)-97.9 °F (36.6 °C)] 97.6 °F (36.4 °C)  Pulse:  [44-51] 51  Resp:  [11-26] 26  SpO2:  [91 %-100 %] 97  %  BP: ()/(55-70) 106/64     Weight: 92.1 kg (203 lb)  Body mass index is 26.78 kg/m².     SpO2: 97 %         Intake/Output Summary (Last 24 hours) at 4/25/2023 1055  Last data filed at 4/25/2023 1000  Gross per 24 hour   Intake 2166.1 ml   Output 1975 ml   Net 191.1 ml       Lines/Drains/Airways       Drain  Duration             Male External Urinary Catheter 04/24/23 2338 Small <1 day              Arterial Line  Duration             Arterial Line 04/24/23 1348 Left Radial <1 day              Peripheral Intravenous Line  Duration                  Peripheral IV - Single Lumen 04/22/23 1245 20 G Left Forearm 2 days         Peripheral IV - Single Lumen 04/24/23 1154 16 G Left Forearm <1 day                    Physical Exam  Constitutional:       General: He is not in acute distress.     Appearance: Normal appearance.   HENT:      Mouth/Throat:      Comments: Poor dentition  Eyes:      Extraocular Movements: Extraocular movements intact.      Pupils: Pupils are equal, round, and reactive to light.   Cardiovascular:      Rate and Rhythm: Normal rate and regular rhythm.      Pulses: Normal pulses.      Heart sounds: Normal heart sounds.   Pulmonary:      Effort: Pulmonary effort is normal.      Breath sounds: Normal breath sounds.   Abdominal:      General: Abdomen is flat. Bowel sounds are normal. There is no distension.      Palpations: Abdomen is soft.      Tenderness: There is no abdominal tenderness. There is no guarding.      Hernia: No hernia is present.   Musculoskeletal:      Right lower leg: No edema.      Left lower leg: No edema.   Skin:     Capillary Refill: Capillary refill takes less than 2 seconds.      Findings: No lesion.   Neurological:      General: No focal deficit present.      Mental Status: He is alert and oriented to person, place, and time.   Psychiatric:         Mood and Affect: Mood is anxious.       Significant Labs: All pertinent lab results from the last 24 hours have been  reviewed.    Significant Imaging: All pertinent imaging results from the last 24 hours have been reviewed.      Assessment and Plan:     * Ventricular tachycardia  67 y.o. male with CAD s/p stent (though patient does not recall), HFrEF (35%) s/p AICD, hyperlipidemia, hypertension, tobacco use who presents for nonsustained VT.  Stress test with  large fixed hypokinetic defect in apex and anterior wall.  Started on amio drip, converted to amio 200 mg TID, however patient continued to have intermittent VT so started on lidocaine drip.  On arrival, patient in VT with HR 130s, spontaneously converted to NSR.  HDS with BP 100s systolic throughout.     - S/P ablation on 4/24  - Lidocaine infusion stopped post-ablation   - IV amio switched to 400mg PO daily   - continue ASA 81 q daily for at least 30 days   - TTE on 4/24 with no LV thrombus or aneurysm, EF 35%  - FU with Dr. Grace in 3-months     Chronic combined systolic and diastolic heart failure    Lab Results   Component Value Date    TROPONINI 0.033 (H) 12/19/2016     (H) 04/21/2023     Results for orders placed during the hospital encounter of 02/21/23  Echo  Interpretation Summary  · The left ventricle is mildly enlarged with moderate concentric hypertrophy evidence of old apical scar seen on apical four-chamber view  · The estimated ejection fraction is 34%.  · Grade III left ventricular diastolic dysfunction. Mean left atrial pressure 11  · There are segmental left ventricular wall motion abnormalities.  · Atrial fibrillation not observed.  · Normal right ventricular size with mildly reduced right ventricular systolic function.  · Mild-to-moderate aortic regurgitation.  · Severe mitral regurgitation.  · Intermediate central venous pressure (8 mmHg).  · The estimated PA systolic pressure is 30 mmHg. Mild pulmonary hypertension is present    - compensated on exam  - continue lisinopril, metoprolol, spironolactone  - continue asa, statin  - continue lasix 40  mg IV qd  - Cardiac diet, fluid restriction at 1500 cc with strict I/Os and daily standing weights  - Maintain Mg >2, K >4    Tobacco dependence  - patient deferring nicotine patch    Hyperlipidemia  - continue home statin    Hypertension  - continue home antihypertensives        VTE Risk Mitigation (From admission, onward)         Ordered     heparin infusion 1,000 units/500 ml in 0.9% NaCl (on sterile field)  As needed (PRN)         04/24/23 1212     enoxaparin injection 40 mg  Daily         04/22/23 1410     IP VTE HIGH RISK PATIENT  Once         04/22/23 1410     Place sequential compression device  Until discontinued         04/22/23 1410                Ramesh Rossi MD  Cardiology  [facility]

## 2023-04-25 NOTE — ASSESSMENT & PLAN NOTE
Patient transferred for MMVT. Interrogation with episode of VT approx 1 week ago which was successfully terminated with ICD discharge. Patient appeared to have been in slow MMVT earlier tonight, found when doing bedside rounds. He was successful paced to NSR multiple times. Reprogrammed to VT therapy zone of 115 with ATP followed by shocks. Treated with lido and amiodarone gtt. S/p Radiofrequency ablation for treatment of VT on 4/24 with Dr. Grace.     Recs  - can transition to PO amiodarone 400mg qdaily.   - continue ASA 81 q daily for at least 30 days.

## 2023-04-25 NOTE — SUBJECTIVE & OBJECTIVE
Interval History: s/p ablation on 4/24, tolerated procedure well. Admits he was not able to sleep last night because of tele. Amio was held overnight due to bradycardia. No VT events overnight.       Objective:     Vital Signs (Most Recent):  Temp: 97.6 °F (36.4 °C) (04/25/23 0352)  Pulse: (!) 46 (04/25/23 0714)  Resp: 12 (04/25/23 0714)  BP: (!) 114/55 (04/25/23 0352)  SpO2: 99 % (04/25/23 0714)   Vital Signs (24h Range):  Temp:  [97.6 °F (36.4 °C)-97.9 °F (36.6 °C)] 97.6 °F (36.4 °C)  Pulse:  [44-52] 46  Resp:  [11-22] 12  SpO2:  [91 %-100 %] 99 %  BP: ()/(55-72) 114/55     Weight: 92.1 kg (203 lb)  Body mass index is 26.78 kg/m².     SpO2: 99 %       Physical Exam  Constitutional:       General: He is not in acute distress.     Appearance: Normal appearance. He is not ill-appearing.   HENT:      Head: Normocephalic and atraumatic.      Nose: No congestion.      Mouth/Throat:      Mouth: Mucous membranes are moist.   Eyes:      Conjunctiva/sclera: Conjunctivae normal.   Cardiovascular:      Rate and Rhythm: Normal rate and regular rhythm.      Pulses: Normal pulses.   Pulmonary:      Effort: Pulmonary effort is normal. No respiratory distress.   Abdominal:      General: Abdomen is flat. There is no distension.      Palpations: Abdomen is soft.   Musculoskeletal:      Cervical back: Normal range of motion.      Right lower leg: No edema.      Left lower leg: No edema.   Skin:     Capillary Refill: Capillary refill takes less than 2 seconds.      Findings: No rash.   Neurological:      Mental Status: He is alert and oriented to person, place, and time.   Psychiatric:         Mood and Affect: Mood normal.     Significant Labs:   Recent Lab Results  (Last 5 results in the past 24 hours)        04/25/23  0309   04/24/23  2244   04/24/23  1713   04/24/23  1429   04/24/23  1404        Albumin 3.3               Alkaline Phosphatase 89               ALT 16               Anion Gap 7               AST 42                Baso # 0.05               Basophil % 0.7               BILIRUBIN TOTAL 0.4  Comment: For infants and newborns, interpretation of results should be based  on gestational age, weight and in agreement with clinical  observations.    Premature Infant recommended reference ranges:  Up to 24 hours.............<8.0 mg/dL  Up to 48 hours............<12.0 mg/dL  3-5 days..................<15.0 mg/dL  6-29 days.................<15.0 mg/dL                 BUN 15               Calcium 8.9               Chloride 102               CO2 25               Creatinine 0.7               Differential Method Automated               eGFR >60.0               Eos # 0.1               Eosinophil % 1.0               Glucose 93               Gran # (ANC) 4.5               Gran % 67.2               Hematocrit 37.5               Hemoglobin 12.0               Immature Grans (Abs) 0.02  Comment: Mild elevation in immature granulocytes is non specific and   can be seen in a variety of conditions including stress response,   acute inflammation, trauma and pregnancy. Correlation with other   laboratory and clinical findings is essential.                 Immature Granulocytes 0.3               Lymph # 1.4               Lymph % 20.9               Magnesium 2.0               MCH 30.1               MCHC 32.0               MCV 94               Mono # 0.7               Mono % 9.9               MPV 11.9               nRBC 0               Platelets 177               POC ACTIVATED CLOTTING TIME K       407   450       POCT Glucose   97   87           Potassium 3.9               PROTEIN TOTAL 6.7               RBC 3.99               RDW 14.6               Sample       ARTERIAL   ARTERIAL       Sodium 134               WBC 6.70

## 2023-04-25 NOTE — PROGRESS NOTES
[facility] - Card[facility]ensive [facility]  Cardiac Electrophysiology  Progress Note    Admission Date: 4/22/2023  Code Status: Full Code   Attending Physician: Steven Mckeon MD   Expected Discharge Date:   Principal Problem:Ventricular tachycardia    Subjective:     Interval History: s/p ablation on 4/24, tolerated procedure well. Admits he was not able to sleep last night because of tele. Amio was held overnight due to bradycardia. No VT events overnight.       Objective:     Vital Signs (Most Recent):  Temp: 97.6 °F (36.4 °C) (04/25/23 0352)  Pulse: (!) 46 (04/25/23 0714)  Resp: 12 (04/25/23 0714)  BP: (!) 114/55 (04/25/23 0352)  SpO2: 99 % (04/25/23 0714)   Vital Signs (24h Range):  Temp:  [97.6 °F (36.4 °C)-97.9 °F (36.6 °C)] 97.6 °F (36.4 °C)  Pulse:  [44-52] 46  Resp:  [11-22] 12  SpO2:  [91 %-100 %] 99 %  BP: ()/(55-72) 114/55     Weight: 92.1 kg (203 lb)  Body mass index is 26.78 kg/m².     SpO2: 99 %       Physical Exam  Constitutional:       General: He is not in acute distress.     Appearance: Normal appearance. He is not ill-appearing.   HENT:      Head: Normocephalic and atraumatic.      Nose: No congestion.      Mouth/Throat:      Mouth: Mucous membranes are moist.   Eyes:      Conjunctiva/sclera: Conjunctivae normal.   Cardiovascular:      Rate and Rhythm: Normal rate and regular rhythm.      Pulses: Normal pulses.   Pulmonary:      Effort: Pulmonary effort is normal. No respiratory distress.   Abdominal:      General: Abdomen is flat. There is no distension.      Palpations: Abdomen is soft.   Musculoskeletal:      Cervical back: Normal range of motion.      Right lower leg: No edema.      Left lower leg: No edema.   Skin:     Capillary Refill: Capillary refill takes less than 2 seconds.      Findings: No rash.   Neurological:      Mental Status: He is alert and oriented to person, place, and time.   Psychiatric:         Mood and Affect: Mood normal.     Significant Labs:   Recent Lab Results   (Last 5 results in the past 24 hours)        04/25/23  0309   04/24/23  2244   04/24/23  1713   04/24/23  1429   04/24/23  1404        Albumin 3.3               Alkaline Phosphatase 89               ALT 16               Anion Gap 7               AST 42               Baso # 0.05               Basophil % 0.7               BILIRUBIN TOTAL 0.4  Comment: For infants and newborns, interpretation of results should be based  on gestational age, weight and in agreement with clinical  observations.    Premature Infant recommended reference ranges:  Up to 24 hours.............<8.0 mg/dL  Up to 48 hours............<12.0 mg/dL  3-5 days..................<15.0 mg/dL  6-29 days.................<15.0 mg/dL                 BUN 15               Calcium 8.9               Chloride 102               CO2 25               Creatinine 0.7               Differential Method Automated               eGFR >60.0               Eos # 0.1               Eosinophil % 1.0               Glucose 93               Gran # (ANC) 4.5               Gran % 67.2               Hematocrit 37.5               Hemoglobin 12.0               Immature Grans (Abs) 0.02  Comment: Mild elevation in immature granulocytes is non specific and   can be seen in a variety of conditions including stress response,   acute inflammation, trauma and pregnancy. Correlation with other   laboratory and clinical findings is essential.                 Immature Granulocytes 0.3               Lymph # 1.4               Lymph % 20.9               Magnesium 2.0               MCH 30.1               MCHC 32.0               MCV 94               Mono # 0.7               Mono % 9.9               MPV 11.9               nRBC 0               Platelets 177               POC ACTIVATED CLOTTING TIME K       407   450       POCT Glucose   97   87           Potassium 3.9               PROTEIN TOTAL 6.7               RBC 3.99               RDW 14.6               Sample       ARTERIAL   ARTERIAL       Sodium  134               WBC 6.70                                        Assessment and Plan:     * Ventricular tachycardia  Patient transferred for MMVT. Interrogation with episode of VT approx 1 week ago which was successfully terminated with ICD discharge. Patient appeared to have been in slow MMVT earlier tonight, found when doing bedside rounds. He was successful paced to NSR multiple times. Reprogrammed to VT therapy zone of 115 with ATP followed by shocks. Treated with lido and amiodarone gtt. S/p Radiofrequency ablation for treatment of VT on 4/24 with Dr. Grace.     Recs  - can transition to PO amiodarone 400mg qdaily.   - continue ASA 81 q daily for at least 30 days.   - Patient can follow up as outpatient with Dr. Grace in 3 months.     Discussed with Dr. Grace and CCU team. We will sign off. Please let us know if any questions/concerns arise.         Cynthia Chapman MD  Cardiac Electrophysiology  [facility]

## 2023-04-25 NOTE — ANESTHESIA POSTPROCEDURE EVALUATION
Anesthesia Post Evaluation    Patient: Brandon Rivas    Procedure(s) Performed: Procedure(s) (LRB):  Ablation, Scar VT (N/A)    Final Anesthesia Type: general      Level of consciousness: awake and alert  Post-procedure vital signs: reviewed and stable  Pain control: Pain has been treated.  Airway patency: patent    PONV status: Absent or treated.  Anesthetic complications: no      Cardiovascular status: hemodynamically stable  Respiratory status: unassisted  Hydration status: euvolemic            Vitals Value Taken Time   /64 04/25/23 0802   Temp 36.4 °C (97.6 °F) 04/25/23 0352   Pulse 49 04/25/23 0908   Resp 26 04/25/23 0908   SpO2 98 % 04/25/23 0908   Vitals shown include unvalidated device data.      No case tracking events are documented in the log.      Pain/Kalyan Score: Pain Rating Prior to Med Admin: 3 (4/24/2023  8:23 PM)  Pain Rating Post Med Admin: 1 (4/24/2023  9:23 PM)

## 2023-04-25 NOTE — NURSING
"Informed Dr. Freed of patient heart rate consistently dropping to low 40's sometimes 38- 39.  MD states, "decrease amio gtt to 0.5 and turn off if heart rate continues to drop."  "

## 2023-04-25 NOTE — ASSESSMENT & PLAN NOTE
67 y.o. male with CAD s/p stent (though patient does not recall), HFrEF (35%) s/p AICD, hyperlipidemia, hypertension, tobacco use who presents for nonsustained VT.  Stress test with  large fixed hypokinetic defect in apex and anterior wall.  Started on amio drip, converted to amio 200 mg TID, however patient continued to have intermittent VT so started on lidocaine drip.  On arrival, patient in VT with HR 130s, spontaneously converted to NSR.  HDS with BP 100s systolic throughout.     - S/P ablation on 4/24  - Lidocaine infusion stopped post-ablation   - IV amio switched to 400mg PO daily   - continue ASA 81 q daily for at least 30 days   - TTE on 4/24 with no LV thrombus or aneurysm, EF 35%  - FU with Dr. Grace in 3-months

## 2023-04-25 NOTE — PLAN OF CARE
Patient stepped down this shift. Plan of care discussed with patient. Verbalized understanding. Patient is free of fall/trauma/injury, fall precautions in place. PRN Tylenol administered for complains of headache. Sinus Perez on telemonitor with HR in high 40's. Patient asymptomatic. All questions addressed. Will continue to monitor.

## 2023-04-25 NOTE — SUBJECTIVE & OBJECTIVE
Interval History: HR overnight dropping to the 30's, IV amio switched to PO. HR stable in the 40-50's. Will step down from ICU today and have PT/OT work with him tomorrow and likely discharge tomorrow afternoon.     Review of Systems   Constitutional: Positive for malaise/fatigue. Negative for decreased appetite, weight gain and weight loss.   Cardiovascular:  Negative for irregular heartbeat, orthopnea, palpitations and paroxysmal nocturnal dyspnea.   Respiratory:  Positive for shortness of breath. Negative for cough.    Gastrointestinal:  Negative for bloating, diarrhea, nausea and vomiting.   Genitourinary:  Negative for dysuria and hematuria.   Neurological:  Positive for weakness. Negative for focal weakness.   Objective:     Vital Signs (Most Recent):  Temp: 97.6 °F (36.4 °C) (04/25/23 0700)  Pulse: (!) 51 (04/25/23 1000)  Resp: (!) 26 (04/25/23 1000)  BP: 106/64 (04/25/23 0800)  SpO2: 97 % (04/25/23 1000)   Vital Signs (24h Range):  Temp:  [97.6 °F (36.4 °C)-97.9 °F (36.6 °C)] 97.6 °F (36.4 °C)  Pulse:  [44-51] 51  Resp:  [11-26] 26  SpO2:  [91 %-100 %] 97 %  BP: ()/(55-70) 106/64     Weight: 92.1 kg (203 lb)  Body mass index is 26.78 kg/m².     SpO2: 97 %         Intake/Output Summary (Last 24 hours) at 4/25/2023 1055  Last data filed at 4/25/2023 1000  Gross per 24 hour   Intake 2166.1 ml   Output 1975 ml   Net 191.1 ml       Lines/Drains/Airways       Drain  Duration             Male External Urinary Catheter 04/24/23 2338 Small <1 day              Arterial Line  Duration             Arterial Line 04/24/23 1348 Left Radial <1 day              Peripheral Intravenous Line  Duration                  Peripheral IV - Single Lumen 04/22/23 1245 20 G Left Forearm 2 days         Peripheral IV - Single Lumen 04/24/23 1154 16 G Left Forearm <1 day                    Physical Exam  Constitutional:       General: He is not in acute distress.     Appearance: Normal appearance.   HENT:      Mouth/Throat:       Comments: Poor dentition  Eyes:      Extraocular Movements: Extraocular movements intact.      Pupils: Pupils are equal, round, and reactive to light.   Cardiovascular:      Rate and Rhythm: Normal rate and regular rhythm.      Pulses: Normal pulses.      Heart sounds: Normal heart sounds.   Pulmonary:      Effort: Pulmonary effort is normal.      Breath sounds: Normal breath sounds.   Abdominal:      General: Abdomen is flat. Bowel sounds are normal. There is no distension.      Palpations: Abdomen is soft.      Tenderness: There is no abdominal tenderness. There is no guarding.      Hernia: No hernia is present.   Musculoskeletal:      Right lower leg: No edema.      Left lower leg: No edema.   Skin:     Capillary Refill: Capillary refill takes less than 2 seconds.      Findings: No lesion.   Neurological:      General: No focal deficit present.      Mental Status: He is alert and oriented to person, place, and time.   Psychiatric:         Mood and Affect: Mood is anxious.       Significant Labs: All pertinent lab results from the last 24 hours have been reviewed.    Significant Imaging: All pertinent imaging results from the last 24 hours have been reviewed.

## 2023-04-25 NOTE — NURSING TRANSFER
Nursing Transfer Note      4/25/2023     Reason patient is being transferred: SD    Transfer To: 315 from 3080    Transfer via wheelchair    Transfer with cardiac monitoring    Transported by RN     Medicines sent: yes    Any special needs or follow-up needed: n/a    Chart send with patient: Yes    Notified: Brother, informed him he is in 315 and will be monitored over night and will have a plan in the morning    Patient reassessed at: 1300 4/25     Upon arrival to floor: cardiac monitor applied, patient oriented to room, call bell in reach, and bed in lowest position

## 2023-04-26 VITALS
HEART RATE: 54 BPM | BODY MASS INDEX: 26.9 KG/M2 | DIASTOLIC BLOOD PRESSURE: 72 MMHG | OXYGEN SATURATION: 97 % | TEMPERATURE: 98 F | SYSTOLIC BLOOD PRESSURE: 119 MMHG | RESPIRATION RATE: 16 BRPM | HEIGHT: 73 IN | WEIGHT: 203 LBS

## 2023-04-26 LAB
ALBUMIN SERPL BCP-MCNC: 3.3 G/DL (ref 3.5–5.2)
ALP SERPL-CCNC: 92 U/L (ref 55–135)
ALT SERPL W/O P-5'-P-CCNC: 12 U/L (ref 10–44)
ANION GAP SERPL CALC-SCNC: 8 MMOL/L (ref 8–16)
AST SERPL-CCNC: 23 U/L (ref 10–40)
BASOPHILS # BLD AUTO: 0.07 K/UL (ref 0–0.2)
BASOPHILS NFR BLD: 1.1 % (ref 0–1.9)
BILIRUB SERPL-MCNC: 0.5 MG/DL (ref 0.1–1)
BUN SERPL-MCNC: 16 MG/DL (ref 8–23)
CALCIUM SERPL-MCNC: 8.8 MG/DL (ref 8.7–10.5)
CHLORIDE SERPL-SCNC: 104 MMOL/L (ref 95–110)
CO2 SERPL-SCNC: 25 MMOL/L (ref 23–29)
CREAT SERPL-MCNC: 0.8 MG/DL (ref 0.5–1.4)
DIFFERENTIAL METHOD: ABNORMAL
EOSINOPHIL # BLD AUTO: 0.1 K/UL (ref 0–0.5)
EOSINOPHIL NFR BLD: 2.2 % (ref 0–8)
ERYTHROCYTE [DISTWIDTH] IN BLOOD BY AUTOMATED COUNT: 14.4 % (ref 11.5–14.5)
EST. GFR  (NO RACE VARIABLE): >60 ML/MIN/1.73 M^2
GLUCOSE SERPL-MCNC: 86 MG/DL (ref 70–110)
HCT VFR BLD AUTO: 38.9 % (ref 40–54)
HGB BLD-MCNC: 12.2 G/DL (ref 14–18)
IMM GRANULOCYTES # BLD AUTO: 0.02 K/UL (ref 0–0.04)
IMM GRANULOCYTES NFR BLD AUTO: 0.3 % (ref 0–0.5)
LYMPHOCYTES # BLD AUTO: 1.2 K/UL (ref 1–4.8)
LYMPHOCYTES NFR BLD: 18.3 % (ref 18–48)
MAGNESIUM SERPL-MCNC: 2 MG/DL (ref 1.6–2.6)
MCH RBC QN AUTO: 30 PG (ref 27–31)
MCHC RBC AUTO-ENTMCNC: 31.4 G/DL (ref 32–36)
MCV RBC AUTO: 96 FL (ref 82–98)
MONOCYTES # BLD AUTO: 0.8 K/UL (ref 0.3–1)
MONOCYTES NFR BLD: 12 % (ref 4–15)
NEUTROPHILS # BLD AUTO: 4.2 K/UL (ref 1.8–7.7)
NEUTROPHILS NFR BLD: 66.1 % (ref 38–73)
NRBC BLD-RTO: 0 /100 WBC
PLATELET # BLD AUTO: 166 K/UL (ref 150–450)
PMV BLD AUTO: 11.4 FL (ref 9.2–12.9)
POCT GLUCOSE: 89 MG/DL (ref 70–110)
POCT GLUCOSE: 99 MG/DL (ref 70–110)
POTASSIUM SERPL-SCNC: 3.8 MMOL/L (ref 3.5–5.1)
PROT SERPL-MCNC: 6.9 G/DL (ref 6–8.4)
RBC # BLD AUTO: 4.06 M/UL (ref 4.6–6.2)
SODIUM SERPL-SCNC: 137 MMOL/L (ref 136–145)
WBC # BLD AUTO: 6.35 K/UL (ref 3.9–12.7)

## 2023-04-26 PROCEDURE — 99238 HOSP IP/OBS DSCHRG MGMT 30/<: CPT | Mod: GC,,, | Performed by: INTERNAL MEDICINE

## 2023-04-26 PROCEDURE — 63600175 PHARM REV CODE 636 W HCPCS: Performed by: INTERNAL MEDICINE

## 2023-04-26 PROCEDURE — 83735 ASSAY OF MAGNESIUM: CPT | Performed by: INTERNAL MEDICINE

## 2023-04-26 PROCEDURE — 25000003 PHARM REV CODE 250: Performed by: INTERNAL MEDICINE

## 2023-04-26 PROCEDURE — 99238 PR HOSPITAL DISCHARGE DAY,<30 MIN: ICD-10-PCS | Mod: GC,,, | Performed by: INTERNAL MEDICINE

## 2023-04-26 PROCEDURE — 25000003 PHARM REV CODE 250: Performed by: STUDENT IN AN ORGANIZED HEALTH CARE EDUCATION/TRAINING PROGRAM

## 2023-04-26 PROCEDURE — 36415 COLL VENOUS BLD VENIPUNCTURE: CPT | Performed by: INTERNAL MEDICINE

## 2023-04-26 PROCEDURE — 97161 PT EVAL LOW COMPLEX 20 MIN: CPT

## 2023-04-26 PROCEDURE — 80053 COMPREHEN METABOLIC PANEL: CPT | Performed by: INTERNAL MEDICINE

## 2023-04-26 PROCEDURE — 85025 COMPLETE CBC W/AUTO DIFF WBC: CPT | Performed by: INTERNAL MEDICINE

## 2023-04-26 PROCEDURE — 97166 OT EVAL MOD COMPLEX 45 MIN: CPT

## 2023-04-26 RX ORDER — SERTRALINE HYDROCHLORIDE 50 MG/1
50 TABLET, FILM COATED ORAL NIGHTLY
Qty: 30 TABLET | Refills: 11 | Status: SHIPPED | OUTPATIENT
Start: 2023-04-26 | End: 2023-05-09 | Stop reason: SDUPTHER

## 2023-04-26 RX ORDER — POTASSIUM CHLORIDE 750 MG/1
10 TABLET, EXTENDED RELEASE ORAL DAILY
Qty: 90 TABLET | Refills: 3 | Status: SHIPPED | OUTPATIENT
Start: 2023-04-26 | End: 2024-04-25

## 2023-04-26 RX ORDER — VITS A,C,E/LUTEIN/MINERALS 300MCG-200
200 TABLET ORAL DAILY
Qty: 30 TABLET | Refills: 11 | Status: SHIPPED | OUTPATIENT
Start: 2023-04-26

## 2023-04-26 RX ORDER — FUROSEMIDE 40 MG/1
40 TABLET ORAL DAILY
Status: DISCONTINUED | OUTPATIENT
Start: 2023-04-26 | End: 2023-04-26 | Stop reason: HOSPADM

## 2023-04-26 RX ORDER — METOPROLOL SUCCINATE 25 MG/1
12.5 TABLET, EXTENDED RELEASE ORAL DAILY
Qty: 15 TABLET | Refills: 11 | Status: SHIPPED | OUTPATIENT
Start: 2023-04-26 | End: 2024-04-25

## 2023-04-26 RX ADMIN — PANTOPRAZOLE SODIUM 40 MG: 40 TABLET, DELAYED RELEASE ORAL at 05:04

## 2023-04-26 RX ADMIN — ACETAMINOPHEN 650 MG: 325 TABLET ORAL at 01:04

## 2023-04-26 RX ADMIN — SPIRONOLACTONE 25 MG: 25 TABLET, FILM COATED ORAL at 08:04

## 2023-04-26 RX ADMIN — ONDANSETRON 4 MG: 2 INJECTION INTRAMUSCULAR; INTRAVENOUS at 10:04

## 2023-04-26 RX ADMIN — FUROSEMIDE 40 MG: 40 TABLET ORAL at 08:04

## 2023-04-26 RX ADMIN — LORAZEPAM 0.5 MG: 0.5 TABLET ORAL at 03:04

## 2023-04-26 RX ADMIN — POTASSIUM CHLORIDE 10 MEQ: 10 CAPSULE, COATED, EXTENDED RELEASE ORAL at 08:04

## 2023-04-26 RX ADMIN — AMIODARONE HYDROCHLORIDE 400 MG: 200 TABLET ORAL at 08:04

## 2023-04-26 RX ADMIN — Medication 400 MG: at 08:04

## 2023-04-26 RX ADMIN — ACETAMINOPHEN 650 MG: 325 TABLET ORAL at 08:04

## 2023-04-26 RX ADMIN — LISINOPRIL 5 MG: 5 TABLET ORAL at 08:04

## 2023-04-26 RX ADMIN — ASPIRIN 81 MG CHEWABLE TABLET 81 MG: 81 TABLET CHEWABLE at 08:04

## 2023-04-26 RX ADMIN — LORAZEPAM 0.5 MG: 0.5 TABLET ORAL at 08:04

## 2023-04-26 NOTE — PLAN OF CARE
[facility] - Cardiology Stepdown      HOME HEALTH ORDERS  FACE TO FACE ENCOUNTER    Patient Name: Brandon Rivas  YOB: 1955    PCP: James Garcia MD   PCP Address: [address] / [place] [address]  PCP Phone Number: [phone]  PCP Fax: [phone]    Encounter Date: 4/22/23    Admit to Home Health    Diagnoses:  Active Hospital Problems    Diagnosis  POA    *Ventricular tachycardia [I47.20]  Yes    Hypertension [I10]  Yes    Hyperlipidemia [E78.5]  Yes    Tobacco dependence [F17.200]  Yes    Chronic combined systolic and diastolic heart failure [I50.42]  Yes      Resolved Hospital Problems   No resolved problems to display.       Follow Up Appointments:  Future Appointments   Date Time Provider Department Center   5/8/2023 10:00 AM James Garcia MD [facility]       Allergies:  Review of patient's allergies indicates:   Allergen Reactions    Bactrim [sulfamethoxazole-trimethoprim] Shortness Of Breath    Statins-hmg-coa reductase inhibitors Other (See Comments)     Statin intolerance. Muscle weakness    Wellbutrin [bupropion hcl] Other (See Comments)     Abdominal pain        Medications: Review discharge medications with patient and family and provide education.    Current Facility-Administered Medications   Medication Dose Route Frequency Provider Last Rate Last Admin    acetaminophen tablet 650 mg  650 mg Oral Q4H PRN Jesús Heller MD   650 mg at 04/26/23 0838    amiodarone tablet 400 mg  400 mg Oral Daily Gareth Lucas MD   400 mg at 04/26/23 0838    aspirin chewable tablet 81 mg  81 mg Oral Daily Jesús Heller MD   81 mg at 04/26/23 0838    butalbital-acetaminophen-caffeine -40 mg per tablet 1 tablet  1 tablet Oral Q4H PRN Jesús Heller MD        dextrose 10% bolus 125 mL 125 mL  12.5 g Intravenous PRN Jefferson Grayson MD        dextrose 10% bolus 250 mL 250 mL  25 g Intravenous PRN Jefferson Grayson MD        dextrose 40 % gel 15,000 mg  15 g  Oral PRN Jefferson Grayson MD        dextrose 40 % gel 30,000 mg  30 g Oral PRN Jefferson Grayson MD        enoxaparin injection 40 mg  40 mg Subcutaneous Daily Jesús Heller MD   40 mg at 04/25/23 1625    furosemide tablet 40 mg  40 mg Oral Daily Ramesh Rossi MD   40 mg at 04/26/23 0839    glucagon (human recombinant) injection 1 mg  1 mg Intramuscular PRN Jefferson Grayson MD        heparin infusion 1,000 units/500 ml in 0.9% NaCl (on sterile field)    PRN Helder Grace MD   1,000 mL at 04/24/23 1211    insulin aspart U-100 pen 0-5 Units  0-5 Units Subcutaneous QID (AC + HS) PRN Jefferson Grayson MD        LIDOcaine HCL 20 mg/ml (2%) injection    PRN Helder Grace MD   15 mL at 04/24/23 1211    lisinopriL tablet 5 mg  5 mg Oral Daily Jesús Heller MD   5 mg at 04/26/23 0839    LORazepam tablet 0.5 mg  0.5 mg Oral Q6H PRN Jesús Heller MD   0.5 mg at 04/26/23 0839    magnesium oxide tablet 400 mg  400 mg Oral Daily Jesús Heller MD   400 mg at 04/26/23 0840    magnesium oxide tablet 800 mg  800 mg Oral PRN Jesús Heller MD        magnesium oxide tablet 800 mg  800 mg Oral PRN Jesús Helelr MD        melatonin tablet 9 mg  9 mg Oral Nightly PRN Jesús Heller MD   9 mg at 04/25/23 2027    metoprolol succinate (TOPROL-XL) 24 hr tablet 25 mg  25 mg Oral Daily Jesús Heller MD   25 mg at 04/25/23 0910    mupirocin 2 % ointment   Nasal BID Steven Mckeon MD   Given at 04/25/23 0910    nitroGLYCERIN SL tablet 0.4 mg  0.4 mg Sublingual Q5 Min PRN Jesús Heller MD        ondansetron injection 4 mg  4 mg Intravenous Q8H PRN Jesús Heller MD   4 mg at 04/26/23 1006    pantoprazole EC tablet 40 mg  40 mg Oral Before breakfast Jesús Heller MD   40 mg at 04/26/23 0537    polyethylene glycol packet 17 g  17 g Oral BID PRN Jesús Heller MD        potassium bicarbonate disintegrating tablet 35 mEq  35 mEq Oral PRN Jesús Heller MD        potassium bicarbonate disintegrating tablet 50 mEq  50 mEq Oral  PRN Jesús Heller MD   50 mEq at 04/23/23 0805    potassium bicarbonate disintegrating tablet 60 mEq  60 mEq Oral PRN Jesús Heller MD        potassium chloride CR capsule 10 mEq  10 mEq Oral Every other day Jesús Heller MD   10 mEq at 04/26/23 0839    potassium chloride SA CR tablet 40 mEq  40 mEq Oral Once Cynthia Chapman MD        promethazine tablet 12.5 mg  12.5 mg Oral Q6H PRN Gareth Lucas MD   12.5 mg at 04/24/23 0844    sertraline tablet 50 mg  50 mg Oral QHS Jesús Heller MD   50 mg at 04/25/23 2026    sodium chloride 0.9% flush 2 mL  2 mL Intravenous PRN Jesús Heller MD        spironolactone tablet 25 mg  25 mg Oral Daily Jesús Heller MD   25 mg at 04/26/23 0840     Current Discharge Medication List        START taking these medications    Details   magnesium oxide 200 mg magnesium Tab Take 200 mg by mouth once daily.  Qty: 30 tablet, Refills: 11    Associated Diagnoses: VT (ventricular tachycardia)      sertraline (ZOLOFT) 50 MG tablet Take 1 tablet (50 mg total) by mouth every evening.  Qty: 30 tablet, Refills: 11    Associated Diagnoses: Status post ablation operation for arrhythmia           CONTINUE these medications which have CHANGED    Details   amiodarone (PACERONE) 200 MG Tab Take 2 tablets (400 mg total) by mouth once daily.  Qty: 60 tablet, Refills: 11    Associated Diagnoses: NSVT (nonsustained ventricular tachycardia)      lisinopriL (PRINIVIL,ZESTRIL) 5 MG tablet Take 1 tablet (5 mg total) by mouth once daily.  Qty: 90 tablet, Refills: 3    Comments: .  Associated Diagnoses: Chronic combined systolic and diastolic heart failure      metoprolol succinate (TOPROL-XL) 25 MG 24 hr tablet Take 0.5 tablets (12.5 mg total) by mouth once daily.  Qty: 15 tablet, Refills: 11    Comments: .  Associated Diagnoses: Chronic combined systolic and diastolic heart failure      potassium chloride (KLOR-CON) 10 MEQ TbSR Take 1 tablet (10 mEq total) by mouth once daily.  Qty: 90  tablet, Refills: 3    Associated Diagnoses: VT (ventricular tachycardia)           CONTINUE these medications which have NOT CHANGED    Details   butalbital-acetaminophen-caffeine -40 mg (FIORICET, ESGIC) -40 mg per tablet Take 1 tablet by mouth every 4 (four) hours as needed for Headaches.  Qty: 20 tablet, Refills: 0    Associated Diagnoses: Stress headache      furosemide (LASIX) 40 MG tablet Take 1 tablet (40 mg total) by mouth once daily.  Qty: 90 tablet, Refills: 3      nitroGLYCERIN (NITROSTAT) 0.4 MG SL tablet Place 1 tablet (0.4 mg total) under the tongue every 5 (five) minutes as needed for Chest pain.  Qty: 25 tablet, Refills: 6    Associated Diagnoses: Coronary artery disease involving native coronary artery of native heart with unstable angina pectoris      pantoprazole (PROTONIX) 40 MG tablet Take 40 mg by mouth once daily.      spironolactone (ALDACTONE) 25 MG tablet Take 1 tablet (25 mg total) by mouth once daily.  Qty: 90 tablet, Refills: 3    Comments: .      aspirin 81 MG Chew Take 1 tablet (81 mg total) by mouth once daily.  Refills: 0      LORazepam (ATIVAN) 0.5 MG tablet Take 1 tablet (0.5 mg total) by mouth every 8 (eight) hours as needed for Anxiety.  Qty: 30 tablet, Refills: 0    Associated Diagnoses: DERECK (generalized anxiety disorder)           STOP taking these medications       tamsulosin (FLOMAX) 0.4 mg Cap Comments:   Reason for Stopping:                 I have seen and examined this patient within the last 30 days. My clinical findings that support the need for the home health skilled services and home bound status are the following:no   Weakness/numbness causing balance and gait disturbance due to Heart Failure and Weakness/Debility making it taxing to leave home.     Diet:   cardiac diet    Labs:  NA    Referrals/ Consults  Physical Therapy to evaluate and treat. Evaluate for home safety and equipment needs; Establish/upgrade home exercise program. Perform / instruct on  therapeutic exercises, gait training, transfer training, and Range of Motion.  Occupational Therapy to evaluate and treat. Evaluate home environment for safety and equipment needs. Perform/Instruct on transfers, ADL training, ROM, and therapeutic exercises.    Activities:   activity as tolerated    Nursing:   Agency to admit patient within 24 hours of hospital discharge unless specified on physician order or at patient request    SN to complete comprehensive assessment including routine vital signs. Instruct on disease process and s/s of complications to report to MD. Review/verify medication list sent home with the patient at time of discharge  and instruct patient/caregiver as needed. Frequency may be adjusted depending on start of care date.     Skilled nurse to perform up to 3 visits PRN for symptoms related to diagnosis    Notify MD if SBP > 160 or < 90; DBP > 90 or < 50; HR > 120 or < 50; Temp > 101; O2 < 88%    Ok to schedule additional visits based on staff availability and patient request on consecutive days within the home health episode.    When multiple disciplines ordered:    Start of Care occurs on Sunday - Wednesday schedule remaining discipline evaluations as ordered on separate consecutive days following the start of care.    Thursday SOC -schedule subsequent evaluations Friday and Monday the following week.     Friday - Saturday SOC - schedule subsequent discipline evaluations on consecutive days starting Monday of the following week.    For all post-discharge communication and subsequent orders please contact patient's primary care physician.     Miscellaneous   NA    Home Health Aide:  Nursing Three times weekly, Physical Therapy Three times weekly, and Occupational Therapy Three times weekly    Wound Care Orders  no    I certify that this patient is confined to his home and needs intermittent skilled nursing care, physical therapy, and occupational therapy.

## 2023-04-26 NOTE — ASSESSMENT & PLAN NOTE
Lab Results   Component Value Date    TROPONINI 0.033 (H) 12/19/2016     (H) 04/21/2023     Results for orders placed during the hospital encounter of 02/21/23  Echo  Interpretation Summary  · The left ventricle is mildly enlarged with moderate concentric hypertrophy evidence of old apical scar seen on apical four-chamber view  · The estimated ejection fraction is 34%.  · Grade III left ventricular diastolic dysfunction. Mean left atrial pressure 11  · There are segmental left ventricular wall motion abnormalities.  · Atrial fibrillation not observed.  · Normal right ventricular size with mildly reduced right ventricular systolic function.  · Mild-to-moderate aortic regurgitation.  · Severe mitral regurgitation.  · Intermediate central venous pressure (8 mmHg).  · The estimated PA systolic pressure is 30 mmHg. Mild pulmonary hypertension is present    - continue lisinopril, metoprolol, spironolactone  - continue asa, statin  - continue lasix 40 mg PO daily   - No SGLT2 due to price

## 2023-04-26 NOTE — ASSESSMENT & PLAN NOTE
67 y.o. male with CAD s/p stent (though patient does not recall), HFrEF (35%) s/p AICD, hyperlipidemia, hypertension, tobacco use who presents for nonsustained VT.  Stress test with  large fixed hypokinetic defect in apex and anterior wall.  Started on amio drip, converted to amio 200 mg TID, however patient continued to have intermittent VT so started on lidocaine drip.  On arrival, patient in VT with HR 130s, spontaneously converted to NSR.  HDS with BP 100s systolic throughout.     - S/P ablation on 4/24  - Amio 400mg daily   - continue ASA 81 q daily for at least 30 days   - TTE on 4/24 with no LV thrombus or aneurysm, EF 35%  - FU with Dr. Grace in 3-months

## 2023-04-26 NOTE — PLAN OF CARE
Patient discharging home with [facility] to follow .     Shemar Manzo RN CM   [phone]     04/26/23 1317   Final Note   Assessment Type Final Discharge Note   Anticipated Discharge Disposition Home-Health   What phone number can be called within the next 1-3 days to see how you are doing after discharge? [phone]   Hospital Resources/Appts/Education Provided Provided patient/caregiver with written discharge plan information;Appointments scheduled and added to AVS;Appointments scheduled by Navigator/Coordinator   Post-Acute Status   Home Health Status Set-up Complete/Auth obtained   Discharge Delays None known at this time       Future Appointments   Date Time Provider Department Center   5/8/2023 10:00 AM James Garcia MD [facility]

## 2023-04-26 NOTE — PT/OT/SLP EVAL
Physical Therapy Evaluation    Patient Name:  Brandon Rivas   MRN:  [id number]    Recommendations:     Discharge Recommendations: other (see comments)   Discharge Equipment Recommendations: none   Barriers to discharge: None    Assessment:     Brandon Rivas is a 67 y.o. male admitted with a medical diagnosis of Ventricular tachycardia. Pt is s/p ablation Pt tolerated activity well, with no adverse effects.  He presents with the following impairments/functional limitations: impaired endurance, impaired functional mobility, gait instability, impaired balance, pain, impaired cardiopulmonary response to activity.    Rehab Prognosis: Good; patient would benefit from acute skilled PT services to address these deficits and reach maximum level of function.      Recent Surgery: Procedure(s) (LRB):  Ablation, Scar VT (N/A) 2 Days Post-Op    Plan:     During this hospitalization, patient to be seen 3 x/week to address the identified rehab impairments via gait training, therapeutic activities, therapeutic exercises, neuromuscular re-education and progress toward the following goals:    Plan of Care Expires:  05/26/23    Subjective     Chief Complaint: headache and nausea (RN notified)   Patient/Family Comments/goals: Pt   Pain/Comfort:  Pain Rating 1:  (did not rate)  Location 1: head  Pain Addressed 1: Distraction, Nurse notified    Patients cultural, spiritual, Caodaism conflicts given the current situation:  none     Living Environment/PLOF:  Pt reported that prior to a couple of weeks ago he was living alone. He recently moved in with his brother and plans to stay with his brother upon d/c. Home is a [facility] with ramp to enter.   Prior to admission, patients level of function was (I) with mobility and ADLs.  Equipment used at home: none.  DME owned (not currently used): none.  Upon discharge, patient will have assistance from brother.    Objective:     Communicated with RN prior to session.  Patient found HOB  elevated with telemetry  upon PT entry to room.    General Precautions: Standard, fall  Orthopedic Precautions:N/A   Braces: N/A  Respiratory Status: Room air    Exams:  Cognitive Exam:  Patient is oriented to Person, Place, Time, and Situation  Sensation:    -       Intact  light/touch BLEs  Skin Integrity/Edema:      -       Skin integrity: Visible skin intact  -       Edema: None noted BLEs  RLE ROM: WFL  RLE Strength: WFL  LLE ROM: WFL  LLE Strength: WFL    Functional Mobility:    Bed Mobility:   Supine > Sit: supervision  Sit > Supine:  N/T    Transfers:   Sit <> Stand Transfer: supervision from EOB without AD    Balance:   Standing balance:   FAIR+: Maintains without assist but unable to take challenges  FAIR+: Needs CLOSE SUPERVISION during gait and is able to right self with minor LOB                 Gait:  Distance: ~120 ft.    Assistive Device: no AD   Assistance Level: stand by assistance   Gait Assessment: reciprocal gait pattern with decreased gait speed, mild increased postural sway. No overt LOB. Pt with mildly increased work of breathing noted, but denied any SOB.       AM-PAC 6 CLICK MOBILITY  Total Score:20       Treatment & Education:  Pt educated on:  - Role of PT and POC/goals for therapy   - seated therex recommendations   - Safety with mobility  - Instructed to call nursing staff for assistance with mobility as needed 2/2 fall risk     Pt verbalized understanding and expressed no further concerns/questions.     Patient left sitting edge of bed with all lines intact, call button in reach, RN notified, and MD team present.    GOALS:   Multidisciplinary Problems       Physical Therapy Goals          Problem: Physical Therapy    Goal Priority Disciplines Outcome Goal Variances Interventions   Physical Therapy Goal     PT, PT/OT Ongoing, Progressing     Description: Goals to be met by: 5/10/23     Patient will increase functional independence with mobility by performin. Sit to stand transfer  with [place]  2. Gait  x 250 feet with Modified [place] without AD, with no LOB   3. Lower extremity exercise program x20 reps per handout, with independence                         History:     Past Medical History:   Diagnosis Date    Coronary artery disease     Hypertension     MI (myocardial infarction)     Thyroid disease        Past Surgical History:   Procedure Laterality Date    ABLATION, SCAR VT N/A 4/24/2023    Procedure: Ablation, Scar VT;  Surgeon: Helder Grace MD;  Location: [facility] EP LAB;  Service: Cardiology;  Laterality: N/A;  VT, VT RFA, CARTO, ANES, GP, 3080, PENDING ECHO    CARDIAC PACEMAKER PLACEMENT         Time Tracking:     PT Received On: 04/26/23  PT Start Time: 0959     PT Stop Time: 1009  PT Total Time (min): 10 min     Billable Minutes: Evaluation 10 min      04/26/2023

## 2023-04-26 NOTE — PT/OT/SLP EVAL
Occupational Therapy   Evaluation    Name: Brandon Rivas  MRN: [id number]  Admitting Diagnosis: Ventricular tachycardia  Recent Surgery: Procedure(s) (LRB):  Ablation, Scar VT (N/A) 2 Days Post-Op    Recommendations:     Discharge Recommendations: other (see comments)  Discharge Equipment Recommendations:  none  Barriers to discharge:  None    Assessment:     Brandon Rivas is a 67 y.o. male with a medical diagnosis of Ventricular tachycardia.  He presents with c/o of new onset of impaired visual acuity. Pt tolerated session well, with c/o of nausea and anxiousness. Pt appears to be near baseline, however demonstrates mild unsteadiness during mobility limiting his ability to complete ADLs without assistance.  Performance deficits affecting function: impaired endurance, impaired self care skills, gait instability, impaired functional mobility, pain, impaired cardiopulmonary response to activity.  Pt would benefit from skilled OT services in order to maximize independence with ADLs and facilitate safe discharge.     Rehab Prognosis: Good; patient would benefit from acute skilled OT services to address these deficits and reach maximum level of function.       Plan:     Patient to be seen 2 x/week to address the above listed problems via self-care/home management, therapeutic activities, therapeutic exercises  Plan of Care Expires: 05/26/23  Plan of Care Reviewed with: patient    Subjective     Chief Complaint: impaired vision  Patient/Family Comments/goals: to return home    Occupational Profile:  Living Environment: Pt lives alone but will discharge to his brother's [facility] which is handicap accessible  Previous level of function: independent  Roles and Routines: active, walks everyday, drives, does not work  Equipment Used at Home: none  Assistance upon Discharge: Upon discharge, pt will have assist from family    Pain/Comfort:  Pain Rating 1:  (unrated head pain)    Patients cultural, spiritual, Mosque  conflicts given the current situation: no    Objective:     Communicated with: RN and PT prior to session.  Patient found HOB elevated with telemetry upon OT entry to room.    General Precautions: Standard, fall  Orthopedic Precautions: N/A  Braces: N/A  Respiratory Status: Room air    Occupational Performance:    Bed Mobility:    Patient completed Supine to Sit with supervision    Functional Mobility/Transfers:  Patient completed Sit <> Stand Transfer with supervision  with  no assistive device   Functional Mobility: Pt ambulated 100 ft with SBA and no AD in order to maximize functional activity tolerance and standing balance required for engagement in occupations of choice.      Activities of Daily Living:  Grooming: independence to perform facial hygiene    Cognitive/Visual Perceptual:  Cognitive/Psychosocial Skills:     -       Oriented to: Person, Place, Time, and Situation   -       Follows Commands/attention:Follows multistep  commands  -       Communication: clear/fluent  -       Memory: No Deficits noted  -       Safety awareness/insight to disability: intact   -       Mood/Affect/Coping skills/emotional control: Appropriate to situation    Physical Exam:  Sensation:    -       Intact  Upper Extremity Range of Motion:     -       Right Upper Extremity: WFL  -       Left Upper Extremity: WFL  Upper Extremity Strength:    -       Right Upper Extremity: WFL  -       Left Upper Extremity: WFL   Strength:    -       Right Upper Extremity: WFL  -       Left Upper Extremity: WFL  Fine Motor Coordination:    -       Intact    AMPAC 6 Click ADL:  AMPAC Total Score: 21    Treatment & Education:  -Therapist provided facilitation and instruction of proper body mechanics, energy conservation, and fall prevention strategies during tasks listed above.  -Pt educated on role of OT, POC and goals for therapy  -Pt educated on importance of OOB activities with staff member assistance and sitting OOB majority of the day.    -Pt verbalized understanding. Pt expressed no further concerns/questions  -Whiteboard updated  Evaluation completed with PT to best establish plan of care for acute setting.     Patient left sitting edge of bed with all lines intact, call button in reach, and RN present    GOALS:   Multidisciplinary Problems       Occupational Therapy Goals          Problem: Occupational Therapy    Goal Priority Disciplines Outcome Interventions   Occupational Therapy Goal     OT, PT/OT Ongoing, Progressing    Description: Goals to be met by: 5/10/23     Patient will increase functional independence with ADLs by performing:    Feeding with [place].  UE Dressing with [place].  LE Dressing with Supervision.  Grooming while standing at sink with Supervision.  Toileting from toilet with Supervision for hygiene and clothing management.   Toilet transfer to toilet with Supervision.                         History:     Past Medical History:   Diagnosis Date    Coronary artery disease     Hypertension     MI (myocardial infarction)     Thyroid disease          Past Surgical History:   Procedure Laterality Date    ABLATION, SCAR VT N/A 4/24/2023    Procedure: Ablation, Scar VT;  Surgeon: Helder Grace MD;  Location: [facility] EP LAB;  Service: Cardiology;  Laterality: N/A;  VT, VT RFA, CARTO, ANES, GP, 3080, PENDING ECHO    CARDIAC PACEMAKER PLACEMENT         Time Tracking:     OT Date of Treatment: 04/26/23  OT Start Time: 0959  OT Stop Time: 1009  OT Total Time (min): 10 min    Billable Minutes:Evaluation 10    4/26/2023

## 2023-04-26 NOTE — PLAN OF CARE
Pt educated on fall risk, pt remained free from falls/trauma/injury. Denies chest pain, SOB, palpitations or dizziness. Plan of care reviewed with pt. Bed locked in lowest position, call bell within reach, no acute distress noted, will continue to monitor.  No acute events overnight. Bilateral groins soft, no hematoma or signs of bleeding.     Problem: Adult Inpatient Plan of Care  Goal: Plan of Care Review  Outcome: Ongoing, Progressing  Goal: Patient-Specific Goal (Individualized)  Outcome: Ongoing, Progressing  Goal: Absence of Hospital-Acquired Illness or Injury  Outcome: Ongoing, Progressing  Goal: Optimal Comfort and Wellbeing  Outcome: Ongoing, Progressing  Goal: Readiness for Transition of Care  Outcome: Ongoing, Progressing     Problem: Infection  Goal: Absence of Infection Signs and Symptoms  Outcome: Ongoing, Progressing     Problem: Skin Injury Risk Increased  Goal: Skin Health and Integrity  Outcome: Ongoing, Progressing

## 2023-04-26 NOTE — PLAN OF CARE
Problem: Occupational Therapy  Goal: Occupational Therapy Goal  Description: Goals to be met by: 5/10/23     Patient will increase functional independence with ADLs by performing:    Feeding with [place].  UE Dressing with [place].  LE Dressing with Supervision.  Grooming while standing at sink with Supervision.  Toileting from toilet with Supervision for hygiene and clothing management.   Toilet transfer to toilet with Supervision.    Outcome: Ongoing, Progressing

## 2023-04-26 NOTE — PLAN OF CARE
Plan of Care:  PT evaluation completed- see note for details. Goals and POC established.     Please continue Progressive Mobility Protocol as appropriate.    2023    Physical Therapy Treatment Goals:  Multidisciplinary Problems       Physical Therapy Goals          Problem: Physical Therapy    Goal Priority Disciplines Outcome Goal Variances Interventions   Physical Therapy Goal     PT, PT/OT Ongoing, Progressing     Description: Goals to be met by: 5/10/23     Patient will increase functional independence with mobility by performin. Sit to stand transfer with [place]  2. Gait  x 250 feet with Modified [place] without AD, with no LOB   3. Lower extremity exercise program x20 reps per handout, with independence

## 2023-04-26 NOTE — NURSING
Patient is ready for discharge. Brother (Jerzy Rivas) at bedside. Patient stable alert and oriented. IVs removed. VSS. Telemonitor removed. No complaints of pain. MD Flo at bedside to talk to patient and brother regarding discharge plan. Discussed discharge plan. Reviewed medications and side effects, appointments, and answered questions with patient and brother. Verbalized understanding. No further questions. Awaiting [facility] transport service for patient discharge.

## 2023-04-26 NOTE — DISCHARGE SUMMARY
[facility] - Cardiology Stepdown  Cardiology  Discharge Summary      Patient Name: Brandon Rivas  MRN: [id number]  Admission Date: 4/22/2023  Hospital Length of Stay: 4 days  Discharge Date and Time:  04/26/2023 2:08 PM  Attending Physician: Steven Mckeon MD    Discharging Provider: Ramesh Rossi MD  Primary Care Physician: James Garcia MD    HPI:   Mr. Brandon Rivas is a 67 y.o. male with CAD s/p stent (though patient does not recall), HFrEF (35%) s/p AICD, hyperlipidemia, hypertension, tobacco use who initially presented to [facility] on 4/15/2023 for chest pain, palpitations, and shortness of breath.  Reports AICD firing a week before, and has had intermittent palpitations with chest pain since then.  Questionable compliance on lasix and amiodarone.  HDS but EKG on admit was VT vs SVT, given mag, metoprolol 5 mg IV, amio 150 mg push, started on infusion with conversion to NSR.   Interogation of AICD revealed no arrhythmias since last interrogation.  Patient was discharged the next day on amio TID, however returned several hours later with palpitations, found to be in monomorphic VT.  Patient was subsequently readmitted and started on amio drip with improvement.  Stress SPECT on 4/18 showed large fixed hypokinetic defect in apex and anterior wall, with dilated left ventricle and no reversible ischemia.  No previous cardiac cath in records.  Hospitalization complicated by refusal of medications and anxiety.  Patient continued to have nonsustained VT with HR in 130s throughout admission, transferred to [facility] for EP evaluation and possible VT ablation.      Procedure(s) (LRB):  Ablation, Scar VT (N/A)     Indwelling Lines/Drains at time of discharge:  Lines/Drains/Airways     None                 Hospital Course:  Admitted to CCU for monomorphic VT.  Patient initially in VT with rates 120s, converted to sinus michelle (HR 50s) on admission.  Reloaded amio IV at 1mg/h and started  on a lidocaine infusion.  Went back into VT to 120s, paced out of VT at bedside.  EP consulted, patient underwent ablation on 4/24 for VT arising from apical/apical anterior LV. After the procedure patients HR remained in the 40-50's. Lidocaine was stopped and he was switched to PO amio. Metoprolol dose lowered to 12.5. Patient to follow-up with EP Dr. Grace in 3-months. He is on lisinopril, lasix, Toprol, and spironolactone for his GDMT. Patient is not being sent home with a SGLT2 due to price. Patient with significant anxiety about his health, starting him on zoloft. He has HH PT, OT, and a nurse that will visit him at home.     Physical Exam  Constitutional:       General: He is not in acute distress.     Appearance: Normal appearance.   HENT:      Mouth/Throat:      Comments: Poor dentition  Eyes:      Extraocular Movements: Extraocular movements intact.      Pupils: Pupils are equal, round, and reactive to light.   Cardiovascular:      Rate and Rhythm: Normal rate and regular rhythm.      Pulses: Normal pulses.      Heart sounds: Normal heart sounds.   Pulmonary:      Effort: Pulmonary effort is normal.      Breath sounds: Normal breath sounds.   Abdominal:      General: Abdomen is flat. Bowel sounds are normal. There is no distension.      Palpations: Abdomen is soft.      Tenderness: There is no abdominal tenderness. There is no guarding.      Hernia: No hernia is present.   Musculoskeletal:      Right lower leg: No edema.      Left lower leg: No edema.   Skin:     Capillary Refill: Capillary refill takes less than 2 seconds.      Findings: No lesion.   Neurological:      General: No focal deficit present.      Mental Status: He is alert and oriented to person, place, and time.   Psychiatric:         Mood and Affect: Mood is anxious.       Goals of Care Treatment Preferences:  Code Status: Full Code      Consults:   Consults (From admission, onward)        Status Ordering Provider     Inpatient consult to  Electrophysiology  Once        Provider:  (Not yet assigned)    Completed SILKE          Significant Diagnostic Studies: Labs: All labs within the past 24 hours have been reviewed    Pending Diagnostic Studies:     None          Final Active Diagnoses:    Diagnosis Date Noted POA    PRINCIPAL PROBLEM:  Ventricular tachycardia [I47.20] 03/24/2022 Yes    Hypertension [I10] 12/18/2016 Yes    Hyperlipidemia [E78.5] 12/18/2016 Yes    Tobacco dependence [F17.200] 12/18/2016 Yes    Chronic combined systolic and diastolic heart failure [I50.42] 12/18/2016 Yes      Problems Resolved During this Admission:     Cardiac/Vascular  * Ventricular tachycardia  67 y.o. male with CAD s/p stent (though patient does not recall), HFrEF (35%) s/p AICD, hyperlipidemia, hypertension, tobacco use who presents for nonsustained VT.  Stress test with  large fixed hypokinetic defect in apex and anterior wall.  Started on amio drip, converted to amio 200 mg TID, however patient continued to have intermittent VT so started on lidocaine drip.  On arrival, patient in VT with HR 130s, spontaneously converted to NSR.  HDS with BP 100s systolic throughout.     - S/P ablation on 4/24  - Amio 400mg daily   - continue ASA 81 q daily for at least 30 days   - TTE on 4/24 with no LV thrombus or aneurysm, EF 35%  - FU with Dr. Grace in 3-months     Chronic combined systolic and diastolic heart failure    Lab Results   Component Value Date    TROPONINI 0.033 (H) 12/19/2016     (H) 04/21/2023     Results for orders placed during the hospital encounter of 02/21/23  Echo  Interpretation Summary  · The left ventricle is mildly enlarged with moderate concentric hypertrophy evidence of old apical scar seen on apical four-chamber view  · The estimated ejection fraction is 34%.  · Grade III left ventricular diastolic dysfunction. Mean left atrial pressure 11  · There are segmental left ventricular wall motion abnormalities.  · Atrial fibrillation  not observed.  · Normal right ventricular size with mildly reduced right ventricular systolic function.  · Mild-to-moderate aortic regurgitation.  · Severe mitral regurgitation.  · Intermediate central venous pressure (8 mmHg).  · The estimated PA systolic pressure is 30 mmHg. Mild pulmonary hypertension is present    - continue lisinopril, metoprolol, spironolactone  - continue asa, statin  - continue lasix 40 mg PO daily   - No SGLT2 due to price         Discharged Condition: stable    Disposition: Home or Self Care    Follow Up:    Patient Instructions:      Ambulatory referral/consult to Cardiac Electrophysiology   Standing Status: Future   Referral Priority: Routine Referral Type: Consultation   Referral Reason: Specialty Services Required   Referred to Provider: KATERINA MARTIN Requested Specialty: Cardiology   Number of Visits Requested: 1     Medications:  Reconciled Home Medications:      Medication List      START taking these medications    magnesium oxide 200 mg magnesium Tab  Take 200 mg by mouth once daily.     sertraline 50 MG tablet  Commonly known as: ZOLOFT  Take 1 tablet (50 mg total) by mouth every evening.        CHANGE how you take these medications    amiodarone 200 MG Tab  Commonly known as: PACERONE  Take 2 tablets (400 mg total) by mouth once daily.  What changed:   · how much to take  · when to take this     lisinopriL 5 MG tablet  Commonly known as: PRINIVIL,ZESTRIL  Take 1 tablet (5 mg total) by mouth once daily.  What changed:   · medication strength  · how much to take     metoprolol succinate 25 MG 24 hr tablet  Commonly known as: TOPROL-XL  Take 0.5 tablets (12.5 mg total) by mouth once daily.  What changed:   · medication strength  · how much to take     potassium chloride 10 MEQ Tbsr  Commonly known as: KLOR-CON  Take 1 tablet (10 mEq total) by mouth once daily.  What changed: when to take this        CONTINUE taking these medications    aspirin 81 MG Chew  Take 1 tablet (81 mg  total) by mouth once daily.     butalbital-acetaminophen-caffeine -40 mg -40 mg per tablet  Commonly known as: FIORICET, ESGIC  Take 1 tablet by mouth every 4 (four) hours as needed for Headaches.     furosemide 40 MG tablet  Commonly known as: LASIX  Take 1 tablet (40 mg total) by mouth once daily.     LORazepam 0.5 MG tablet  Commonly known as: ATIVAN  Take 1 tablet (0.5 mg total) by mouth every 8 (eight) hours as needed for Anxiety.     nitroGLYCERIN 0.4 MG SL tablet  Commonly known as: NITROSTAT  Place 1 tablet (0.4 mg total) under the tongue every 5 (five) minutes as needed for Chest pain.     pantoprazole 40 MG tablet  Commonly known as: PROTONIX  Take 40 mg by mouth once daily.     spironolactone 25 MG tablet  Commonly known as: ALDACTONE  Take 1 tablet (25 mg total) by mouth once daily.        STOP taking these medications    tamsulosin 0.4 mg Cap  Commonly known as: FLOMAX            Time spent on the discharge of patient: 45 minutes    Ramesh Rossi MD  Cardiology  [facility] - Cardiology Stepdown

## 2023-04-27 ENCOUNTER — PATIENT OUTREACH (OUTPATIENT)
Dept: ADMINISTRATIVE | Facility: CLINIC | Age: 68
End: 2023-04-27
Payer: MEDICARE

## 2023-04-27 PROCEDURE — G0180 PR HOME HEALTH MD CERTIFICATION: ICD-10-PCS | Mod: ,,, | Performed by: INTERNAL MEDICINE

## 2023-04-27 PROCEDURE — G0180 MD CERTIFICATION HHA PATIENT: HCPCS | Mod: ,,, | Performed by: INTERNAL MEDICINE

## 2023-05-09 ENCOUNTER — OFFICE VISIT (OUTPATIENT)
Dept: FAMILY MEDICINE | Facility: CLINIC | Age: 68
End: 2023-05-09
Payer: MEDICARE

## 2023-05-09 ENCOUNTER — TELEPHONE (OUTPATIENT)
Dept: FAMILY MEDICINE | Facility: CLINIC | Age: 68
End: 2023-05-09

## 2023-05-09 VITALS
SYSTOLIC BLOOD PRESSURE: 128 MMHG | DIASTOLIC BLOOD PRESSURE: 72 MMHG | WEIGHT: 203 LBS | HEIGHT: 73 IN | OXYGEN SATURATION: 97 % | RESPIRATION RATE: 16 BRPM | HEART RATE: 56 BPM | BODY MASS INDEX: 26.9 KG/M2

## 2023-05-09 DIAGNOSIS — Z86.79 STATUS POST ABLATION OPERATION FOR ARRHYTHMIA: ICD-10-CM

## 2023-05-09 DIAGNOSIS — Z86.79 S/P ABLATION OF ATRIAL FIBRILLATION: ICD-10-CM

## 2023-05-09 DIAGNOSIS — I25.110 CORONARY ARTERY DISEASE INVOLVING NATIVE CORONARY ARTERY OF NATIVE HEART WITH UNSTABLE ANGINA PECTORIS: ICD-10-CM

## 2023-05-09 DIAGNOSIS — Z95.810 AICD (AUTOMATIC CARDIOVERTER/DEFIBRILLATOR) PRESENT: ICD-10-CM

## 2023-05-09 DIAGNOSIS — T46.6X5A MYALGIA DUE TO STATIN: ICD-10-CM

## 2023-05-09 DIAGNOSIS — Z98.890 STATUS POST ABLATION OPERATION FOR ARRHYTHMIA: ICD-10-CM

## 2023-05-09 DIAGNOSIS — R79.89 AZOTEMIA: ICD-10-CM

## 2023-05-09 DIAGNOSIS — M79.10 MYALGIA DUE TO STATIN: ICD-10-CM

## 2023-05-09 DIAGNOSIS — I47.20 VENTRICULAR TACHYCARDIA: ICD-10-CM

## 2023-05-09 DIAGNOSIS — D64.9 ANEMIA, UNSPECIFIED TYPE: ICD-10-CM

## 2023-05-09 DIAGNOSIS — Z98.890 S/P ABLATION OF ATRIAL FIBRILLATION: ICD-10-CM

## 2023-05-09 DIAGNOSIS — R10.13 EPIGASTRIC PAIN: ICD-10-CM

## 2023-05-09 DIAGNOSIS — Z79.899 ENCOUNTER FOR LONG-TERM (CURRENT) USE OF OTHER MEDICATIONS: Primary | ICD-10-CM

## 2023-05-09 DIAGNOSIS — I73.9 PVD (PERIPHERAL VASCULAR DISEASE): ICD-10-CM

## 2023-05-09 DIAGNOSIS — F41.1 GAD (GENERALIZED ANXIETY DISORDER): ICD-10-CM

## 2023-05-09 LAB
ALBUMIN SERPL BCP-MCNC: 4.1 G/DL (ref 3.5–5.2)
ALP SERPL-CCNC: 78 U/L (ref 55–135)
ALT SERPL W/O P-5'-P-CCNC: 20 U/L (ref 10–44)
ANION GAP SERPL CALC-SCNC: 9 MMOL/L (ref 8–16)
AST SERPL-CCNC: 17 U/L (ref 10–40)
BASOPHILS # BLD AUTO: 0.06 K/UL (ref 0–0.2)
BASOPHILS NFR BLD: 0.9 % (ref 0–1.9)
BILIRUB SERPL-MCNC: 0.6 MG/DL (ref 0.1–1)
BUN SERPL-MCNC: 12 MG/DL (ref 8–23)
CALCIUM SERPL-MCNC: 9.5 MG/DL (ref 8.7–10.5)
CHLORIDE SERPL-SCNC: 105 MMOL/L (ref 95–110)
CO2 SERPL-SCNC: 21 MMOL/L (ref 23–29)
CREAT SERPL-MCNC: 0.9 MG/DL (ref 0.5–1.4)
DIFFERENTIAL METHOD: ABNORMAL
EOSINOPHIL # BLD AUTO: 0.1 K/UL (ref 0–0.5)
EOSINOPHIL NFR BLD: 1.9 % (ref 0–8)
ERYTHROCYTE [DISTWIDTH] IN BLOOD BY AUTOMATED COUNT: 14.1 % (ref 11.5–14.5)
EST. GFR  (NO RACE VARIABLE): >60 ML/MIN/1.73 M^2
GLUCOSE SERPL-MCNC: 101 MG/DL (ref 70–110)
HCT VFR BLD AUTO: 40.1 % (ref 40–54)
HGB BLD-MCNC: 13 G/DL (ref 14–18)
IMM GRANULOCYTES # BLD AUTO: 0.01 K/UL (ref 0–0.04)
IMM GRANULOCYTES NFR BLD AUTO: 0.2 % (ref 0–0.5)
LYMPHOCYTES # BLD AUTO: 1 K/UL (ref 1–4.8)
LYMPHOCYTES NFR BLD: 15.6 % (ref 18–48)
MCH RBC QN AUTO: 30.9 PG (ref 27–31)
MCHC RBC AUTO-ENTMCNC: 32.4 G/DL (ref 32–36)
MCV RBC AUTO: 95 FL (ref 82–98)
MONOCYTES # BLD AUTO: 0.5 K/UL (ref 0.3–1)
MONOCYTES NFR BLD: 7.7 % (ref 4–15)
NEUTROPHILS # BLD AUTO: 4.8 K/UL (ref 1.8–7.7)
NEUTROPHILS NFR BLD: 73.7 % (ref 38–73)
NRBC BLD-RTO: 0 /100 WBC
PLATELET # BLD AUTO: 241 K/UL (ref 150–450)
PMV BLD AUTO: 11.6 FL (ref 9.2–12.9)
POTASSIUM SERPL-SCNC: 4.3 MMOL/L (ref 3.5–5.1)
PROT SERPL-MCNC: 7.5 G/DL (ref 6–8.4)
RBC # BLD AUTO: 4.21 M/UL (ref 4.6–6.2)
SODIUM SERPL-SCNC: 135 MMOL/L (ref 136–145)
WBC # BLD AUTO: 6.46 K/UL (ref 3.9–12.7)

## 2023-05-09 PROCEDURE — 99214 PR OFFICE/OUTPT VISIT, EST, LEVL IV, 30-39 MIN: ICD-10-PCS | Mod: S$GLB,,, | Performed by: INTERNAL MEDICINE

## 2023-05-09 PROCEDURE — 80053 COMPREHEN METABOLIC PANEL: CPT | Performed by: INTERNAL MEDICINE

## 2023-05-09 PROCEDURE — 85025 COMPLETE CBC W/AUTO DIFF WBC: CPT | Performed by: INTERNAL MEDICINE

## 2023-05-09 PROCEDURE — 99214 OFFICE O/P EST MOD 30 MIN: CPT | Mod: S$GLB,,, | Performed by: INTERNAL MEDICINE

## 2023-05-09 RX ORDER — PANTOPRAZOLE SODIUM 40 MG/1
40 TABLET, DELAYED RELEASE ORAL DAILY
Qty: 90 TABLET | Refills: 3 | Status: SHIPPED | OUTPATIENT
Start: 2023-05-09 | End: 2024-05-03

## 2023-05-09 RX ORDER — LORAZEPAM 0.5 MG/1
0.5 TABLET ORAL EVERY 12 HOURS PRN
Qty: 60 TABLET | Refills: 0 | Status: SHIPPED | OUTPATIENT
Start: 2023-05-09 | End: 2023-06-08

## 2023-05-09 RX ORDER — ONDANSETRON 4 MG/1
4 TABLET, ORALLY DISINTEGRATING ORAL ONCE
COMMUNITY

## 2023-05-09 RX ORDER — SERTRALINE HYDROCHLORIDE 100 MG/1
TABLET, FILM COATED ORAL
Qty: 90 TABLET | Refills: 3 | Status: SHIPPED | OUTPATIENT
Start: 2023-05-09

## 2023-05-09 NOTE — TELEPHONE ENCOUNTER
Called and spoke to patient's caregiver and brother Jerzy to report per Dr. Tolbert that CBC and CMP results from today are unremarkable. Jerzy verbalized understanding. Instructed to call with any questions or concerns. CHANTEL Wong

## 2023-05-09 NOTE — PROGRESS NOTES
Subjective     Patient ID: Brandon Rivas is a 67 y.o. male.    Chief Complaint: Follow-up (Pt presents to the clinic for 2m f/u ablation 4/27/23)    Patient presents in the company of his brother Jerzy who is his primary caregiver he will actually be moving with Jerzy to [place], [place] so this will be the last time that the patient is seen by me here.  He will also be getting established with Dr. Miguel Saenz and Dr. Parish Saenz for primary care and cardiology follow-up respectively.  Referral to Dr. Parish Saenz has been issued by me here now and they will call the patient with regards to final scheduling details.      I have made it a point to let him know that the prescription for the sertraline will be issued now good for a year but for the Ativan that he takes 0.5 mg 1 by mouth twice a day he will be for 60 tablets with no refill.  He needs to get established with Dr. Miguel Saenz to continue this therapy.      As it pertains to the amiodarone I do not prescribe that for him and should be coming from Cardiology because of the type of drug and the follow-up that it requires.  Patient is currently being loaded with amiodarone at 400 mg by mouth every day.      He claims that the Klor-Con tablets are not dissolving in his system and is passing him whole and refuses to take same.  He just been supplementing his potassium with the diet reason why the comprehensive metabolic panel is to be drawn now.  He is also taking magnesium supplement over-the-counter.    Vital signs are otherwise stable.      There has not been anymore AICD firing.      He is in normal sinus rhythm since the atrial fibrillation ablation.      As it pertains to anti thrombotic therapy he is only on aspirin not on Coumadin or novel oral anticoagulant agents.  There are no signs of bleeding complications.  A CBC is been drawn now.      No care gaps were addressed otherwise.      Regarding his abdominal pain which is chronic the  Protonix is to be continued as issued.      For management of his generalized anxiety disorder patient thinks that he is benefitting from the increasing the sertraline to 100 mg dose so a prescription for sertraline 100 mg 1 by mouth every night number 90 with 3 refills has been issued trying to make it easier on his transition to a new doctor.  No other prescription was issued today besides the Protonix, sertraline on lorazepam.    He has not experienced any angina like symptomatology or any syncope like symptoms either.    Patient has very significant pressure speech today have made it more difficult to be able to try and sort through his complaints.    Patient is concerned about potential dehydration because of the Lasix therapy so he has been cutting the Lasix 40 mg to half a tablet.    His brother Jerzy is trying to help with his compliance to the therapy but patient is changing it on his own.    Review of Systems   All other systems reviewed and are negative.       Objective     Physical Exam  Vitals and nursing note reviewed. Exam conducted with a chaperone present.   Constitutional:       General: He is not in acute distress.     Appearance: Normal appearance. He is normal weight. He is not ill-appearing, toxic-appearing or diaphoretic.   HENT:      Head: Normocephalic and atraumatic.   Cardiovascular:      Rate and Rhythm: Normal rate and regular rhythm.      Pulses: Normal pulses.   Pulmonary:      Effort: Pulmonary effort is normal.   Musculoskeletal:      Right lower leg: No edema.      Left lower leg: No edema.   Skin:     General: Skin is warm and dry.      Coloration: Skin is not jaundiced or pale.   Neurological:      Mental Status: He is alert. Mental status is at baseline.      Cranial Nerves: No cranial nerve deficit.      Sensory: No sensory deficit.      Motor: No weakness.      Coordination: Coordination normal.      Gait: Gait normal.          Assessment and Plan     Problem List Items  Addressed This Visit          Cardiac/Vascular    PVD (peripheral vascular disease)    Ventricular tachycardia     Other Visit Diagnoses       Encounter for long-term (current) use of other medications    -  Primary    Anemia, unspecified type        DERECK (generalized anxiety disorder)        AICD (automatic cardioverter/defibrillator) present        Myalgia due to statin        S/P ablation of atrial fibrillation        Coronary artery disease involving native coronary artery of native heart with unstable angina pectoris        Azotemia        Status post ablation operation for arrhythmia        Epigastric pain              With regards to his current cardiology medications patient will have a CBC and a comprehensive metabolic panel to follow-up on potential for azotemia from the diuretic therapy and electrolyte disturbances from same.  We went over the medication list at least 3 times and I have stressed the need for him to try and comply to the treatment as prescribed.      From what I can gather he is taking the amiodarone 200 mg 2 every day as a loading dose as prescribed.  What he is not doing is taking the Lasix at the 40 mg dose and breaking it in half and is not taking his glaucoma supplement as prescribed because he says they are not getting absorbed.  Magnesium level was normal potassium was normal back on April 26, 2023 which is the last time that he had lab work according to my records access.    Vital signs are stable.      Auscultation of the chest is unremarkable and he remains in normal sinus rhythm.      I have made him very well aware of the fact the potential toxicity from the amiodarone and the fact that I will not be prescribing this drug for him.  Will require follow-up of pulmonary function testing, chest x-ray, liver function tests and thyroid function tests while on this drug as discussed.    There has been no AICD firing.      Patient says that he has never received the device to go on his  bedside that monitors the AICD function and this is something that will be addressed by Cardiology when he gets established with Dr. Parish Saenz.      Reports on this CBC and comprehensive metabolic panel should be available by for this afternoon and I will follow-up on same and contact the patient accordingly.      Abdomen prescription has been issued for 60 tablets of the 0.5 mg trying to offer a bridge until the patient can transition his care to his new primary provider Dr. Miguel Saenz.      He says that the sertraline is helping with regards to the generalized anxiety disorder at the 100 mg dose.    No urine for toxicology was obtained today.  Prescription monitoring program shows no discrepancies.      No care gaps were addressed today.      Being that the patient is moving out of town I do not foresee him returning to the clinic for follow-up.

## 2023-05-22 ENCOUNTER — EXTERNAL HOME HEALTH (OUTPATIENT)
Dept: HOME HEALTH SERVICES | Facility: HOSPITAL | Age: 68
End: 2023-05-22
Payer: MEDICARE

## 2023-05-29 PROBLEM — I63.511 ACUTE ISCHEMIC RIGHT MCA STROKE: Status: RESOLVED | Noted: 2018-05-23 | Resolved: 2023-05-29

## 2023-08-07 ENCOUNTER — TELEPHONE (OUTPATIENT)
Dept: CARDIOLOGY | Facility: CLINIC | Age: 68
End: 2023-08-07
Payer: MEDICARE

## 2023-08-07 DIAGNOSIS — I47.20 VENTRICULAR TACHYCARDIA: Primary | ICD-10-CM

## 2023-08-07 NOTE — TELEPHONE ENCOUNTER
----- Message from Helder Grace MD sent at 8/7/2023  7:35 AM CDT -----  FYI he will need a device check. Thanks.

## 2024-05-24 ENCOUNTER — OFFICE VISIT (OUTPATIENT)
Dept: URGENT CARE | Facility: CLINIC | Age: 69
End: 2024-05-24
Payer: MEDICARE

## 2024-05-24 VITALS
DIASTOLIC BLOOD PRESSURE: 91 MMHG | HEART RATE: 150 BPM | TEMPERATURE: 99 F | WEIGHT: 203 LBS | OXYGEN SATURATION: 94 % | SYSTOLIC BLOOD PRESSURE: 151 MMHG | HEIGHT: 73 IN | BODY MASS INDEX: 26.9 KG/M2 | RESPIRATION RATE: 26 BRPM

## 2024-05-24 DIAGNOSIS — R00.2 PALPITATIONS: Primary | ICD-10-CM

## 2024-05-24 PROCEDURE — 93000 ELECTROCARDIOGRAM COMPLETE: CPT | Mod: S$GLB,,, | Performed by: NURSE PRACTITIONER

## 2024-05-24 PROCEDURE — 99205 OFFICE O/P NEW HI 60 MIN: CPT | Mod: S$GLB,,, | Performed by: NURSE PRACTITIONER

## 2024-05-24 NOTE — PROGRESS NOTES
CHIEF COMPLAINT  No chief complaint on file.      HPI  Brandon Soto a 68 y.o. male who presents with complaints of heart palpitations.  Patient reports that his vitamin B12 and iron are  depleted because of the medicine they put me on.  Patient palpitations yesterday.  Patient states I just need all of my levels replenished to that I can start feeling better. Pt reports history of anxiety, HTN, MI, and v-tach. Denies abdominal pain, n/v/d, cough,  chest pain or sob.       CURRENT MEDICATIONS  Current Outpatient Medications on File Prior to Visit   Medication Sig Dispense Refill    amiodarone (PACERONE) 200 MG Tab Take 2 tablets (400 mg total) by mouth once daily. 60 tablet 11    aspirin 81 MG Chew Take 1 tablet (81 mg total) by mouth once daily.  0    furosemide (LASIX) 40 MG tablet Take 1 tablet (40 mg total) by mouth once daily. 90 tablet 3    lisinopriL (PRINIVIL,ZESTRIL) 5 MG tablet Take 1 tablet (5 mg total) by mouth once daily. 90 tablet 3    LORazepam (ATIVAN) 0.5 MG tablet Take 1 tablet (0.5 mg total) by mouth every 12 (twelve) hours as needed for Anxiety. 60 tablet 0    magnesium oxide 200 mg magnesium Tab Take 200 mg by mouth once daily. 30 tablet 11    metoprolol succinate (TOPROL-XL) 25 MG 24 hr tablet Take 0.5 tablets (12.5 mg total) by mouth once daily. 15 tablet 11    nitroGLYCERIN (NITROSTAT) 0.4 MG SL tablet Place 1 tablet (0.4 mg total) under the tongue every 5 (five) minutes as needed for Chest pain. 25 tablet 6    ondansetron (ZOFRAN-ODT) 4 MG TbDL Take 4 mg by mouth once.      pantoprazole (PROTONIX) 40 MG tablet Take 1 tablet (40 mg total) by mouth once daily. 90 tablet 3    sertraline (ZOLOFT) 100 MG tablet Take one by mouth every night 90 tablet 3    spironolactone (ALDACTONE) 25 MG tablet Take 1 tablet (25 mg total) by mouth once daily. 90 tablet 3     No current facility-administered medications on file prior to visit.       ALLERGIES  Review of patient's allergies indicates:    Allergen Reactions    Bactrim [sulfamethoxazole-trimethoprim] Shortness Of Breath    Statins-hmg-coa reductase inhibitors Other (See Comments)     Statin intolerance. Muscle weakness    Wellbutrin [bupropion hcl] Other (See Comments)     Abdominal pain          There is no immunization history on file for this patient.    PAST MEDICAL HISTORY  Past Medical History:   Diagnosis Date    Coronary artery disease     Hypertension     MI (myocardial infarction)     Thyroid disease        SURGICAL HISTORY  Past Surgical History:   Procedure Laterality Date    ABLATION, SCAR VT N/A 4/24/2023    Procedure: Ablation, Scar VT;  Surgeon: Helder Grace MD;  Location: [facility] EP LAB;  Service: Cardiology;  Laterality: N/A;  VT, VT RFA, CARTO, ANES, GP, 3080, PENDING ECHO    CARDIAC PACEMAKER PLACEMENT         SOCIAL HISTORY  Social History     Socioeconomic History    Marital status:    Tobacco Use    Smoking status: Every Day     Current packs/day: 1.00     Average packs/day: 1 pack/day for 40.0 years (40.0 ttl pk-yrs)     Types: Cigarettes    Smokeless tobacco: Never   Substance and Sexual Activity    Alcohol use: Yes     Comment: 12 pack beer daily    Drug use: No     Social Determinants of Health     Financial Resource Strain: Medium Risk (2/22/2023)    Overall Financial Resource Strain (CARDIA)     Difficulty of Paying Living Expenses: Somewhat hard   Food Insecurity: No Food Insecurity (2/22/2023)    Hunger Vital Sign     Worried About Running Out of Food in the Last Year: Never true     Ran Out of Food in the Last Year: Never true   Transportation Needs: No Transportation Needs (2/22/2023)    PRAPARE - Transportation     Lack of Transportation (Medical): No     Lack of Transportation (Non-Medical): No   Physical Activity: Inactive (2/22/2023)    Exercise Vital Sign     Days of Exercise per Week: 0 days     Minutes of Exercise per Session: 0 min   Stress: Stress Concern Present (2/22/2023)    [employer] [place] of  "Occupational Health - Occupational Stress Questionnaire     Feeling of Stress : Rather much   Housing Stability: Low Risk  (2/22/2023)    Housing Stability Vital Sign     Unable to Pay for Housing in the Last Year: No     Number of Places Lived in the Last Year: 1     Unstable Housing in the Last Year: No       FAMILY HISTORY  Family History   Problem Relation Name Age of Onset    Fibromyalgia Mother      Heart disease Father      Hyperlipidemia Father         REVIEW OF SYSTEMS  Constitutional: No fever, chills, or weakness.  Respiratory: No cough, wheezing or shortness of breath  Cardiovascular: No chest pain or edema + palpitations  Musculoskeletal: No pain, full range of motion. Good sensation  Skin: No rash or abrasion  Neurologic: No focal weakness or sensory changes.  All systems otherwise negative except as noted in the Review of Systems and History of Present Illness      PHYSICAL EXAM  Reviewed Triage Note  VITAL SIGNS: 151/91  Constitutional: Well developed, well nourished, Alert and oriented x3,   Neck: Normal range of motion, no tenderness, supple, no carotid bruits  Respiratory: Normal breath sounds, no respiratory distress, no wheezing, no rhonchi, no rales  Cardiovascular: /107, abnormal rhythm  Integument: Warm, Dry, No erythema, no rash  Neurologic: Normal motor function, normal sensory function. No focal deficits noted. Intact distal pulses  Psychiatric: Anxious, rapid speech        MEDICAL DECISION MAKING  Physical exam and EKG discussed with patient and the need for further evaluation and treatment in the ER. Pt became irrate, states "my EKG is always wrong, they always want to put me through the dog and pony show. I just need my Iron and b-12 replaced. Stressed the need for EMS transport and referral to the ER numerous times, pt repeadetly refused. States "I will drive myself." Pt signed refussal form and ambulated out of the clinic without difficulty.     DISPOSITION  Patient discharged " in stable condition       CLINICAL IMPRESSION:  The encounter diagnosis was Palpitations.    Patient advised to follow-up with your PCP within 3 days for BP re-check if Blood Pressure was >120/80 without history of hypertension.

## (undated) DEVICE — SHEATH HEMOSTASIS 8.5FR

## (undated) DEVICE — INTRODUCER AGILIS LG CURL

## (undated) DEVICE — ELECTRODE REM PLYHSV RETURN 9

## (undated) DEVICE — SET SMARTABLATE IRR TUBE

## (undated) DEVICE — PACK EP DRAPE OMC

## (undated) DEVICE — LINE PRESSURE MONITORING 96IN

## (undated) DEVICE — R CATH RESPONS QPLR JSN 6F 120

## (undated) DEVICE — R CATH ACUSON ACUNAV 8FR

## (undated) DEVICE — CATH THERMOCOOL SMTCH SF F J

## (undated) DEVICE — COVER DRAPE ACUSON STERILE

## (undated) DEVICE — R CATH BIDIRECTIONL DF CRV 7FR

## (undated) DEVICE — PAD DEFIB CADENCE ADULT R2

## (undated) DEVICE — NDL TRNSSPTL BRK-1 18GA 98CM

## (undated) DEVICE — INTRODUCER HEMOSTASIS 7.5F

## (undated) DEVICE — SHEATH INTRODUCER 9FR 11CM

## (undated) DEVICE — KIT PROBE COVER WITH GEL

## (undated) DEVICE — PATCH CARTO REFERENCE